# Patient Record
Sex: FEMALE | Race: WHITE | Employment: FULL TIME | URBAN - METROPOLITAN AREA
[De-identification: names, ages, dates, MRNs, and addresses within clinical notes are randomized per-mention and may not be internally consistent; named-entity substitution may affect disease eponyms.]

---

## 2020-12-30 ENCOUNTER — OFFICE VISIT (OUTPATIENT)
Dept: OBGYN CLINIC | Age: 30
End: 2020-12-30
Payer: COMMERCIAL

## 2020-12-30 VITALS
HEART RATE: 106 BPM | WEIGHT: 162 LBS | HEIGHT: 62 IN | BODY MASS INDEX: 29.81 KG/M2 | TEMPERATURE: 98.6 F | SYSTOLIC BLOOD PRESSURE: 114 MMHG | DIASTOLIC BLOOD PRESSURE: 72 MMHG

## 2020-12-30 PROBLEM — N80.9 ENDOMETRIOSIS: Status: ACTIVE | Noted: 2020-12-30

## 2020-12-30 PROBLEM — F32.A DEPRESSION: Status: ACTIVE | Noted: 2020-12-30

## 2020-12-30 PROBLEM — E03.8 OTHER SPECIFIED HYPOTHYROIDISM: Status: ACTIVE | Noted: 2020-12-30

## 2020-12-30 PROCEDURE — 99385 PREV VISIT NEW AGE 18-39: CPT | Performed by: NURSE PRACTITIONER

## 2020-12-30 RX ORDER — VITAMIN A ACETATE, .BETA.-CAROTENE, ASCORBIC ACID, CHOLECALCIFEROL, .ALPHA.-TOCOPHEROL ACETATE, DL-, THIAMINE MONONITRATE, RIBOFLAVIN, NIACINAMIDE, PYRIDOXINE HYDROCHLORIDE, FOLIC ACID, CYANOCOBALAMIN, CALCIUM CARBONATE, FERROUS FUMARATE, ZINC OXIDE, AND CUPRIC OXIDE 2000; 2000; 120; 400; 22; 1.84; 3; 20; 10; 1; 12; 200; 27; 25; 2 [IU]/1; [IU]/1; MG/1; [IU]/1; MG/1; MG/1; MG/1; MG/1; MG/1; MG/1; UG/1; MG/1; MG/1; MG/1; MG/1
1 TABLET ORAL DAILY
Qty: 30 TABLET | Refills: 5 | Status: SHIPPED | OUTPATIENT
Start: 2020-12-30 | End: 2021-07-13

## 2020-12-30 NOTE — PROGRESS NOTES
History and Physical  [address], [address] [facility] [address], [address] [facility] [address] [phone]   Fax [phone]  [address] [place] [address]  2020              35 y.o. Chief Complaint   Patient presents with   Adrian Martin Doctor    Annual Exam       Patient's last menstrual period was 2020. Primary Care Physician: Constantin Smiley    The patient was seen and examined. She  is here for her annual exam. States she has a hx of endometriosis. Hx laparoscopy and D&C in . Was on an [employer] clinical trial from 2017 to 10/2017 with relief. States this was the only time in the last several years without pelvic pain. TTC with  for past 8 years. Hx  declining to do a SA. States pelvic pain is worse over past 4 months. Minimal relief with tylenol and tylenol 3. States last u/s showed a 6 cm endometrioma on left ovary. Her bowels are regular. There are no voiding complaints. She denies any bloating. She denies vaginal discharge and was counseled on STD's and the need for barrier contraception.      HPI : [address] [place] [address] is a 35 y.o. female     Annual exam  Hx endometriosis   Pelvic pain   ________________________________________________________________________  OB History    Para Term  AB Living   0 0 0 0 0 0   SAB TAB Ectopic Molar Multiple Live Births   0 0 0 0 0 0     Past Medical History:   Diagnosis Date    Anxiety     ON RX    Hyperlipidemia     BOARDERLLINE NO MEDS    Kidney stones -2016    X 6-PASSED ALL ON OWN    URI (upper respiratory infection) 10/2016    WAS ON ATB, INHALER ORAL COUGH MED, RESOLVED NOW                                                                   Past Surgical History:   Procedure Laterality Date    ABDOMINAL EXPLORATION SURGERY  11/15/2016    D&C, chromotubation, fulgration    OVARY SURGERY Right 11/15/2016    biposy of cyst     Family History   Problem Relation Age of Onset  High Blood Pressure Mother     Diabetes Mother     Asthma Mother     Kidney Disease Father         KIDNEY STONES    Heart Failure Maternal Grandmother     Breast Cancer Paternal Grandmother     Other Paternal Grandmother         endometriosis    Asthma Brother      Social History     Socioeconomic History    Marital status:      Spouse name: Not on file    Number of children: Not on file    Years of education: Not on file    Highest education level: Not on file   Occupational History    Not on file   Social Needs    Financial resource strain: Not on file    Food insecurity     Worry: Not on file     Inability: Not on file    Transportation needs     Medical: Not on file     Non-medical: Not on file   Tobacco Use    Smoking status: Current Every Day Smoker     Packs/day: 0.50     Types: Cigarettes    Smokeless tobacco: Never Used   Substance and Sexual Activity    Alcohol use: Yes     Comment: LIQUOR-4 DRINKS A YEAR    Drug use: No    Sexual activity: Yes     Partners: Male   Lifestyle    Physical activity     Days per week: Not on file     Minutes per session: Not on file    Stress: Not on file   Relationships    Social connections     Talks on phone: Not on file     Gets together: Not on file     Attends [employer]ism service: Not on file     Active member of club or organization: Not on file     Attends meetings of clubs or organizations: Not on file     Relationship status: Not on file    Intimate partner violence     Fear of current or ex partner: Not on file     Emotionally abused: Not on file     Physically abused: Not on file     Forced sexual activity: Not on file   Other Topics Concern    Not on file   Social History Narrative    Not on file       MEDICATIONS:  Current Outpatient Medications   Medication Sig Dispense Refill    vitamin D (CHOLECALCIFEROL) 125 MCG (5000 UT) CAPS capsule Take 5,000 Units by mouth daily  progesterone (PROMETRIUM) 100 MG capsule Take 100 mg by mouth daily      Loratadine (CLARITIN PO) Take by mouth      clonazePAM (KLONOPIN) 0.5 MG tablet TK 1/2 TO 1 T PO BID  1    Erythromycin 2 % PADS AURORA AA BID                 USES ON FACE  1    MAGNESIUM PO Take 250 mg by mouth daily        No current facility-administered medications for this visit. ALLERGIES:  Allergies as of 12/30/2020 - Review Complete 12/30/2020   Allergen Reaction Noted    Ibuprofen Other (See Comments) 11/08/2016       Symptoms of decreased mood absent  Symptoms of anhedonia absent    **If either question is answered in a  positive fashion then complete the PHQ9 Scoring Evaluation and make the appropriate referral**      Immunization status: stated as current, but no records available. Gynecologic History:  Menarche: 16 yo  Menopause at [address] [place] yo     Patient's last menstrual period was 12/19/2020. Sexually Active: Yes    STD History: No     Permanent Sterilization: No   Reversible Birth Control: No        Hormone Replacement Exposure: No      Genetic Qualified Family History of Breast, Ovarian , Colon or Uterine Cancer: Yes paternal grandmother breast cancer age late 46s     If YES see scanned worksheet.     Preventative Health Testing:    Health Maintenance:  Health Maintenance Due   Topic Date Due    Hepatitis C screen  1991    Varicella vaccine (1 of 2 - 2-dose childhood series) 03/12/1992    Pneumococcal 0-64 years Vaccine (1 of 1 - PPSV23) 03/12/1997    HIV screen  03/12/2006    Hepatitis B vaccine (2 of 3 - 3-dose primary series) 04/20/2009    Hepatitis A vaccine (2 of 2 - 2-dose series) 09/23/2009    DTaP/Tdap/Td vaccine (2 - Td) 03/23/2019    Cervical cancer screen  10/19/2019    Flu vaccine (1) 09/01/2020       Date of Last Pap Smear: 10/19/2016 neg  Abnormal Pap Smear History: denies  Colposcopy History:   Date of Last Mammogram: NA  Date of Last Colonoscopy:   Date of Last Bone Density: ________________________________________________________________________        REVIEW OF SYSTEMS:    yes   A minimum of an eleven point review of systems was completed. Review Of Systems (11 point):  Constitutional: No fever, chills or malaise; No weight change or fatigue  Head and Eyes: No vision, Headache, Dizziness or trauma in last 12 months  ENT ROS: No hearing, Tinnitis, sinus or taste problems  Hematological and Lymphatic ROS:No Lymphoma, Von Willebrand's, Hemophillia or Bleeding History  Psych ROS: No Depression, Homicidal thoughts,suicidal thoughts, or anxiety  Breast ROS: No prior breast abnormalities or lumps  Respiratory ROS: No SOB, Pneumoniae,Cough, or Pulmonary Embolism History  Cardiovascular ROS: No Chest Pain with Exertion, Palpitations, Syncope, Edema, Arrhythmia  Gastrointestinal ROS: No Indigestion, Heartburn, Nausea, vomiting, Diarrhea, Constipation,or Bowel Changes; No Bloody Stools or melena  Genito-Urinary ROS: No Dysuria, Hematuria or Nocturia. No Urinary Incontinence or Vaginal Discharge  Musculoskeletal ROS: No Arthralgia, Arthritis,Gout,Osteoporosis or Rheumatism  Neurological ROS: No CVA, Migraines, Epilepsy, Seizure Hx, or Limb Weakness  Dermatological ROS: No Rash, Itching, Hives, Mole Changes or Cancer                                                                                                                                                                                                                                  PHYSICAL Exam:     Constitutional:  Vitals:    12/30/20 0833   BP: 114/72   Site: Left Upper Arm   Position: Sitting   Cuff Size: Medium Adult   Pulse: 106   Temp: 98.6 °F (37 °C)   Weight: 162 lb (73.5 kg)   Height: 5' 2\" (1.575 m)       Chaperone for Intimate Exam  ? Chaperone was offered and accepted as part of the rooming process. ? Chaperone: Kaye POTTS          General Appearance: This  is a well Developed, well Nourished, well groomed female. Her BMI was reviewed. Nutritional decision making was discussed. Skin:  There was a Normal Inspection of the skin without rashes or lesions. There were no rashes. (Papular, Maculopapular, Hives, Pustular, Macular)     There were no lesions (Ulcers, Erythema, Abn. Appearing Nevi)            Lymphatic:  No Lymph Nodes were Palpable in the neck , axilla or groin.  0 # Of Lymph Nodes; Location ; Character [Normal]  [Shotty] [Tender] [Enlarged]     Neck and EENT:  The neck was supple. There were no masses   The thyroid was not enlarged and had no masses. Perrla, EOMI B/L, TMI B/L No Abnormalities. Throat inspected-No exudates or Masses, Nares Patent No Masses        Respiratory: The lungs were auscultated and found to be clear. There were no rales, rhonchi or wheezes. There was a good respiratory effort. Cardiovascular: The heart was in a regular rate and rhythm. . No S3 or S4. There was no murmur appreciated. Location, grade, and radiation are not applicable. Extremities: The patients extremities were without calf tenderness, edema, or varicosities. There was full range of motion in all four extremities. Pulses in all four extremities were appreciated and are 2/4. Abdomen: The abdomen was soft and non-tender. There were good bowel sounds in all quadrants and there was no guarding, rebound or rigidity. On evaluation there was no evidence of hepatosplenomegaly and there was no costal vertebral mary tenderness bilaterally. No hernias were appreciated. Abdominal Scars: none    Psych: The patient had a normal Orientation to: Time, Place, Person, and Situation  There is no Mood / Affect changes    Breast:  (Chest)  normal appearance, no masses or tenderness  Self breast exams were reviewed in detail. Literature was given. Pelvic Exam:  Vulva and vagina appear normal. Bimanual exam reveals normal uterus and adnexa.     Rectal Exam:  exam declined by patient          Musculosk: Normal Gait and station was noted. Digits were evaluated without abnormal findings. Range of motion, stability and strength were evaluated and found to be appropriate for the patients age. ASSESSMENT:      35 y.o. Annual   Diagnosis Orders   1. Women's annual routine gynecological examination  PAP SMEAR   2. Pelvic pain  US PELVIS COMPLETE    US NON OB TRANSVAGINAL          Chief Complaint   Patient presents with    Established New Doctor    Annual Exam          Past Medical History:   Diagnosis Date    Anxiety 2012    ON RX    Hyperlipidemia     BOARDERLLINE NO MEDS    Kidney stones 2011-2016    X 6-PASSED ALL ON OWN    URI (upper respiratory infection) 10/2016    WAS ON ATB, INHALER ORAL COUGH MED, RESOLVED NOW         Patient Active Problem List    Diagnosis Date Noted    Acne vulgaris 09/28/2016    Menorrhagia with regular cycle 09/28/2016    Non morbid obesity due to excess calories 09/28/2016    Anxiety           Hereditary Breast, Ovarian, Colon and Uterine Cancer screening Done. Tobacco & Secondary smoke risks reviewed; instructed on cessation and avoidance      Counseling Completed:  Preventative Health Recommendations and Follow up. The patient was informed of the recommended preventative health recommendations. 1. Annuals every year; Cytology collections per prevailing guidelines. 2. Mammograms begin every year at 35 yo if no abnormalities are found and no family history. 3. Bone density studies every 2-3 years. Begin at 73 yo. If no fracture history or osteoporosis family history. (significant). 4. Colonoscopy begin at 40 yo. Repeat every ten years if negative and no family history. 5. Calcium of 2850-9531 mg/day in split dosing  6. Vitamin D 400-800 IU/day  7. All other preventative health recommendations will be managed by the patients Primary care physician. PLAN:  Return in about 4 weeks (around 1/27/2021) for follow up with physician. Pap smear collected  Referred to IVF [facility]  Pelvic u/s ordered   Records release signed   Rx PNV  Repeat Annual every 1 year  Cervical Cytology Evaluation begins at 24years old. If Negative Cytology, Follow-up screening per current guidelines. Mammograms every 1 year. If 35 yo and last mammogram was negative. Calcium and Vitamin D dosing reviewed. Colonoscopy screening reviewed as well as onset for bone density testing. Birth control and barrier recommendations discussed. STD counseling and prevention reviewed. Gardisil counseling completed for all patients 10-35 yo. Routine health maintenance per patients PCP. Orders Placed This Encounter   Procedures    US PELVIS COMPLETE     Standing Status:   Future     Standing Expiration Date:   3/30/2021     Order Specific Question:   Reason for exam:     Answer:   pelvic pain    US NON OB TRANSVAGINAL     Standing Status:   Future     Standing Expiration Date:   6/30/2021     Order Specific Question:   Reason for exam:     Answer:   pelvic pain    PAP SMEAR     Patient History:    Patient's last menstrual period was 12/19/2020. OBGYN Status: Having periods  Past Surgical History:  11/15/2016: ABDOMINAL EXPLORATION SURGERY      Comment:  D&C, chromotubation, fulgration  11/15/2016: OVARY SURGERY; Right      Comment:  biposy of cyst      Social History    Tobacco Use      Smoking status: Current Every Day Smoker        Packs/day: 0.50        Types: Cigarettes      Smokeless tobacco: Never Used       Standing Status:   Future     Standing Expiration Date:   12/30/2021     Order Specific Question:   Collection Type     Answer: Thin Prep     Order Specific Question:   Prior Abnormal Pap Test     Answer:   No     Order Specific Question:   Screening or Diagnostic     Answer:   Screening     Order Specific Question:   HPV Requested?      Answer:   Yes - If Abnormal Reflex HPV     Order Specific Question:   High Risk Patient     Answer:   N/A

## 2021-01-05 DIAGNOSIS — Z01.419 WOMEN'S ANNUAL ROUTINE GYNECOLOGICAL EXAMINATION: ICD-10-CM

## 2021-01-06 ENCOUNTER — TELEPHONE (OUTPATIENT)
Dept: OBGYN CLINIC | Age: 31
End: 2021-01-06

## 2021-01-27 ENCOUNTER — TELEPHONE (OUTPATIENT)
Dept: OBGYN CLINIC | Age: 31
End: 2021-01-27

## 2021-01-27 ENCOUNTER — HOSPITAL ENCOUNTER (OUTPATIENT)
Dept: ULTRASOUND IMAGING | Age: 31
Discharge: HOME | End: 2021-01-29
Payer: COMMERCIAL

## 2021-01-27 DIAGNOSIS — R10.2 PELVIC PAIN: ICD-10-CM

## 2021-01-27 PROCEDURE — 76856 US EXAM PELVIC COMPLETE: CPT

## 2021-01-27 NOTE — TELEPHONE ENCOUNTER
----- Message from BRUCE Orozco NP sent at 1/27/2021  8:39 AM EST -----  Please schedule follow up with physician  Patient is TTC  U/s showed fibroid  C/o pelvic pain

## 2021-01-28 NOTE — TELEPHONE ENCOUNTER
Patient notified of results and recommendations, encouraged patient to keep follow up with  to discuss ultrasound results, and discuss trying to conceive.

## 2021-02-05 ENCOUNTER — VIRTUAL VISIT (OUTPATIENT)
Dept: OBGYN CLINIC | Age: 31
End: 2021-02-05
Payer: COMMERCIAL

## 2021-02-05 DIAGNOSIS — E03.8 OTHER SPECIFIED HYPOTHYROIDISM: ICD-10-CM

## 2021-02-05 DIAGNOSIS — F32.A DEPRESSION, UNSPECIFIED DEPRESSION TYPE: ICD-10-CM

## 2021-02-05 DIAGNOSIS — R10.2 PELVIC PAIN: Primary | ICD-10-CM

## 2021-02-05 DIAGNOSIS — D25.2 SUBSEROUS LEIOMYOMA OF UTERUS: ICD-10-CM

## 2021-02-05 DIAGNOSIS — N80.9 ENDOMETRIOSIS: ICD-10-CM

## 2021-02-05 PROCEDURE — 99213 OFFICE O/P EST LOW 20 MIN: CPT | Performed by: OBSTETRICS & GYNECOLOGY

## 2021-02-05 RX ORDER — LEVOTHYROXINE, LIOTHYRONINE 38; 9 UG/1; UG/1
TABLET ORAL
COMMUNITY
Start: 2021-02-01 | End: 2021-07-13

## 2021-02-05 SDOH — HEALTH STABILITY: MENTAL HEALTH: HOW OFTEN DO YOU HAVE A DRINK CONTAINING ALCOHOL?: NOT ASKED

## 2021-02-05 NOTE — PROGRESS NOTES
The patient was counseled on the option of a virtual visit due to the Covid-19 pandemic per the guidelines set by [facility]. The patient accepted the option of the virtual visit. The patient wished to proceed with a virtual visit and declined a face to face visit.

## 2021-02-05 NOTE — PROGRESS NOTES
Rahel Pittman  2021    Rahel Pittman (:  1990) has requested an audio/video evaluation for the following concern(s):    1. Pelvic pain    2. Endometriosis    3. Hypothyroidism    4. Depression, unspecified depression type    5. Subserous leiomyoma of uterus          TELEHEALTH EVALUATION -- Audio/Visual (During XIO-29 public health emergency)      Denise Green is a 35 y.o. female       She was here to follow-up regarding her labs and diagnostics ordered at her last visit for the diagnosis of:    ICD-10-CM    1. Pelvic pain  R10.2    2. Endometriosis  N80.9    3. Hypothyroidism  E03.8    4. Depression, unspecified depression type  F32.9    5. Subserous leiomyoma of uterus  D25.2        Her bowels are regular and she is voiding without difficulty.        Past Medical History:   Diagnosis Date    Anxiety     ON RX    Hyperlipidemia     BOARDERLLINE NO MEDS    Kidney stones -2016    X 6-PASSED ALL ON OWN    URI (upper respiratory infection) 10/2016    WAS ON ATB, INHALER ORAL COUGH MED, RESOLVED NOW         Past Surgical History:   Procedure Laterality Date    ABDOMINAL EXPLORATION SURGERY  11/15/2016    D&C, chromotubation, fulgration    OVARY SURGERY Right 11/15/2016    biposy of cyst         Family History   Problem Relation Age of Onset    High Blood Pressure Mother     Diabetes Mother     Asthma Mother     Kidney Disease Father         KIDNEY STONES    Heart Failure Maternal Grandmother     Breast Cancer Paternal Grandmother     Other Paternal Grandmother         endometriosis    Asthma Brother          Social History     Tobacco Use    Smoking status: Current Every Day Smoker     Packs/day: 0.50     Types: Cigarettes    Smokeless tobacco: Never Used   Substance Use Topics    Alcohol use: Not Currently     Comment: LIQUOR-4 DRINKS A YEAR    Drug use: No         MEDICATIONS:  Current Outpatient Medications   Medication Sig Dispense Refill    vitamin D (CHOLECALCIFEROL) 125 MCG (5000 UT) CAPS capsule Take 5,000 Units by mouth daily      progesterone (PROMETRIUM) 100 MG capsule Take 100 mg by mouth daily      Prenatal Vit-Fe Fumarate-FA (PNV PRENATAL PLUS MULTIVITAMIN) 27-1 MG TABS Take 1 tablet by mouth daily 30 tablet 5    Loratadine (CLARITIN PO) Take by mouth      clonazePAM (KLONOPIN) 0.5 MG tablet TK 1/2 TO 1 T PO BID  1    Erythromycin 2 % PADS AURORA AA BID                 USES ON FACE  1    MAGNESIUM PO Take 250 mg by mouth daily       NP THYROID 60 MG tablet TAKE 1 TABLET BY MOUTH EVERY DAY       No current facility-administered medications for this visit. ALLERGIES:  Allergies as of 02/05/2021 - Review Complete 02/05/2021   Allergen Reaction Noted    Ibuprofen Other (See Comments) 11/08/2016         not currently breastfeeding. PE is limited due to the virtual visit    Abdomen: Soft non-tender; good bowel sounds. No guarding, rebound or rigidity. No CVA tenderness bilaterally reported when questioned. (Viewed Virtually)    Extremities: No calf tenderness, DTR 2/4, and No edema bilaterally as inspected by video and palpation by the patient (Viewed Virtually)    Pelvic: (Virtual Visit-Not Completed)    Diagnostics:  Us Pelvis Complete    Result Date: 1/27/2021  EXAMINATION: PELVIC ULTRASOUND 1/27/2021 TECHNIQUE: Transabdominal pelvic ultrasound was performed. COMPARISON: None HISTORY: ORDERING SYSTEM PROVIDED HISTORY: Pelvic pain TECHNOLOGIST PROVIDED HISTORY: This procedure can be scheduled via [employer]. Access your [employer] account by visiting [employer]. pelvic pain Acuity: Acute Type of Exam: Initial     FINDINGS: Measurements: Uterus:  8.2 x 3.5 by 5.1 cm Endometrial stripe:  6.3 mm Right Ovary:  2.9 x 2.3 x 3.3 cm Left Ovary:  3.2 x 2.4 x 3.1 cm Ultrasound Findings: Uterus: Uterus demonstrates normal myometrial echotexture.   Arising from the fundal region is a partially exophytic/possibly pedunculated heterogeneous hypoechoic mass measuring 2.4 x 2.3 x 3.2 cm most compatible with a fibroid. Endometrial stripe: Endometrial stripe is within normal limits. Right Ovary: Right ovary is within normal limits. Left Ovary:  Left ovary is within normal limits. Free Fluid: No evidence of free fluid. 3.2 cm partially exophytic fundal fibroid. Otherwise unremarkable pelvic ultrasound.          Lab Results:  Results for orders placed or performed during the hospital encounter of 19/48/99   Basic Metabolic Panel   Result Value Ref Range    Glucose 86 70 - 99 mg/dL    BUN 7 6 - 20 mg/dL    CREATININE 0.63 0.50 - 0.90 mg/dL    Bun/Cre Ratio 11 9 - 20    Calcium 8.9 8.6 - 10.4 mg/dL    Sodium 137 135 - 144 mmol/L    Potassium 4.0 3.7 - 5.3 mmol/L    Chloride 100 98 - 107 mmol/L    CO2 23 20 - 31 mmol/L    Anion Gap 14 9 - 17 mmol/L    GFR Non-African American >60 >60 mL/min    GFR African American >60 >60 mL/min    GFR Comment          GFR Staging NOT REPORTED    CBC Auto Differential   Result Value Ref Range    WBC 9.3 3.5 - 11.0 k/uL    RBC 4.73 4.0 - 5.2 m/uL    Hemoglobin 15.2 12.0 - 16.0 g/dL    Hematocrit 44.8 36 - 46 %    MCV 94.6 80 - 100 fL    MCH 32.2 26 - 34 pg    MCHC 34.0 31 - 37 g/dL    RDW 12.9 11.5 - 14.5 %    Platelets 914 095 - 814 k/uL    MPV 7.5 6.0 - 12.0 fL    Differential Type NOT REPORTED     Seg Neutrophils 60 36 - 66 %    Lymphocytes 31 24 - 44 %    Monocytes 4 1 - 7 %    Eosinophils % 5 (H) 1 - 4 %    Basophils 0 0 - 2 %    Segs Absolute 5.60 1.8 - 7.7 k/uL    Absolute Lymph # 2.80 1.0 - 4.8 k/uL    Absolute Mono # 0.40 0.2 - 0.8 k/uL    Absolute Eos # 0.50 (H) 0.0 - 0.4 k/uL    Basophils Absolute 0.00 0.0 - 0.2 k/uL    WBC Morphology NOT REPORTED     RBC Morphology NOT REPORTED     Platelet Estimate NOT REPORTED    Urinalysis   Result Value Ref Range    Color, UA YELLOW YEL    Turbidity UA CLEAR CLEAR    Glucose, Ur NEGATIVE NEG    Bilirubin Urine NEGATIVE NEG    Ketones, Urine NEGATIVE NEG    Specific [place], UA 1.010 1.005 - 1.030    Urine Hgb TRACE (A) NEG    pH, UA 6.5 5.0 - 8.0    Protein, UA NEGATIVE NEG    Urobilinogen, Urine Normal NORM    Nitrite, Urine NEGATIVE NEG    Leukocyte Esterase, Urine NEGATIVE NEG    Urinalysis Comments NOT REPORTED    Urine Drug Screen   Result Value Ref Range    Amphetamine Screen, Ur NEGATIVE NEG    Barbiturate Screen, Ur NEGATIVE NEG    Benzodiazepine Screen, Urine NEGATIVE NEG    Cocaine Metabolite, Urine NEGATIVE NEG    Methadone Screen, Urine NEGATIVE NEG    Opiates, Urine NEGATIVE NEG    Phencyclidine, Urine NEGATIVE NEG    Propoxyphene, Urine NOT REPORTED NEG    Cannabinoid Scrn, Ur NEGATIVE NEG    Oxycodone Screen, Ur NEGATIVE NEG    Methamphetamine, Urine NOT REPORTED NEG    Tricyclic Antidepressants, Urine NOT REPORTED NEG    MDMA, Urine NOT REPORTED NEG    Buprenorphine Urine NOT REPORTED NEG    Test Information       Assay provides medical screening only. The absence of expected drug(s) and/or   Microscopic Urinalysis   Result Value Ref Range    -          WBC, UA 0 TO 2 0 - 5 /HPF    RBC, UA 2 TO 5 0 - 2 /HPF    Casts UA NOT REPORTED /LPF    Crystals, UA NOT REPORTED NONE /HPF    Epithelial Cells UA 0 TO 2 /HPF    Renal Epithelial, UA NOT REPORTED 0 /HPF    Bacteria, UA NOT REPORTED NONE    Mucus, UA NOT REPORTED NONE    Trichomonas, UA NOT REPORTED NONE    Amorphous, UA NOT REPORTED NONE    Other Observations UA NOT REPORTED NREQ    Yeast, UA NOT REPORTED NONE   POCT urine pregnancy   Result Value Ref Range    Preg Test, Ur negative     QC OK? yes    POCT urine pregnancy   Result Value Ref Range    HCG, Pregnancy Urine (POC) NEGATIVE NEG           Assessment:   Diagnosis Orders   1. Pelvic pain     2. Endometriosis     3. Hypothyroidism     4. Depression, unspecified depression type     5.  Subserous leiomyoma of uterus       Chief Complaint   Patient presents with    Results         Patient Active Problem List    Diagnosis Date Noted    Endometriosis 12/30/2020     Priority: High    Menorrhagia with regular cycle 09/28/2016     Priority: High    Hypothyroidism 12/30/2020     Priority: Medium    Depression 12/30/2020     Priority: Medium    Subserous leiomyoma of uterus 02/05/2021    Acne vulgaris 09/28/2016    Non morbid obesity due to excess calories 09/28/2016    Anxiety        PLAN:  Return in about 2 weeks (around 2/19/2021) for Follow Up Current Problem-Virtual Visit. Smoking cessation reviewed  Male semen analysis-office to schedule  Consider L-scope with chromopertubation or HSG pending male studies. Nsaid therapy reviewed  Return to the office in 2-4 weeks. Counseled on preventative health maintenance follow-up. No orders of the defined types were placed in this encounter. Silva Cardenas is a 35 y.o. female female was evaluated by a Virtual Visit (video visit) encounter to address concerns as mentioned above. A caregiver was present when appropriate. Due to this being a TeleHealth encounter (During [facility]- [facility] emergency), evaluation of the following organ systems was limited: Vitals/Constitutional/EENT/Resp/CV/GI//MS/Neuro/Skin/Heme-Lymph-Imm. Pursuant to the emergency declaration under the [facility], [address] [place] [address] authority and the [employer] and [employer] Act, this Virtual Visit was conducted with patient's (and/or legal guardian's) consent, to reduce the patient's risk of exposure to COVID-19 and provide necessary medical care. The patient (and/or legal guardian) has also been advised to contact this office for worsening conditions or problems, and seek emergency medical treatment and/or call 911 if deemed necessary. Services were provided through a video synchronous discussion virtually to substitute for in-person clinic visit. Patient and provider were located at their individual homes.     Electronically signed by Kirill Hughes DO on 2/5/21 at 11:17 AM EST     An electronic signature was used to authenticate this note. The patient, Lorna Sanchez is a 35 y.o. female, was seen with a total time spent of 20 minutes for the visit on this date of service by the E/M provider. The time component had both face to face and non face to face time spent in determining the total time component. Counseling and education regarding her diagnosis listed below and her options regarding those diagnoses were also included in determining her time component. Diagnosis Orders   1. Pelvic pain     2. Endometriosis     3. Hypothyroidism     4. Depression, unspecified depression type     5. Subserous leiomyoma of uterus          The patient had her preventative health maintenance recommendations and follow-up reviewed with her at the completion of her visit.

## 2021-07-13 ENCOUNTER — OFFICE VISIT (OUTPATIENT)
Dept: OBGYN CLINIC | Age: 31
End: 2021-07-13

## 2021-07-13 VITALS
BODY MASS INDEX: 29.44 KG/M2 | WEIGHT: 160 LBS | HEIGHT: 62 IN | DIASTOLIC BLOOD PRESSURE: 84 MMHG | SYSTOLIC BLOOD PRESSURE: 125 MMHG | HEART RATE: 91 BPM

## 2021-07-13 DIAGNOSIS — F32.89 OTHER DEPRESSION: ICD-10-CM

## 2021-07-13 DIAGNOSIS — R87.610 ATYPICAL SQUAMOUS CELLS OF UNDETERMINED SIGNIFICANCE ON CYTOLOGIC SMEAR OF CERVIX (ASC-US): ICD-10-CM

## 2021-07-13 DIAGNOSIS — N80.9 ENDOMETRIOSIS: ICD-10-CM

## 2021-07-13 PROCEDURE — 99214 OFFICE O/P EST MOD 30 MIN: CPT | Performed by: OBSTETRICS & GYNECOLOGY

## 2021-07-13 RX ORDER — AMOXICILLIN AND CLAVULANATE POTASSIUM 500; 125 MG/1; MG/1
TABLET, FILM COATED ORAL
COMMUNITY
Start: 2021-07-06 | End: 2022-01-19

## 2021-07-13 RX ORDER — LEVOTHYROXINE, LIOTHYRONINE 57; 13.5 UG/1; UG/1
TABLET ORAL
COMMUNITY
Start: 2021-06-25

## 2021-07-13 NOTE — PROGRESS NOTES
MEDICATIONS:  Current Outpatient Medications   Medication Sig Dispense Refill    NP THYROID 90 MG tablet TAKE 1 TABLET BY MOUTH EVERY DAY      amoxicillin-clavulanate (AUGMENTIN) 500-125 MG per tablet TAKE 1 TABLET BY MOUTH EVERY TWELVE HOURS FOR 7 DAYS      vitamin D (CHOLECALCIFEROL) 125 MCG (5000 UT) CAPS capsule Take 5,000 Units by mouth daily      progesterone (PROMETRIUM) 100 MG capsule Take 100 mg by mouth daily      clonazePAM (KLONOPIN) 0.5 MG tablet TK 1/2 TO 1 T PO BID  1    MAGNESIUM PO Take 250 mg by mouth daily        No current facility-administered medications for this visit. ALLERGIES:  Allergies as of 07/13/2021 - Fully Reviewed 07/13/2021   Allergen Reaction Noted    Ibuprofen Other (See Comments) 11/08/2016         Blood pressure 125/84, pulse 91, height 5' 2\" (1.575 m), weight 160 lb (72.6 kg), last menstrual period 07/10/2021, not currently breastfeeding. Abdomen: Soft non-tender; good bowel sounds. No guarding, rebound or rigidity. No CVA tenderness bilaterally.     Extremities: No calf tenderness, DTR 2/4, and No edema bilaterally    Pelvic: Declined         Diagnostics:  EXAMINATION:   PELVIC ULTRASOUND       1/27/2021       TECHNIQUE:   Transabdominal pelvic ultrasound was performed.       COMPARISON:   None       HISTORY:   ORDERING SYSTEM PROVIDED HISTORY: Pelvic pain   TECHNOLOGIST PROVIDED HISTORY:   This procedure can be scheduled via [employer].  Access your [employer] account by   visiting [employer].   pelvic pain   Acuity: Acute   Type of Exam: Initial       FINDINGS:       Measurements:       Uterus:  8.2 x 3.5 by 5.1 cm       Endometrial stripe:  6.3 mm       Right Ovary:  2.9 x 2.3 x 3.3 cm       Left Ovary:  3.2 x 2.4 x 3.1 cm           Ultrasound Findings:       Uterus: Uterus demonstrates normal myometrial echotexture.  Arising from the   fundal region is a partially exophytic/possibly pedunculated heterogeneous   hypoechoic mass measuring 2.4 x 2.3 x 3.2 cm most compatible with a fibroid.       Endometrial stripe: Endometrial stripe is within normal limits.       Right Ovary: Right ovary is within normal limits.       Left Ovary:  Left ovary is within normal limits.       Free Fluid: No evidence of free fluid.           Impression   3.2 cm partially exophytic fundal fibroid.       Otherwise unremarkable pelvic ultrasound.            Lab Results:  Results for orders placed or performed during the hospital encounter of 27/88/25   Basic Metabolic Panel   Result Value Ref Range    Glucose 86 70 - 99 mg/dL    BUN 7 6 - 20 mg/dL    CREATININE 0.63 0.50 - 0.90 mg/dL    Bun/Cre Ratio 11 9 - 20    Calcium 8.9 8.6 - 10.4 mg/dL    Sodium 137 135 - 144 mmol/L    Potassium 4.0 3.7 - 5.3 mmol/L    Chloride 100 98 - 107 mmol/L    CO2 23 20 - 31 mmol/L    Anion Gap 14 9 - 17 mmol/L    GFR Non-African American >60 >60 mL/min    GFR African American >60 >60 mL/min    GFR Comment          GFR Staging NOT REPORTED    CBC Auto Differential   Result Value Ref Range    WBC 9.3 3.5 - 11.0 k/uL    RBC 4.73 4.0 - 5.2 m/uL    Hemoglobin 15.2 12.0 - 16.0 g/dL    Hematocrit 44.8 36 - 46 %    MCV 94.6 80 - 100 fL    MCH 32.2 26 - 34 pg    MCHC 34.0 31 - 37 g/dL    RDW 12.9 11.5 - 14.5 %    Platelets 472 579 - 004 k/uL    MPV 7.5 6.0 - 12.0 fL    Differential Type NOT REPORTED     Seg Neutrophils 60 36 - 66 %    Lymphocytes 31 24 - 44 %    Monocytes 4 1 - 7 %    Eosinophils % 5 (H) 1 - 4 %    Basophils 0 0 - 2 %    Segs Absolute 5.60 1.8 - 7.7 k/uL    Absolute Lymph # 2.80 1.0 - 4.8 k/uL    Absolute Mono # 0.40 0.2 - 0.8 k/uL    Absolute Eos # 0.50 (H) 0.0 - 0.4 k/uL    Basophils Absolute 0.00 0.0 - 0.2 k/uL    WBC Morphology NOT REPORTED     RBC Morphology NOT REPORTED     Platelet Estimate NOT REPORTED    Urinalysis   Result Value Ref Range    Color, UA YELLOW YEL    Turbidity UA CLEAR CLEAR    Glucose, Ur NEGATIVE NEG    Bilirubin Urine NEGATIVE NEG    Ketones, Urine NEGATIVE NEG Specific Gravity, UA 1.010 1.005 - 1.030    Urine Hgb TRACE (A) NEG    pH, UA 6.5 5.0 - 8.0    Protein, UA NEGATIVE NEG    Urobilinogen, Urine Normal NORM    Nitrite, Urine NEGATIVE NEG    Leukocyte Esterase, Urine NEGATIVE NEG    Urinalysis Comments NOT REPORTED    Urine Drug Screen   Result Value Ref Range    Amphetamine Screen, Ur NEGATIVE NEG    Barbiturate Screen, Ur NEGATIVE NEG    Benzodiazepine Screen, Urine NEGATIVE NEG    Cocaine Metabolite, Urine NEGATIVE NEG    Methadone Screen, Urine NEGATIVE NEG    Opiates, Urine NEGATIVE NEG    Phencyclidine, Urine NEGATIVE NEG    Propoxyphene, Urine NOT REPORTED NEG    Cannabinoid Scrn, Ur NEGATIVE NEG    Oxycodone Screen, Ur NEGATIVE NEG    Methamphetamine, Urine NOT REPORTED NEG    Tricyclic Antidepressants, Urine NOT REPORTED NEG    MDMA, Urine NOT REPORTED NEG    Buprenorphine Urine NOT REPORTED NEG    Test Information       Assay provides medical screening only. The absence of expected drug(s) and/or   Microscopic Urinalysis   Result Value Ref Range    -          WBC, UA 0 TO 2 0 - 5 /HPF    RBC, UA 2 TO 5 0 - 2 /HPF    Casts UA NOT REPORTED /LPF    Crystals, UA NOT REPORTED NONE /HPF    Epithelial Cells UA 0 TO 2 /HPF    Renal Epithelial, UA NOT REPORTED 0 /HPF    Bacteria, UA NOT REPORTED NONE    Mucus, UA NOT REPORTED NONE    Trichomonas, UA NOT REPORTED NONE    Amorphous, UA NOT REPORTED NONE    Other Observations UA NOT REPORTED NREQ    Yeast, UA NOT REPORTED NONE   POCT urine pregnancy   Result Value Ref Range    Preg Test, Ur negative     QC OK? yes    POCT urine pregnancy   Result Value Ref Range    HCG, Pregnancy Urine (POC) NEGATIVE NEG           Assessment:   Diagnosis Orders   1. Endometriosis Stage 3 by photos     2. Atypical squamous cells of undetermined significance on cytologic smear of cervix (ASC-US) + HRHPV Other     3.  Other depression       Chief Complaint   Patient presents with    Other     discuss fertility          Patient Active Problem List    Diagnosis Date Noted    Atypical squamous cells of undetermined significance on cytologic smear of cervix (ASC-US) + HRHPV Other 07/13/2021     Priority: High     Needs Colposcopy      Subserous leiomyoma of uterus-Pedunculated posteriorly 02/05/2021     Priority: High    Endometriosis Stage 3 by photos 12/30/2020     Priority: High     Pt was in test pool for [place] with Success      Menorrhagia with regular cycle 09/28/2016     Priority: High    Hypothyroidism 12/30/2020     Priority: Medium    Depression 12/30/2020     Priority: Medium     On Clonopin      Acne vulgaris 09/28/2016    Anxiety        PLAN:  Return in about 2 weeks (around 7/27/2021) for Colposcopy. Colposcopy 1-2 weeks  Lupron 3.75 mg IM /month up to 6 months with planned Operative L-Scope with FOI/KASSANDRA and Chromopertubation. Plan attempting pregnancy 6-8 weeks after last Lupron with two cycles  Return to the office in 2 weeks. Abstain  RTO 3 months to schedule surgery  Counseled on preventative health maintenance follow-up. No orders of the defined types were placed in this encounter. The patient, Brent Melgoza is a 28 y.o. female, was seen with a total time spent of 30 minutes for the visit on this date of service by the E/M provider. The time component had both face to face and non face to face time spent in determining the total time component. Counseling and education regarding her diagnosis listed below and her options regarding those diagnoses were also included in determining her time component. Diagnosis Orders   1. Endometriosis Stage 3 by photos     2. Atypical squamous cells of undetermined significance on cytologic smear of cervix (ASC-US) + HRHPV Other     3. Other depression          The patient had her preventative health maintenance recommendations and follow-up reviewed with her at the completion of her visit.

## 2021-09-30 ENCOUNTER — TELEPHONE (OUTPATIENT)
Dept: OBGYN CLINIC | Age: 31
End: 2021-09-30

## 2021-09-30 NOTE — TELEPHONE ENCOUNTER
Spoke with patient regarding Lupron injections. Patient was approved but due to the expense of the injection patient is unable to move forward. Patient has a high deductible and even with co pay card injection would be 600.00 a month. Patient would like to know if there is anything else that could be done?

## 2021-12-07 ENCOUNTER — TELEPHONE (OUTPATIENT)
Dept: OBGYN CLINIC | Age: 31
End: 2021-12-07

## 2021-12-07 NOTE — TELEPHONE ENCOUNTER
LM for pt to call office regarding the Lupron denial and other recommendations give per Dr Briana Shin.

## 2021-12-14 NOTE — TELEPHONE ENCOUNTER
----- Message from BRUCE Kyle CNP sent at 1/5/2021  3:29 PM EST -----  Pap smear ASCUS  Please see if HPV can be run?   AGE [phone]

## 2022-01-19 ENCOUNTER — OFFICE VISIT (OUTPATIENT)
Dept: OBGYN CLINIC | Age: 32
End: 2022-01-19
Payer: COMMERCIAL

## 2022-01-19 VITALS
BODY MASS INDEX: 29.63 KG/M2 | SYSTOLIC BLOOD PRESSURE: 120 MMHG | WEIGHT: 161 LBS | HEIGHT: 62 IN | DIASTOLIC BLOOD PRESSURE: 82 MMHG

## 2022-01-19 DIAGNOSIS — Z01.419 ENCOUNTER FOR GYNECOLOGICAL EXAMINATION WITHOUT ABNORMAL FINDING: Primary | ICD-10-CM

## 2022-01-19 DIAGNOSIS — Z11.51 SPECIAL SCREENING EXAMINATION FOR HUMAN PAPILLOMAVIRUS (HPV): ICD-10-CM

## 2022-01-19 PROCEDURE — 99395 PREV VISIT EST AGE 18-39: CPT | Performed by: NURSE PRACTITIONER

## 2022-01-19 NOTE — PROGRESS NOTES
History and Physical  [address], [address] [facility] [address], [address]. [phone]   Fax [phone]  [address] [place] [address]  2022              28 y.o. Chief Complaint   Patient presents with    Gynecologic Exam       Patient's last menstrual period was 2021 (approximate). Primary Care Physician: BRUCE Muir NP    The patient was seen and examined. She has no chief complaint today and is here for her annual exam.  Her bowels are regular. There are no voiding complaints. She denies any bloating. She denies vaginal discharge and was counseled on STD's and the need for barrier contraception.      HPI : [address] [place] [address] Jose is a 28 y.o. female [place]    Annual exam  NO chief complaint    no Bloating  no Early Satiety  no Unexplained weight change of more than 15 lbs  no  PMB  no  PCB  ________________________________________________________________________  OB History    Para Term  AB Living   0 0 0 0 0 0   SAB IAB Ectopic Molar Multiple Live Births   0 0 0 0 0 0     Past Medical History:   Diagnosis Date    Anxiety     ON RX    Hyperlipidemia     BOARDERLLINE NO MEDS    Kidney stones -2016    X 6-PASSED ALL ON OWN    URI (upper respiratory infection) 10/2016    WAS ON ATB, INHALER ORAL COUGH MED, RESOLVED NOW                                                                   Past Surgical History:   Procedure Laterality Date    ABDOMINAL EXPLORATION SURGERY  11/15/2016    D&C, chromotubation, fulgration    OVARY SURGERY Right 11/15/2016    biposy of cyst     Family History   Problem Relation Age of Onset    High Blood Pressure Mother     Diabetes Mother     Asthma Mother     Kidney Disease Father         KIDNEY STONES    Heart Failure Maternal Grandmother     Breast Cancer Paternal Grandmother     Other Paternal Grandmother         endometriosis    Asthma Brother      Social History     Socioeconomic History    Marital status:      Spouse name: Not on file    Number of children: Not on file    Years of education: Not on file    Highest education level: Not on file   Occupational History    Not on file   Tobacco Use    Smoking status: Current Every Day Smoker     Packs/day: 0.50     Types: Cigarettes    Smokeless tobacco: Never Used   Substance and Sexual Activity    Alcohol use: Not Currently     Comment: RMRZFL-7 DRINKS A YEAR    Drug use: No    Sexual activity: Yes     Partners: Male   Other Topics Concern    Not on file   Social History Narrative    Not on file     Social Determinants of Health     Financial Resource Strain:     Difficulty of Paying Living Expenses: Not on file   Food Insecurity:     Worried About Running Out of Food in the Last Year: Not on file    Delfino of Food in the Last Year: Not on file   Transportation Needs:     Lack of Transportation (Medical): Not on file    Lack of Transportation (Non-Medical):  Not on file   Physical Activity:     Days of Exercise per Week: Not on file    Minutes of Exercise per Session: Not on file   Stress:     Feeling of Stress : Not on file   Social Connections:     Frequency of Communication with Friends and Family: Not on file    Frequency of Social Gatherings with Friends and Family: Not on file    Attends [employer] Services: Not on file    Active Member of [address] [place] [address] [employer] or Organizations: Not on file    Attends Club or Organization Meetings: Not on file    Marital Status: Not on file   Intimate Partner Violence:     Fear of Current or Ex-Partner: Not on file    Emotionally Abused: Not on file    Physically Abused: Not on file    Sexually Abused: Not on file   Housing Stability:     Unable to Pay for Housing in the Last Year: Not on file    Number of [place] in the Last Year: Not on file    Unstable Housing in the Last Year: Not on file       MEDICATIONS:  Current Outpatient Medications   Medication Sig Dispense Refill    NP THYROID 90 MG tablet TAKE 1 TABLET BY MOUTH EVERY DAY      vitamin D (CHOLECALCIFEROL) 125 MCG (5000 UT) CAPS capsule Take 5,000 Units by mouth daily      clonazePAM (KLONOPIN) 0.5 MG tablet TK 1/2 TO 1 T PO BID  1    MAGNESIUM PO Take 250 mg by mouth daily        No current facility-administered medications for this visit. ALLERGIES:  Allergies as of 01/19/2022 - Fully Reviewed 01/19/2022   Allergen Reaction Noted    Ibuprofen Other (See Comments) 11/08/2016       Symptoms of decreased mood absent  Symptoms of anhedonia absent    **If either question is answered in a  positive fashion then complete the PHQ9 Scoring Evaluation and make the appropriate referral**      Immunization status: stated as current, but no records available. Gynecologic History:  Menarche: 16 yo  Menopause at [age] yo     Patient's last menstrual period was 12/28/2021 (approximate). Sexually Active: Yes    STD History: No     Permanent Sterilization: No   Reversible Birth Control: No        Hormone Replacement Exposure: No      Genetic Qualified Family History of Breast, Ovarian , Colon or Uterine Cancer: No     If YES see scanned worksheet.     Preventative Health Testing:    Health Maintenance:  Health Maintenance Due   Topic Date Due    Hepatitis C screen  Never done    Varicella vaccine (1 of 2 - 2-dose childhood series) Never done    COVID-19 Vaccine (1) Never done    Pneumococcal 0-64 years Vaccine (1 of 2 - PPSV23) Never done    Depression Monitoring  Never done    HIV screen  Never done    Hepatitis B vaccine (2 of 3 - 3-dose primary series) 04/20/2009    Hepatitis A vaccine (2 of 2 - 2-dose series) 09/23/2009    TSH testing  09/28/2017    DTaP/Tdap/Td vaccine (2 - Td or Tdap) 03/23/2019    Flu vaccine (1) 09/01/2021       Date of Last Pap Smear: 12/1/2020 ASCUS  Abnormal Pap Smear History: ASCUS  Colposcopy History:   Date of Last Mammogram: NA  Date of Last Colonoscopy:   Date of Last Bone Density:      ________________________________________________________________________        REVIEW OF SYSTEMS:    yes   A minimum of an eleven point review of systems was completed. Review Of Systems (11 point):  Constitutional: No fever, chills or malaise; No weight change or fatigue  Head and Eyes: No vision changes, Headache, Dizziness or trauma in last 12 months  ENT ROS: No hearing, Tinnitis, sinus or taste problems  Hematological and Lymphatic ROS:No Lymphoma, Von Willebrand's, Hemophillia or Bleeding History  Psych ROS: No Depression, Homicidal thoughts,suicidal thoughts, or anxiety  Breast ROS: No breast abnormalities or lumps  Respiratory ROS: No SOB, Pneumoniae,Cough, or Pulmonary Embolism   Cardiovascular ROS: No Chest Pain with Exertion, Palpitations, Syncope, Edema, Arrhythmia  Gastrointestinal ROS: No Indigestion, Heartburn, Nausea, vomiting, Diarrhea, Constipation,or Bowel Changes; No Bloody Stools or melena  Genito-Urinary ROS: No Dysuria, Hematuria or Nocturia. No Urinary Incontinence or Vaginal Discharge  Musculoskeletal ROS: No Arthralgia, Arthritis,Gout,Osteoporosis or Rheumatism  Neurological ROS: No CVA, Migraines, Epilepsy, Seizure Hx, or Limb Weakness  Dermatological ROS: No Rash, Itching, Hives, Mole Changes or Cancer                                                                                                                                                                                                                                  PHYSICAL Exam:     Constitutional:  Vitals:    01/19/22 1546   BP: 120/82   Site: Left Upper Arm   Position: Sitting   Cuff Size: Medium Adult   Weight: 161 lb (73 kg)   Height: 5' 2\" (1.575 m)       Chaperone for Intimate Exam   Chaperone was offered and accepted as part of the rooming process.  Chaperone: Zabrina POTTS          General Appearance: This  is a well Developed, well Nourished, well groomed female. Her BMI was reviewed.  Nutritional decision making was discussed. Skin:  There was a Normal Inspection of the skin without rashes or lesions. There were no rashes. (Papular, Maculopapular, Hives, Pustular, Macular)     There were no lesions (Ulcers, Erythema, Abn. Appearing Nevi)            Lymphatic:  No Lymph Nodes were Palpable in the neck , axilla or groin.  0 # Of Lymph Nodes; Location ; Character [Normal]  [Shotty] [Tender] [Enlarged]     Neck and EENT:  The neck was supple. There were no masses   The thyroid was not enlarged and had no masses. Perrla, EOMI B/L, TMI B/L No Abnormalities. Throat inspected-No exudates or Masses, Nares Patent No Masses        Respiratory: The lungs were auscultated and found to be clear. There were no rales, rhonchi or wheezes. There was a good respiratory effort. Cardiovascular: The heart was in a regular rate and rhythm. . No S3 or S4. There was no murmur appreciated. Location, grade, and radiation are not applicable. Extremities: The patients extremities were without calf tenderness, edema, or varicosities. There was full range of motion in all four extremities. Pulses in all four extremities were appreciated and are 2/4. Abdomen: The abdomen was soft and non-tender. There were good bowel sounds in all quadrants and there was no guarding, rebound or rigidity. On evaluation there was no evidence of hepatosplenomegaly and there was no costal vertebral mary tenderness bilaterally. No hernias were appreciated. Abdominal Scars: none    Psych: The patient had a normal Orientation to: Time, Place, Person, and Situation  There is no Mood / Affect changes    Breast:  (Chest)  normal appearance, no masses or tenderness  Self breast exams were reviewed in detail. Literature was given.     Pelvic Exam:  External genitalia: normal general appearance  Urinary system: urethral meatus normal  Vaginal: normal mucosa without prolapse or lesions  Cervix: normal appearance  Adnexa: normal bimanual exam  Uterus: normal single, nontender      Rectal Exam:  exam declined by patient          Musculosk:  Normal Gait and station was noted. Digits were evaluated without abnormal findings. Range of motion, stability and strength were evaluated and found to be appropriate for the patients age. ASSESSMENT:      28 y.o. Annual   Diagnosis Orders   1. Encounter for gynecological examination without abnormal finding  PAP SMEAR   2. Special screening examination for human papillomavirus (HPV)  PAP SMEAR          Chief Complaint   Patient presents with    Gynecologic Exam          Past Medical History:   Diagnosis Date    Anxiety 2012    ON RX    Hyperlipidemia     BOARDERLLINE NO MEDS    Kidney stones 2011-2016    X 6-PASSED ALL ON OWN    URI (upper respiratory infection) 10/2016    WAS ON ATB, INHALER ORAL COUGH MED, RESOLVED NOW         Patient Active Problem List    Diagnosis Date Noted    Atypical squamous cells of undetermined significance on cytologic smear of cervix (ASC-US) + HRHPV Other 07/13/2021     Priority: High     Needs Colposcopy      Subserous leiomyoma of uterus-Pedunculated posteriorly 02/05/2021    Endometriosis Stage 3 by photos 12/30/2020     Pt was in test pool for Orilissa with Success      Hypothyroidism 12/30/2020    Depression 12/30/2020     On Clonopin      Acne vulgaris 09/28/2016    Menorrhagia with regular cycle 09/28/2016    Anxiety           Hereditary Breast, Ovarian, Colon and Uterine Cancer screening Done. Tobacco & Secondary smoke risks reviewed; instructed on cessation and avoidance      Counseling Completed:  Preventative Health Recommendations and Follow up. The patient was informed of the recommended preventative health recommendations. 1. Annuals every year; Cytology collections per prevailing guidelines. 2. Mammograms begin every year at 37 yo if no abnormalities are found and no family history. 3. Bone density studies every 2-3 years.  Begin at 73 yo. If no fracture history or osteoporosis family history. (significant). 4. Colonoscopy begin at 38 yo. Repeat every ten years if negative and no family history. 5. Calcium of 3151-3809 mg/day in split dosing  6. Vitamin D 400-800 IU/day  7. All other preventative health recommendations will be managed by the patients Primary care physician. PLAN:  Return in about 1 year (around 2023) for annual.   Pap smear collected  Repeat Annual every 1 year  Cervical Cytology Evaluation begins at 24years old. If Negative Cytology, Follow-up screening per current guidelines. Mammograms every 1 year. If 37 yo and last mammogram was negative. Calcium and Vitamin D dosing reviewed. Colonoscopy screening reviewed as well as onset for bone density testing. Birth control and barrier recommendations discussed. STD counseling and prevention reviewed. Gardisil counseling completed for all patients 10-37 yo. Routine health maintenance per patients PCP. Orders Placed This Encounter   Procedures    PAP SMEAR     Patient History:    No LMP recorded. OBGYN Status: Having periods  Past Surgical History:  11/15/2016: ABDOMINAL EXPLORATION SURGERY      Comment:  D&C, chromotubation, fulgration  11/15/2016: OVARY SURGERY; Right      Comment:  biposy of cyst      Social History    Tobacco Use      Smoking status: Current Every Day Smoker        Packs/day: 0.50        Types: Cigarettes      Smokeless tobacco: Never Used       Standing Status:   Future     Standing Expiration Date:   2023     Order Specific Question:   Collection Type     Answer: Thin Prep     Order Specific Question:   Prior Abnormal Pap Test     Answer:   No     Order Specific Question:   Screening or Diagnostic     Answer:   Screening     Order Specific Question:   HPV Requested?      Answer:   Yes     Order Specific Question:   High Risk Patient     Answer:   N/A           The patient, Elsa Apgar is a 28 y.o. female, was seen with a total time spent of 30 minutes for the visit on this date of service by the E/M provider. The time component had both face to face and non face to face time spent in determining the total time component. Counseling and education regarding her diagnosis listed below and her options regarding those diagnoses were also included in determining her time component. Diagnosis Orders   1. Encounter for gynecological examination without abnormal finding  PAP SMEAR   2. Special screening examination for human papillomavirus (HPV)  PAP SMEAR        The patient had her preventative health maintenance recommendations and follow-up reviewed with her at the completion of her visit.

## 2022-01-24 ENCOUNTER — TELEPHONE (OUTPATIENT)
Dept: OBGYN CLINIC | Age: 32
End: 2022-01-24

## 2022-01-24 DIAGNOSIS — Z11.51 SPECIAL SCREENING EXAMINATION FOR HUMAN PAPILLOMAVIRUS (HPV): ICD-10-CM

## 2022-01-24 DIAGNOSIS — Z01.419 ENCOUNTER FOR GYNECOLOGICAL EXAMINATION WITHOUT ABNORMAL FINDING: ICD-10-CM

## 2022-01-24 NOTE — TELEPHONE ENCOUNTER
Per Josh Jha NP, pt notified of pap results of ASCUS and HPV+. Encouraged pt to keep appt for colposcopy on 2/17/22. Pt verbalized understanding.

## 2022-01-24 NOTE — TELEPHONE ENCOUNTER
----- Message from BRUCE Thurston NP sent at 1/24/2022 12:09 PM EST -----  Pap smear ASCUS  HPV + (not differentiated with [facility])  Age 28 y.o.   Patient scheduled for colp 2/17/2022

## 2022-05-05 ENCOUNTER — PROCEDURE VISIT (OUTPATIENT)
Dept: OBGYN CLINIC | Age: 32
End: 2022-05-05
Payer: COMMERCIAL

## 2022-05-05 VITALS
BODY MASS INDEX: 30.55 KG/M2 | WEIGHT: 166 LBS | DIASTOLIC BLOOD PRESSURE: 70 MMHG | SYSTOLIC BLOOD PRESSURE: 112 MMHG | HEIGHT: 62 IN

## 2022-05-05 DIAGNOSIS — R10.2 PELVIC PAIN: ICD-10-CM

## 2022-05-05 DIAGNOSIS — N80.9 ENDOMETRIOSIS: ICD-10-CM

## 2022-05-05 DIAGNOSIS — R87.610 ATYPICAL SQUAMOUS CELLS OF UNDETERMINED SIGNIFICANCE ON CYTOLOGIC SMEAR OF CERVIX (ASC-US): Primary | ICD-10-CM

## 2022-05-05 DIAGNOSIS — D25.2 SUBSEROUS LEIOMYOMA OF UTERUS: ICD-10-CM

## 2022-05-05 LAB
CONTROL: NORMAL
PREGNANCY TEST URINE, POC: NEGATIVE

## 2022-05-05 PROCEDURE — 57456 ENDOCERV CURETTAGE W/SCOPE: CPT | Performed by: OBSTETRICS & GYNECOLOGY

## 2022-05-05 PROCEDURE — 81025 URINE PREGNANCY TEST: CPT | Performed by: OBSTETRICS & GYNECOLOGY

## 2022-05-05 RX ORDER — ACETAMINOPHEN AND CODEINE PHOSPHATE 300; 30 MG/1; MG/1
TABLET ORAL
COMMUNITY
Start: 2022-03-22

## 2022-05-05 NOTE — PROGRESS NOTES
[facility]    COLPOSCOPY PROCEDURE FORM    Chief Complaint:   Chief Complaint   Patient presents with    Procedure     colpo         5/5/2022  Rahel Pittman  Patient's last menstrual period was 04/26/2022.  33 y.o.  Pravinwesley Alexandrasheldon    Past Medical History:   Diagnosis Date    Anxiety 2012    ON RX    Hyperlipidemia     BOARDERLLINE NO MEDS    Kidney stones 2011-2016    X 6-PASSED ALL ON OWN    URI (upper respiratory infection) 10/2016    WAS ON ATB, INHALER ORAL COUGH MED, RESOLVED NOW         Past Surgical History:   Procedure Laterality Date    ABDOMINAL EXPLORATION SURGERY  11/15/2016    D&C, chromotubation, fulgration    OVARY SURGERY Right 11/15/2016    biposy of cyst       Family History   Problem Relation Age of Onset    High Blood Pressure Mother     Diabetes Mother     Asthma Mother     Kidney Disease Father         KIDNEY STONES    Heart Failure Maternal Grandmother     Breast Cancer Paternal Grandmother     Other Paternal Grandmother         endometriosis    Asthma Brother        Social History     Socioeconomic History    Marital status:      Spouse name: Not on file    Number of children: Not on file    Years of education: Not on file    Highest education level: Not on file   Occupational History    Not on file   Tobacco Use    Smoking status: Current Every Day Smoker     Packs/day: 0.50     Types: Cigarettes    Smokeless tobacco: Never Used   Substance and Sexual Activity    Alcohol use: Not Currently     Comment: LIQUOR-4 DRINKS A YEAR    Drug use: No    Sexual activity: Yes     Partners: Male   Other Topics Concern    Not on file   Social History Narrative    Not on file     Social Determinants of Health     Financial Resource Strain:     Difficulty of Paying Living Expenses: Not on file   Food Insecurity:     Worried About Running Out of Food in the Last Year: Not on file    Delfino of Food in the Last Year: Not on file   Transportation Needs:  Lack of Transportation (Medical): Not on file    Lack of Transportation (Non-Medical): Not on file   Physical Activity:     Days of Exercise per Week: Not on file    Minutes of Exercise per Session: Not on file   Stress:     Feeling of Stress : Not on file   Social Connections:     Frequency of Communication with Friends and Family: Not on file    Frequency of Social Gatherings with Friends and Family: Not on file    Attends [employer] Services: Not on file    Active Member of [address] [place] [address] [employer] or Organizations: Not on file    Attends Club or Organization Meetings: Not on file    Marital Status: Not on file   Intimate Partner Violence:     Fear of Current or Ex-Partner: Not on file    Emotionally Abused: Not on file    Physically Abused: Not on file    Sexually Abused: Not on file   Housing Stability:     Unable to Pay for Housing in the Last Year: Not on file    Number of [place] in the Last Year: Not on file    Unstable Housing in the Last Year: Not on file       Current Outpatient Medications on File Prior to Visit   Medication Sig Dispense Refill    acetaminophen-codeine (TYLENOL #3) 300-30 MG per tablet TAKE 1 TABLET BY MOUTH EVERY FOUR TO SIX HOURS      NP THYROID 90 MG tablet TAKE 1 TABLET BY MOUTH EVERY DAY      vitamin D (CHOLECALCIFEROL) 125 MCG (5000 UT) CAPS capsule Take 5,000 Units by mouth daily      clonazePAM (KLONOPIN) 0.5 MG tablet TK 1/2 TO 1 T PO BID  1    MAGNESIUM PO Take 250 mg by mouth daily        No current facility-administered medications on file prior to visit. Allergies as of 05/05/2022 - Fully Reviewed 05/05/2022   Allergen Reaction Noted    Ibuprofen Other (See Comments) 11/08/2016           INDICATIONS:   1. Atypical squamous cells of undetermined significance on cytologic smear of cervix (ASC-US) + HRHPV Other    2. Pelvic pain    3. Subserous leiomyoma of uterus    4.  Endometriosis Stage 3 by photos                   CG: negative         HPV:   other Birth Control Method: Barrier recs  Abnormal Cytology and Colposcopy History: YES      COLPOSCOPIC EXAMINATION:                Blood pressure 112/70, height 5' 2\" (1.575 m), weight 166 lb (75.3 kg), last menstrual period 04/26/2022, not currently breastfeeding. Chaperone for Intimate Exam   Chaperone was offered and accepted as part of the rooming process.  Chaperone: Kaye        A time out was called by the Chaperone listed above while ALL participants were quiet and attentive. The procedure to be completed was confirmed, with the appropriate laterality demarcated prior, if appropriate, as well as the patients name and a unique identifier, her date of birth. All questions were answered to her satisfaction. The consent was signed prior to the time out and all the risks were discussed in detail. Gross observations: negative  Satisfactory: No   Unsatisfactory: Yes                LANDMARKS and ATYPICAL FINDINGS:  TZ = transformation zone   SC = new squamocolumnar junction  NC = Nabothian cyst    ME = immature squamous metaplasia  PO = polyp   AV = atypical vessels  C = condyloma  L = leukoplakia  AW = acetowhite epithelium   P = punctation  MO = mosaicism   LS = decreased Lugols uptake  X = biopsy sites  CA = invasive carcinoma      FINDINGS: The colposcope was utilized on high and low magnification. A plain white light and green filter were  also implemented after 3% of acetic acid was applied to the cervix. There was no Aceto White Epithelium, Mosaic Changes, Punctation, or Irregular Vessels seen. ECC performed:  Yes    Lugols Iodine Applied:   No       IMPRESSIONS: Negative  Biopsy sites: ECC        Imaging:  EXAMINATION:   PELVIC ULTRASOUND       1/27/2021       TECHNIQUE:   Transabdominal pelvic ultrasound was performed.       COMPARISON:   None       HISTORY:   ORDERING SYSTEM PROVIDED HISTORY: Pelvic pain   TECHNOLOGIST PROVIDED HISTORY:   This procedure [facility] scheduled via [facility]  Access your [employer] account by   visiting [place].   pelvic pain   Acuity: Acute   Type of Exam: Initial       FINDINGS:       Measurements:       Uterus:  8.2 x 3.5 by 5.1 cm       Endometrial stripe:  6.3 mm       Right Ovary:  2.9 x 2.3 x 3.3 cm       Left Ovary:  3.2 x 2.4 x 3.1 cm           Ultrasound Findings:       Uterus: Uterus demonstrates normal myometrial echotexture.  Arising from the   fundal region is a partially exophytic/possibly pedunculated heterogeneous   hypoechoic mass measuring 2.4 x 2.3 x 3.2 cm most compatible with a fibroid.       Endometrial stripe: Endometrial stripe is within normal limits.       Right Ovary: Right ovary is within normal limits.       Left Ovary:  Left ovary is within normal limits.       Free Fluid: No evidence of free fluid.           Impression   3.2 cm partially exophytic fundal fibroid.       Otherwise unremarkable pelvic ultrasound.             Assessment:   Diagnosis Orders   1. Atypical squamous cells of undetermined significance on cytologic smear of cervix (ASC-US) + HRHPV Other  POCT urine pregnancy    COLPOSCOPY W/ECC    Specimen to Pathology Outpatient   2. Pelvic pain     3. Subserous leiomyoma of uterus     4.  Endometriosis Stage 3 by photos           Patient Active Problem List    Diagnosis Date Noted    Atypical squamous cells of undetermined significance on cytologic smear of cervix (ASC-US) + HRHPV Other 07/13/2021     Priority: High     Overview Note:     5/5/2022 Colposcopy with ECC completed  5/2023 PAP (if colposcopy in 2022 is negative)      Subserous leiomyoma of uterus-Pedunculated posteriorly 02/05/2021     Priority: High    Endometriosis Stage 3 by photos 12/30/2020     Priority: High     Overview Note:     Pt was in test pool for Orilissa with Success      Menorrhagia with regular cycle 09/28/2016     Priority: High    Hypothyroidism 12/30/2020     Priority: Medium    Depression 12/30/2020     Priority: Medium     Overview Note:     On Clonopin      Acne vulgaris 09/28/2016    Anxiety            PLAN:   1. The patient was given formal restriction guidelines. She is to RTO in 2 weeks. FOR PATIENTS WHO UNDERWENT A BIOPSY-They were instructed to report any increase in   vaginal bleeding, discharge, or any temperature more than  100.4   F. Strict pelvic rest precautions were reviewed with lifting restrictions. 2. If WNL Histopathology or Negative Colposcopic evaluation without Biopsies and/or ECC then Cytologic Collection will be determined by the prevailing ASCCP guideines    3. HPV Barrier Recommendations and STD counseling completed    4. Lupron 3.75 mg IM /month up to 6 months with planned Operative L-Scope with FOI/KASSANDRA and Chromopertubation. Plan attempting pregnancy 6-8 weeks after last Lupron with two cycles. Pt is having problems starting the Lupron due to Insurance. The  will be assisting her over the next 2 weeks to get it approved or discounted. The pt is in agreement with the prior plan of Lupron then L-scope FOI/KASSANDRA and chomopertubation but if she can not afford the Lupron she wishes to proceed with surgery and attempted conception after the surgery. I reviewed the risks of ectopic pregnancy and informed her she could forgo the surgery and she declined demanding the surgery prior to attempting pregnancy as her pelvic pain continues to worsen.       Zheng Kevin, DO

## 2022-05-10 DIAGNOSIS — R87.610 ATYPICAL SQUAMOUS CELLS OF UNDETERMINED SIGNIFICANCE ON CYTOLOGIC SMEAR OF CERVIX (ASC-US): ICD-10-CM

## 2022-06-07 ENCOUNTER — TELEPHONE (OUTPATIENT)
Dept: OBGYN CLINIC | Age: 32
End: 2022-06-07

## 2022-06-16 ENCOUNTER — OFFICE VISIT (OUTPATIENT)
Dept: OBGYN CLINIC | Age: 32
End: 2022-06-16
Payer: COMMERCIAL

## 2022-06-16 VITALS
BODY MASS INDEX: 30.36 KG/M2 | SYSTOLIC BLOOD PRESSURE: 124 MMHG | HEIGHT: 62 IN | WEIGHT: 165 LBS | DIASTOLIC BLOOD PRESSURE: 72 MMHG

## 2022-06-16 DIAGNOSIS — Z32.02 NEGATIVE PREGNANCY TEST: ICD-10-CM

## 2022-06-16 DIAGNOSIS — N80.9 ENDOMETRIOSIS: Primary | ICD-10-CM

## 2022-06-16 LAB
CONTROL: NORMAL
PREGNANCY TEST URINE, POC: NEGATIVE

## 2022-06-16 PROCEDURE — 81025 URINE PREGNANCY TEST: CPT | Performed by: NURSE PRACTITIONER

## 2022-06-16 PROCEDURE — 96372 THER/PROPH/DIAG INJ SC/IM: CPT | Performed by: NURSE PRACTITIONER

## 2022-06-16 NOTE — PROGRESS NOTES
Christopher Quevedo pt given Lupron Depot 3.75mg left glute SHANICE#[id number] exp 8/6/2022 ucg negative.

## 2022-07-14 ENCOUNTER — NURSE ONLY (OUTPATIENT)
Dept: OBGYN CLINIC | Age: 32
End: 2022-07-14
Payer: COMMERCIAL

## 2022-07-14 VITALS
DIASTOLIC BLOOD PRESSURE: 72 MMHG | BODY MASS INDEX: 30.18 KG/M2 | HEIGHT: 62 IN | SYSTOLIC BLOOD PRESSURE: 124 MMHG | WEIGHT: 164 LBS

## 2022-07-14 DIAGNOSIS — N80.9 ENDOMETRIOSIS: Primary | ICD-10-CM

## 2022-07-14 LAB
CONTROL: NORMAL
PREGNANCY TEST URINE, POC: NEGATIVE

## 2022-07-14 PROCEDURE — 96372 THER/PROPH/DIAG INJ SC/IM: CPT | Performed by: NURSE PRACTITIONER

## 2022-07-14 PROCEDURE — 81025 URINE PREGNANCY TEST: CPT | Performed by: NURSE PRACTITIONER

## 2022-07-14 NOTE — PROGRESS NOTES
Per Nichelle Ramos NP, lupron 3.75mg IM given to right dorso-gluteal; lot # [id number] exp 3/3/23. UPT negative. To return in 28 days for next injection. Pt tolerated well.

## 2022-08-11 ENCOUNTER — NURSE ONLY (OUTPATIENT)
Dept: OBGYN CLINIC | Age: 32
End: 2022-08-11
Payer: COMMERCIAL

## 2022-08-11 VITALS
DIASTOLIC BLOOD PRESSURE: 72 MMHG | BODY MASS INDEX: 30 KG/M2 | SYSTOLIC BLOOD PRESSURE: 116 MMHG | HEIGHT: 62 IN | WEIGHT: 163 LBS

## 2022-08-11 DIAGNOSIS — Z32.02 NEGATIVE PREGNANCY TEST: ICD-10-CM

## 2022-08-11 DIAGNOSIS — N80.9 ENDOMETRIOSIS: Primary | ICD-10-CM

## 2022-08-11 LAB
CONTROL: NORMAL
PREGNANCY TEST URINE, POC: NEGATIVE

## 2022-08-11 PROCEDURE — 96372 THER/PROPH/DIAG INJ SC/IM: CPT | Performed by: NURSE PRACTITIONER

## 2022-08-11 PROCEDURE — 81025 URINE PREGNANCY TEST: CPT | Performed by: NURSE PRACTITIONER

## 2022-08-15 ENCOUNTER — HOSPITAL ENCOUNTER (EMERGENCY)
Age: 32
Discharge: HOME | End: 2022-08-16
Attending: STUDENT IN AN ORGANIZED HEALTH CARE EDUCATION/TRAINING PROGRAM
Payer: COMMERCIAL

## 2022-08-15 ENCOUNTER — APPOINTMENT (OUTPATIENT)
Dept: GENERAL RADIOLOGY | Age: 32
End: 2022-08-15
Payer: COMMERCIAL

## 2022-08-15 ENCOUNTER — TELEPHONE (OUTPATIENT)
Dept: OBGYN CLINIC | Age: 32
End: 2022-08-15

## 2022-08-15 VITALS
DIASTOLIC BLOOD PRESSURE: 72 MMHG | OXYGEN SATURATION: 96 % | RESPIRATION RATE: 19 BRPM | HEART RATE: 87 BPM | HEIGHT: 62 IN | TEMPERATURE: 98.2 F | BODY MASS INDEX: 30.18 KG/M2 | WEIGHT: 164 LBS | SYSTOLIC BLOOD PRESSURE: 115 MMHG

## 2022-08-15 DIAGNOSIS — R07.9 CHEST PAIN, UNSPECIFIED TYPE: ICD-10-CM

## 2022-08-15 DIAGNOSIS — R20.2 PARESTHESIAS: Primary | ICD-10-CM

## 2022-08-15 LAB
ABSOLUTE EOS #: 0.4 K/UL (ref 0–0.4)
ABSOLUTE LYMPH #: 4 K/UL (ref 1–4.8)
ABSOLUTE MONO #: 0.4 K/UL (ref 0.1–1.3)
ANION GAP SERPL CALCULATED.3IONS-SCNC: 11 MMOL/L (ref 9–17)
BASOPHILS # BLD: 1 % (ref 0–2)
BASOPHILS ABSOLUTE: 0.1 K/UL (ref 0–0.2)
BUN BLDV-MCNC: 11 MG/DL (ref 6–20)
CALCIUM SERPL-MCNC: 9.2 MG/DL (ref 8.6–10.4)
CHLORIDE BLD-SCNC: 104 MMOL/L (ref 98–107)
CO2: 23 MMOL/L (ref 20–31)
CREAT SERPL-MCNC: 0.76 MG/DL (ref 0.5–0.9)
D-DIMER QUANTITATIVE: <0.27 MG/L FEU (ref 0–0.59)
EOSINOPHILS RELATIVE PERCENT: 4 % (ref 0–4)
GFR AFRICAN AMERICAN: >60 ML/MIN
GFR NON-AFRICAN AMERICAN: >60 ML/MIN
GFR SERPL CREATININE-BSD FRML MDRD: ABNORMAL ML/MIN/{1.73_M2}
GLUCOSE BLD-MCNC: 114 MG/DL (ref 70–99)
HCT VFR BLD CALC: 40.3 % (ref 36–46)
HEMOGLOBIN: 14 G/DL (ref 12–16)
LYMPHOCYTES # BLD: 39 % (ref 24–44)
MCH RBC QN AUTO: 32.8 PG (ref 26–34)
MCHC RBC AUTO-ENTMCNC: 34.8 G/DL (ref 31–37)
MCV RBC AUTO: 94.2 FL (ref 80–100)
MONOCYTES # BLD: 4 % (ref 1–7)
PDW BLD-RTO: 12.9 % (ref 11.5–14.9)
PLATELET # BLD: 312 K/UL (ref 150–450)
PMV BLD AUTO: 7.5 FL (ref 6–12)
POTASSIUM SERPL-SCNC: 3.7 MMOL/L (ref 3.7–5.3)
RBC # BLD: 4.28 M/UL (ref 4–5.2)
SEG NEUTROPHILS: 52 % (ref 36–66)
SEGMENTED NEUTROPHILS ABSOLUTE COUNT: 5.3 K/UL (ref 1.3–9.1)
SODIUM BLD-SCNC: 138 MMOL/L (ref 135–144)
TROPONIN, HIGH SENSITIVITY: <6 NG/L (ref 0–14)
WBC # BLD: 10.2 K/UL (ref 3.5–11)

## 2022-08-15 PROCEDURE — 93005 ELECTROCARDIOGRAM TRACING: CPT

## 2022-08-15 PROCEDURE — 6370000000 HC RX 637 (ALT 250 FOR IP)

## 2022-08-15 PROCEDURE — 85379 FIBRIN DEGRADATION QUANT: CPT

## 2022-08-15 PROCEDURE — 85025 COMPLETE CBC W/AUTO DIFF WBC: CPT

## 2022-08-15 PROCEDURE — 71045 X-RAY EXAM CHEST 1 VIEW: CPT

## 2022-08-15 PROCEDURE — 84484 ASSAY OF TROPONIN QUANT: CPT

## 2022-08-15 PROCEDURE — 36415 COLL VENOUS BLD VENIPUNCTURE: CPT

## 2022-08-15 PROCEDURE — 80048 BASIC METABOLIC PNL TOTAL CA: CPT

## 2022-08-15 PROCEDURE — 99285 EMERGENCY DEPT VISIT HI MDM: CPT

## 2022-08-15 RX ORDER — ASPIRIN 81 MG/1
324 TABLET, CHEWABLE ORAL ONCE
Status: COMPLETED | OUTPATIENT
Start: 2022-08-15 | End: 2022-08-15

## 2022-08-15 RX ADMIN — ASPIRIN 324 MG: 81 TABLET, CHEWABLE ORAL at 22:57

## 2022-08-15 NOTE — TELEPHONE ENCOUNTER
Pt calling in with c/o exhausted, numbness in legs/hands, slight disorientation; jaw/bilat neck feels swollen but it is not. States had third injection of lupron last week; but has never experienced these symptoms. Instructed pt to call PCP and go to ER if SOB. Pt verbalized understanding.

## 2022-08-16 LAB
EKG ATRIAL RATE: 87 BPM
EKG P AXIS: 61 DEGREES
EKG P-R INTERVAL: 160 MS
EKG Q-T INTERVAL: 402 MS
EKG QRS DURATION: 90 MS
EKG QTC CALCULATION (BAZETT): 483 MS
EKG R AXIS: 89 DEGREES
EKG T AXIS: 53 DEGREES
EKG VENTRICULAR RATE: 87 BPM
TROPONIN, HIGH SENSITIVITY: <6 NG/L (ref 0–14)

## 2022-08-16 PROCEDURE — 93010 ELECTROCARDIOGRAM REPORT: CPT | Performed by: INTERNAL MEDICINE

## 2022-08-16 ASSESSMENT — ENCOUNTER SYMPTOMS
WHEEZING: 0
NAUSEA: 0
BACK PAIN: 0
VOMITING: 0
SHORTNESS OF BREATH: 0
ABDOMINAL PAIN: 0
RHINORRHEA: 0
PHOTOPHOBIA: 0

## 2022-08-16 NOTE — ED PROVIDER NOTES
Vaping Use    Vaping Use: Never used   Substance and Sexual Activity    Alcohol use: Not Currently     Comment: LIQUOR-4 DRINKS A YEAR    Drug use: No    Sexual activity: Yes     Partners: Male   Other Topics Concern    Not on file   Social History Narrative    Not on file     Social Determinants of Health     Financial Resource Strain: Not on file   Food Insecurity: Not on file   Transportation Needs: Not on file   Physical Activity: Not on file   Stress: Not on file   Social Connections: Not on file   Intimate Partner Violence: Not on file   Housing Stability: Not on file       Family History   Problem Relation Age of Onset    High Blood Pressure Mother     Diabetes Mother     Asthma Mother     Kidney Disease Father         KIDNEY STONES    Heart Failure Maternal Grandmother     Breast Cancer Paternal Grandmother     Other Paternal Grandmother         endometriosis    Asthma Brother        Allergies:  Ibuprofen    Home Medications:  Prior to Admission medications    Medication Sig Start Date End Date Taking? Authorizing Provider   leuprolide (LUPRON) 11.25 MG injection Inject 11.25 mg into the muscle once   Yes Historical Provider, MD   acetaminophen-codeine (TYLENOL #3) 300-30 MG per tablet TAKE 1 TABLET BY MOUTH EVERY FOUR TO SIX HOURS 3/22/22   Historical Provider, MD   NP THYROID 90 MG tablet TAKE 1 TABLET BY MOUTH EVERY DAY 6/25/21   Historical Provider, MD   vitamin D (CHOLECALCIFEROL) 125 MCG (5000 UT) CAPS capsule Take 5,000 Units by mouth daily    Historical Provider, MD   clonazePAM (KLONOPIN) 0.5 MG tablet TK 1/2 TO 1 T PO BID 6/24/16   Historical Provider, MD   MAGNESIUM PO Take 250 mg by mouth daily     Historical Provider, MD       REVIEW OF SYSTEMS    (2-9 systems for level 4, 10 or more for level 5)      Review of Systems   Constitutional:  Negative for chills and fever. HENT:  Negative for congestion and rhinorrhea. Eyes:  Negative for photophobia and visual disturbance.    Respiratory: Negative for shortness of breath and wheezing. Cardiovascular:  Positive for chest pain. Negative for palpitations. Gastrointestinal:  Negative for abdominal pain, nausea and vomiting. Genitourinary:  Negative for dysuria and frequency. Musculoskeletal:  Negative for back pain and neck pain. Skin:  Negative for rash and wound. Neurological:  Positive for numbness. PHYSICAL EXAM   (up to 7 for level 4, 8 or more for level 5)      INITIAL VITALS:   /72   Pulse 87   Temp 98.2 °F (36.8 °C) (Oral)   Resp 19   Ht 5' 2\" (1.575 m)   Wt 164 lb (74.4 kg)   LMP 06/27/2022 (Approximate)   SpO2 96%   BMI 30.00 kg/m²     Physical Exam  Vitals and nursing note reviewed. Constitutional:       General: She is not in acute distress. HENT:      Head: Atraumatic. Right Ear: External ear normal.      Left Ear: External ear normal.      Nose: Nose normal.      Mouth/Throat:      Mouth: Mucous membranes are moist.      Pharynx: Oropharynx is clear. Eyes:      Conjunctiva/sclera: Conjunctivae normal.   Cardiovascular:      Rate and Rhythm: Normal rate and regular rhythm. Pulmonary:      Effort: Pulmonary effort is normal. No respiratory distress. Breath sounds: Normal breath sounds. Abdominal:      Palpations: Abdomen is soft. Tenderness: There is no abdominal tenderness. Musculoskeletal:         General: No swelling. Normal range of motion. Cervical back: Normal range of motion. Right lower leg: No edema. Left lower leg: No edema. Skin:     General: Skin is warm and dry. Capillary Refill: Capillary refill takes less than 2 seconds. Neurological:      General: No focal deficit present. Mental Status: She is alert and oriented to person, place, and time.        DIFFERENTIAL  DIAGNOSIS     PLAN (LABS / IMAGING / EKG):  Orders Placed This Encounter   Procedures    XR CHEST PORTABLE    D-Dimer, Quantitative    CBC with Auto Differential    Basic Metabolic Panel    Troponin    Troponin    EKG 12 Lead       MEDICATIONS ORDERED:  Orders Placed This Encounter   Medications    aspirin chewable tablet 324 mg       DDX: ACS arrhythmia, DVT, pneumonia, electrolyte abnormality    DIAGNOSTIC RESULTS / EMERGENCY DEPARTMENT COURSE / MDM   LAB RESULTS:  Results for orders placed or performed during the hospital encounter of 08/15/22   D-Dimer, Quantitative   Result Value Ref Range    D-Dimer, Quant <0.27 0.00 - 0.59 mg/L FEU   CBC with Auto Differential   Result Value Ref Range    WBC 10.2 3.5 - 11.0 k/uL    RBC 4.28 4.0 - 5.2 m/uL    Hemoglobin 14.0 12.0 - 16.0 g/dL    Hematocrit 40.3 36 - 46 %    MCV 94.2 80 - 100 fL    MCH 32.8 26 - 34 pg    MCHC 34.8 31 - 37 g/dL    RDW 12.9 11.5 - 14.9 %    Platelets 951 299 - 598 k/uL    MPV 7.5 6.0 - 12.0 fL    Seg Neutrophils 52 36 - 66 %    Lymphocytes 39 24 - 44 %    Monocytes 4 1 - 7 %    Eosinophils % 4 0 - 4 %    Basophils 1 0 - 2 %    Segs Absolute 5.30 1.3 - 9.1 k/uL    Absolute Lymph # 4.00 1.0 - 4.8 k/uL    Absolute Mono # 0.40 0.1 - 1.3 k/uL    Absolute Eos # 0.40 0.0 - 0.4 k/uL    Basophils Absolute 0.10 0.0 - 0.2 k/uL   Basic Metabolic Panel   Result Value Ref Range    Glucose 114 (H) 70 - 99 mg/dL    BUN 11 6 - 20 mg/dL    Creatinine 0.76 0.50 - 0.90 mg/dL    Calcium 9.2 8.6 - 10.4 mg/dL    Sodium 138 135 - 144 mmol/L    Potassium 3.7 3.7 - 5.3 mmol/L    Chloride 104 98 - 107 mmol/L    CO2 23 20 - 31 mmol/L    Anion Gap 11 9 - 17 mmol/L    GFR Non-African American >60 >60 mL/min    GFR African American >60 >60 mL/min    GFR Comment         Troponin   Result Value Ref Range    Troponin, High Sensitivity <6 0 - 14 ng/L   Troponin   Result Value Ref Range    Troponin, High Sensitivity <6 0 - 14 ng/L   EKG 12 Lead   Result Value Ref Range    Ventricular Rate 87 BPM    Atrial Rate 87 BPM    P-R Interval 160 ms    QRS Duration 90 ms    Q-T Interval 402 ms    QTc Calculation (Bazett) 483 ms    P Axis 61 degrees    R Axis 89 degrees T Axis 53 degrees       IMPRESSION: Chest pain, paresthesias    RADIOLOGY:  XR CHEST PORTABLE   Final Result   No acute findings in the chest.               EKG  EKG Interpretation    Interpreted by emergency department physician    Rhythm: normal sinus   Rate: normal  Axis: right  Ectopy: none  Conduction: normal  ST Segments: normal  T Waves: normal  Q Waves: none    Clinical Impression: non-specific EKG    Laine Richey MD     All EKG's are interpreted by the Emergency Department Physician who either signs or Co-signs this chart in the absence of a cardiologist.    EMERGENCY DEPARTMENT COURSE:  81-year-old female presented to ED with complaints of chest pain this morning that she woke up with that is now improved, numbness to left upper and lower extremities that is now improving as well and described as tingling. No shortness of breath. On exam, afebrile, nontachycardic, no swelling, pulses intact. Low risk for DVT so D-dimer obtained that was negative. Troponins less than 6 and less than 6. EKG without any ST or T wave changes. Instructed to follow-up with PCP and return for any chest pain, shortness of breath, numbness, weakness, tingling. ED Course as of 08/16/22 0152   Mon Aug 15, 2022   2257 Troponin, High Sensitivity: <6 [AR]   9925 WBC: 10.2 [AR]   2332 D-Dimer, Quant: <0.27 [AR]   6602 Second Trope in process. [AR]   Tue Aug 16, 2022   0021 Troponin, High Sensitivity: <6 [AR]   0024 Updated on results. Answered all patient pertinent questions. Instructed follow-up with PCP and return for any new or worrisome symptoms. Denies any chest pain. Notes that numbness is improved. Does note some tingling that remains. [AR]      ED Course User Index  [AR] Jonathan Camargo MD       No notes of [facility] Admission Criteria type on file. PROCEDURES:  None    CONSULTS:  None    CRITICAL CARE:  None        FINAL IMPRESSION      1. Paresthesias    2.  Chest pain, unspecified type          DISPOSITION / PLAN     DISPOSITION Decision To Discharge 08/16/2022 12:21:12 AM      PATIENT REFERRED TO:  BRUCE Resendiz NP  C[address] Landry  [place] [address] [facility]  [phone]    In 2 days      [facility] ED  [facility] [address] [phone]    As needed    DISCHARGE MEDICATIONS:  Discharge Medication List as of 8/16/2022 12:21 AM          Joanie Gottlieb MD  Emergency Medicine Resident    (Please note that portions of thisnote were completed with a voice recognition program.  Efforts were made to edit the dictations but occasionally words are mis-transcribed.)      Lorena Rincon MD  Resident  08/16/22 [id number]

## 2022-08-16 NOTE — DISCHARGE INSTRUCTIONS
Thank you for visiting [address] [place] [address] Emergency Department. You need to call BRUCE Sam NP to make an appointment as directed for follow up. Please return to emergency department for any new or worrisome symptoms including any return of chest pain, shortness of breath, numbness, weakness, tingling.

## 2022-08-16 NOTE — ED PROVIDER NOTES
EMERGENCY DEPARTMENT ENCOUNTER   ATTENDING ATTESTATION     Pt Name: Pancho Campos  MRN: [id number]  [place] 1990  Date of evaluation: 8/15/22       Pancho Campos is a 33 y.o. female who presents with Hypertension, Numbness, and Extremity Weakness    States she woke up with some chest pain. Subjective numbness left arm. Normal neuro exam.     Appears anxious. Plan is cardiac workup including d dimer. Likely discharge    MDM:     Negative d dimer and two negative trops. Young healthy normal  neuro exam doubt stroke    Discharge with pcp follow up and return precatuiosn    Vitals:   Vitals:    08/15/22 2127   BP: 139/85   Pulse: (!) 106   Resp: 18   Temp: 98.2 °F (36.8 °C)   TempSrc: Oral   SpO2: 96%   Weight: 164 lb (74.4 kg)   Height: 5' 2\" (1.575 m)         I personally saw and examined the patient. I have reviewed and agree with the resident's findings, including all diagnostic interpretations and treatment plan as written. I was present for the key portions of any procedures performed and the inclusive time noted for any critical care statement. The care is provided during an unprecedented national emergency due to the novel coronavirus, COVID 19.   Mallory Interiano MD  Attending Emergency Physician           Mallory Interiano MD  08/16/22 0022       Mallory Interiano MD  08/16/22 [id number]

## 2022-08-16 NOTE — ED TRIAGE NOTES
Ambulatory to ED with hypertension, numbness to left side, and weakness to left side. Reports BP readings 160s/90s at home, reports this morning had numbness to left arm and leg and weakness, reports weakness has resolved but not numbness initial onset approximately 0800. Upon arrival steady gait, alert and oriented, respirations even and unlabored, skin p/w/d. NIH 0. Reports Hx of anxiety as well.

## 2022-10-04 ENCOUNTER — OFFICE VISIT (OUTPATIENT)
Dept: OBGYN CLINIC | Age: 32
End: 2022-10-04
Payer: COMMERCIAL

## 2022-10-04 VITALS
BODY MASS INDEX: 30.18 KG/M2 | WEIGHT: 164 LBS | HEIGHT: 62 IN | DIASTOLIC BLOOD PRESSURE: 72 MMHG | SYSTOLIC BLOOD PRESSURE: 116 MMHG

## 2022-10-04 DIAGNOSIS — R87.610 ATYPICAL SQUAMOUS CELLS OF UNDETERMINED SIGNIFICANCE ON CYTOLOGIC SMEAR OF CERVIX (ASC-US): ICD-10-CM

## 2022-10-04 DIAGNOSIS — D25.2 SUBSEROUS LEIOMYOMA OF UTERUS: ICD-10-CM

## 2022-10-04 DIAGNOSIS — R10.2 PELVIC PAIN: ICD-10-CM

## 2022-10-04 DIAGNOSIS — N80.9 ENDOMETRIOSIS: Primary | ICD-10-CM

## 2022-10-04 PROCEDURE — 99213 OFFICE O/P EST LOW 20 MIN: CPT | Performed by: OBSTETRICS & GYNECOLOGY

## 2022-10-04 RX ORDER — HYDROXYZINE 50 MG/1
TABLET, FILM COATED ORAL
COMMUNITY
Start: 2022-08-12

## 2022-10-04 NOTE — PROGRESS NOTES
Octavio Layne  10/4/2022      Octavio Layne is a 33 y.o. female       The patient was seen today. She was here to follow-up regarding her labs and diagnostics ordered at her last visit for the diagnosis of:    ICD-10-CM    1. Endometriosis  N80.9       2. Atypical squamous cells of undetermined significance on cytologic smear of cervix (ASC-US) + HRHPV Other  R87.610       3. Pelvic pain  R10.2       4. Subserous leiomyoma of uterus  D25.2           Her bowels are regular and she is voiding without difficulty. The pt was to get Lupron tx then fu for operative laparoscopy. At this time the patient completed 3 rounds of Lupron 3.75 mg IM and states all her symptoms resolved. She is requesting to proceed with the Operative laparoscopy with FOI/KASSANDRA and chromopertubation. Procedure RBA discussed. Future fertility wanted.       Past Medical History:   Diagnosis Date    Anxiety     ON RX    Hyperlipidemia     BOARDERLLINE NO MEDS    Kidney stones -2016    X 6-PASSED ALL ON OWN    URI (upper respiratory infection) 10/2016    WAS ON ATB, INHALER ORAL COUGH MED, RESOLVED NOW         Past Surgical History:   Procedure Laterality Date    ABDOMINAL EXPLORATION SURGERY  11/15/2016    D&C, chromotubation, fulgration    OVARY SURGERY Right 11/15/2016    biposy of cyst         Family History   Problem Relation Age of Onset    High Blood Pressure Mother     Diabetes Mother     Asthma Mother     Kidney Disease Father         KIDNEY STONES    Heart Failure Maternal Grandmother     Breast Cancer Paternal Grandmother     Other Paternal Grandmother         endometriosis    Asthma Brother          Social History     Tobacco Use    Smoking status: Every Day     Packs/day: 0.50     Types: Cigarettes    Smokeless tobacco: Never   Vaping Use    Vaping Use: Never used   Substance Use Topics    Alcohol use: Not Currently     Comment: LIQUOR-4 DRINKS A YEAR    Drug use: No         MEDICATIONS:  Current Outpatient Medications   Medication Sig Dispense Refill    hydrOXYzine HCl (ATARAX) 50 MG tablet TAKE 1 TABLET BY MOUTH EVERY SIX HOURS AS NEEDED FOR ANXIETY      acetaminophen-codeine (TYLENOL #3) 300-30 MG per tablet TAKE 1 TABLET BY MOUTH EVERY FOUR TO SIX HOURS      NP THYROID 90 MG tablet TAKE 1 TABLET BY MOUTH EVERY DAY      vitamin D (CHOLECALCIFEROL) 125 MCG (5000 UT) CAPS capsule Take 5,000 Units by mouth daily      clonazePAM (KLONOPIN) 0.5 MG tablet TK 1/2 TO 1 T PO BID  1    MAGNESIUM PO Take 250 mg by mouth daily        No current facility-administered medications for this visit. ALLERGIES:  Allergies as of 10/04/2022 - Fully Reviewed 10/04/2022   Allergen Reaction Noted    Ibuprofen Other (See Comments) 11/08/2016         Blood pressure 116/72, height 5' 2\" (1.575 m), weight 164 lb (74.4 kg), last menstrual period 09/15/2022, not currently breastfeeding. Abdomen: Soft non-tender; good bowel sounds. No guarding, rebound or rigidity. No CVA tenderness bilaterally. Extremities: No calf tenderness, DTR 2/4, and No edema bilaterally    Pelvic: Declined         Diagnostics:  No results found.       Lab Results:  Results for orders placed or performed during the hospital encounter of 08/15/22   D-Dimer, Quantitative   Result Value Ref Range    D-Dimer, Quant <0.27 0.00 - 0.59 mg/L FEU   CBC with Auto Differential   Result Value Ref Range    WBC 10.2 3.5 - 11.0 k/uL    RBC 4.28 4.0 - 5.2 m/uL    Hemoglobin 14.0 12.0 - 16.0 g/dL    Hematocrit 40.3 36 - 46 %    MCV 94.2 80 - 100 fL    MCH 32.8 26 - 34 pg    MCHC 34.8 31 - 37 g/dL    RDW 12.9 11.5 - 14.9 %    Platelets 077 779 - 336 k/uL    MPV 7.5 6.0 - 12.0 fL    Seg Neutrophils 52 36 - 66 %    Lymphocytes 39 24 - 44 %    Monocytes 4 1 - 7 %    Eosinophils % 4 0 - 4 %    Basophils 1 0 - 2 %    Segs Absolute 5.30 1.3 - 9.1 k/uL    Absolute Lymph # 4.00 1.0 - 4.8 k/uL    Absolute Mono # 0.40 0.1 - 1.3 k/uL    Absolute Eos # 0.40 0.0 - 0.4 k/uL Basophils Absolute 0.10 0.0 - 0.2 k/uL   Basic Metabolic Panel   Result Value Ref Range    Glucose 114 (H) 70 - 99 mg/dL    BUN 11 6 - 20 mg/dL    Creatinine 0.76 0.50 - 0.90 mg/dL    Calcium 9.2 8.6 - 10.4 mg/dL    Sodium 138 135 - 144 mmol/L    Potassium 3.7 3.7 - 5.3 mmol/L    Chloride 104 98 - 107 mmol/L    CO2 23 20 - 31 mmol/L    Anion Gap 11 9 - 17 mmol/L    GFR Non-African American >60 >60 mL/min    GFR African American >60 >60 mL/min    GFR Comment         Troponin   Result Value Ref Range    Troponin, High Sensitivity <6 0 - 14 ng/L   Troponin   Result Value Ref Range    Troponin, High Sensitivity <6 0 - 14 ng/L   EKG 12 Lead   Result Value Ref Range    Ventricular Rate 87 BPM    Atrial Rate 87 BPM    P-R Interval 160 ms    QRS Duration 90 ms    Q-T Interval 402 ms    QTc Calculation (Bazett) 483 ms    P Axis 61 degrees    R Axis 89 degrees    T Axis 53 degrees             Assessment:   Diagnosis Orders   1. Endometriosis        2. Atypical squamous cells of undetermined significance on cytologic smear of cervix (ASC-US) + HRHPV Other        3. Pelvic pain        4.  Subserous leiomyoma of uterus          Chief Complaint   Patient presents with    Follow-up         Patient Active Problem List    Diagnosis Date Noted    Atypical squamous cells of undetermined significance on cytologic smear of cervix (ASC-US) + HRHPV Other 07/13/2021     Priority: High     5/5/2022 Colposcopy with ECC completed  5/2023 PAP (if colposcopy in 2022 is negative)      Subserous leiomyoma of uterus-Pedunculated posteriorly 02/05/2021     Priority: High    Endometriosis Stage 3 by photos 12/30/2020     Priority: High     Pt was in test pool for Orilissa with Success      Menorrhagia with regular cycle 09/28/2016     Priority: High    Hypothyroidism 12/30/2020     Priority: Medium    Depression 12/30/2020     Priority: Medium     On Clonopin      Acne vulgaris 09/28/2016    Anxiety        PLAN:  Return in about 4 weeks (around 11/1/2022) for Surgical Boarding. PLAN for prior note:  Lupron 3.75 mg IM /month up to 6 months with planned Operative L-Scope with FOI/KASSANDRA and Chromopertubation. Plan attempting pregnancy 6-8 weeks after last Lupron with two cycles. Pt is having problems starting the Lupron due to Insurance. The  will be assisting her over the next 2 weeks to get it approved or discounted. The pt is in agreement with the prior plan of Lupron then L-scope FOI/KASSANDRA and chomopertubation but if she can not afford the Lupron she wishes to proceed with surgery and attempted conception after the surgery. I reviewed the risks of ectopic pregnancy and informed her she could forgo the surgery and she declined demanding the surgery prior to attempting pregnancy as her pelvic pain continues to worsen. PALN:  Pt completed 3 months of lupron as will be scheduled for her Operative Laparoscopy FOI/KASSANDRA with chromopertubation  Return to the office in 2-4 weeks. Barrier recommendations and STD counseling was completed  Counseled on preventative health maintenance follow-up. No orders of the defined types were placed in this encounter. The patient, Lashay Concepcion is a 33 y.o. female, was seen with a total time spent of 20 minutes for the visit on this date of service by the E/M provider. The time component had both face to face and non face to face time spent in determining the total time component. Counseling and education regarding her diagnosis listed below and her options regarding those diagnoses were also included in determining her time component. Diagnosis Orders   1. Endometriosis        2. Atypical squamous cells of undetermined significance on cytologic smear of cervix (ASC-US) + HRHPV Other        3. Pelvic pain        4. Subserous leiomyoma of uterus             The patient had her preventative health maintenance recommendations and follow-up reviewed with her at the completion of her visit.

## 2022-11-01 ENCOUNTER — OFFICE VISIT (OUTPATIENT)
Dept: OBGYN CLINIC | Age: 32
End: 2022-11-01
Payer: COMMERCIAL

## 2022-11-01 ENCOUNTER — PREP FOR PROCEDURE (OUTPATIENT)
Dept: OBGYN CLINIC | Age: 32
End: 2022-11-01

## 2022-11-01 VITALS
WEIGHT: 164 LBS | HEIGHT: 62 IN | BODY MASS INDEX: 30.18 KG/M2 | SYSTOLIC BLOOD PRESSURE: 114 MMHG | DIASTOLIC BLOOD PRESSURE: 70 MMHG

## 2022-11-01 DIAGNOSIS — D25.2 SUBSEROUS LEIOMYOMA OF UTERUS: ICD-10-CM

## 2022-11-01 DIAGNOSIS — Z30.09 FAMILY PLANNING: ICD-10-CM

## 2022-11-01 DIAGNOSIS — Z01.818 PREOP TESTING: Primary | ICD-10-CM

## 2022-11-01 DIAGNOSIS — R10.2 PELVIC PAIN: Primary | ICD-10-CM

## 2022-11-01 DIAGNOSIS — E03.8 OTHER SPECIFIED HYPOTHYROIDISM: ICD-10-CM

## 2022-11-01 DIAGNOSIS — N80.9 ENDOMETRIOSIS: ICD-10-CM

## 2022-11-01 PROCEDURE — 99213 OFFICE O/P EST LOW 20 MIN: CPT | Performed by: OBSTETRICS & GYNECOLOGY

## 2022-11-01 RX ORDER — SODIUM CHLORIDE 9 MG/ML
INJECTION, SOLUTION INTRAVENOUS PRN
Status: CANCELLED | OUTPATIENT
Start: 2022-11-01

## 2022-11-01 RX ORDER — SODIUM CHLORIDE, SODIUM LACTATE, POTASSIUM CHLORIDE, CALCIUM CHLORIDE 600; 310; 30; 20 MG/100ML; MG/100ML; MG/100ML; MG/100ML
INJECTION, SOLUTION INTRAVENOUS CONTINUOUS
Status: CANCELLED | OUTPATIENT
Start: 2022-11-01

## 2022-11-01 RX ORDER — SODIUM CHLORIDE 0.9 % (FLUSH) 0.9 %
5-40 SYRINGE (ML) INJECTION EVERY 12 HOURS SCHEDULED
Status: CANCELLED | OUTPATIENT
Start: 2022-11-01

## 2022-11-01 RX ORDER — SODIUM CHLORIDE 0.9 % (FLUSH) 0.9 %
5-40 SYRINGE (ML) INJECTION PRN
Status: CANCELLED | OUTPATIENT
Start: 2022-11-01

## 2022-11-01 NOTE — PROGRESS NOTES
[id number] [facility] Gynecology  [facility] [address]; [address]  [place], [address] [place] [address]  [phone] mn [phone] Fax        [facility]  1990  Primary Care Physician: BRUCE Marks NP        [address]: [facility]      2022  MEDICAID CONSENT COMPLETED: No      HPI: [id number] Perez Garcia is a 33 y.o. female   she is being seen for the problems listed below and is planning surgical intervention. She was counseled on all conservative medical and surgical options as well as definitive procedures as well. 1. Pelvic pain    2. Endometriosis    3. Hypothyroidism    4. Family planning    5.  Subserous leiomyoma of uterus-Pedunculated posteriorly          Relevant Past History:   Patient Active Problem List    Diagnosis Date Noted    Atypical squamous cells of undetermined significance on cytologic smear of cervix (ASC-US) + HRHPV Other 2021     Priority: High     2022 Colposcopy with ECC completed  2023 PAP (if colposcopy in  is negative)      Subserous leiomyoma of uterus-Pedunculated posteriorly 2021     Priority: High    Endometriosis Stage 3 by photos 2020     Priority: High     Pt was in test pool for [place] with Success      Menorrhagia with regular cycle 2016     Priority: High    Hypothyroidism 2020     Priority: Medium    Depression 2020     Priority: Medium     On Clonopin      Acne vulgaris 2016    Anxiety          OB History    Para Term  AB Living   0 0 0 0 0 0   SAB IAB Ectopic Molar Multiple Live Births   0 0 0 0 0 0       Past Medical History:   Diagnosis Date    Anxiety     ON RX    Hyperlipidemia     BOARDERLLINE NO MEDS    Kidney stones -2016    X 6-PASSED ALL ON OWN    URI (upper respiratory infection) 10/2016    WAS ON ATB, INHALER ORAL COUGH MED, RESOLVED NOW       Past Surgical History:   Procedure Laterality Date    ABDOMINAL EXPLORATION SURGERY  11/15/2016    D&C, chromotubation, fulgration    OVARY SURGERY Right 11/15/2016    biposy of cyst       Social History     Socioeconomic History    Marital status:      Spouse name: Not on file    Number of children: Not on file    Years of education: Not on file    Highest education level: Not on file   Occupational History    Not on file   Tobacco Use    Smoking status: Every Day     Packs/day: 0.50     Types: Cigarettes    Smokeless tobacco: Never   Vaping Use    Vaping Use: Never used   Substance and Sexual Activity    Alcohol use: Not Currently     Comment: LIQUOR-4 DRINKS A YEAR    Drug use: No    Sexual activity: Yes     Partners: Male   Other Topics Concern    Not on file   Social History Narrative    Not on file     Social Determinants of Health     Financial Resource Strain: Not on file   Food Insecurity: Not on file   Transportation Needs: Not on file   Physical Activity: Not on file   Stress: Not on file   Social Connections: Not on file   Intimate Partner Violence: Not on file   Housing Stability: Not on file       Psychosocial History: stable    MEDICATIONS:  Current Outpatient Medications   Medication Sig Dispense Refill    hydrOXYzine HCl (ATARAX) 50 MG tablet TAKE 1 TABLET BY MOUTH EVERY SIX HOURS AS NEEDED FOR ANXIETY      acetaminophen-codeine (TYLENOL #3) 300-30 MG per tablet TAKE 1 TABLET BY MOUTH EVERY FOUR TO SIX HOURS      NP THYROID 90 MG tablet TAKE 1 TABLET BY MOUTH EVERY DAY      vitamin D (CHOLECALCIFEROL) 125 MCG (5000 UT) CAPS capsule Take 5,000 Units by mouth daily      clonazePAM (KLONOPIN) 0.5 MG tablet TK 1/2 TO 1 T PO BID  1    MAGNESIUM PO Take 250 mg by mouth daily        No current facility-administered medications for this visit. ALLERGIES:  Allergies as of 11/01/2022 - Fully Reviewed 11/01/2022   Allergen Reaction Noted    Ibuprofen Other (See Comments) 11/08/2016     GI UPSET      REVIEW OF SYSTEMS:      General appearance:Appears healthy. Alert; in no acute distress. Pleasant. HEENT: Unremarkable   CARDIOVASCULAR: Denies: chest pain, dyspnea on exertion, palpitations  RESPIRATORY: Denies: cough, shortness of breath, wheezing  GI: Denies: abdominal pain, flank pain, nausea, vomiting, diarrhea  : Denies: dysuria, frequency/urgency, hematuria, pelvic pain  MUSCULOSKELETAL: Denies: back pain, joint swelling, muscle pain  SKIN: Denies: rash, hives. HEMATOLOGIC: Denies Bleeding Disorder, Coagulopathy or Transfusion  NEUROLOGIC: Denies Migraines, Headaches, CVA, TIA  ENDOCRINE: + Hypothyroidism                PHYSICAL EXAM:  Blood pressure 114/70, height 5' 2\" (1.575 m), weight 164 lb (74.4 kg), not currently breastfeeding. General Appearance:  awake, alert, oriented, in no acute distress, well developed, well nourished, in no acute distress   Skin:  Skin color, texture, turgor normal. No rashes or lesions  Mouth/Throat:  Mucosa moist.  No lesions. Pharynx without erythema, edema or exudate. Lungs:  Normal expansion. Clear to auscultation. No rales, rhonchi, or wheezing. Heart:  Heart sounds are normal.  Regular rate and rhythm without murmur, gallop or rub. Breast:  Declined  Abdomen:  Soft, non-tender, normal bowel sounds. No bruits, organomegaly or masses. Extremities: Extremities warm to touch, pink, with no edema. Musculoskeletal:  negative  Peripheral Pulses:  Normal  Neurologic:  Gait normal. Reflexes normal and symmetric. Sensation grossly intact. Female Urogen:  Declined  Female Rectal: Declined        Diagnostics:  No results found.     Lab Results:  Results for orders placed or performed during the hospital encounter of 08/15/22   D-Dimer, Quantitative   Result Value Ref Range    D-Dimer, Quant <0.27 0.00 - 0.59 mg/L FEU   CBC with Auto Differential   Result Value Ref Range    WBC 10.2 3.5 - 11.0 k/uL    RBC 4.28 4.0 - 5.2 m/uL    Hemoglobin 14.0 12.0 - 16.0 g/dL    Hematocrit 40.3 36 - 46 %    MCV 94.2 80 - 100 fL    MCH 32.8 26 - 34 pg    MCHC 34.8 31 - 37 g/dL    RDW 12.9 11.5 - 14.9 %    Platelets 886 575 - 638 k/uL    MPV 7.5 6.0 - 12.0 fL    Seg Neutrophils 52 36 - 66 %    Lymphocytes 39 24 - 44 %    Monocytes 4 1 - 7 %    Eosinophils % 4 0 - 4 %    Basophils 1 0 - 2 %    Segs Absolute 5.30 1.3 - 9.1 k/uL    Absolute Lymph # 4.00 1.0 - 4.8 k/uL    Absolute Mono # 0.40 0.1 - 1.3 k/uL    Absolute Eos # 0.40 0.0 - 0.4 k/uL    Basophils Absolute 0.10 0.0 - 0.2 k/uL   Basic Metabolic Panel   Result Value Ref Range    Glucose 114 (H) 70 - 99 mg/dL    BUN 11 6 - 20 mg/dL    Creatinine 0.76 0.50 - 0.90 mg/dL    Calcium 9.2 8.6 - 10.4 mg/dL    Sodium 138 135 - 144 mmol/L    Potassium 3.7 3.7 - 5.3 mmol/L    Chloride 104 98 - 107 mmol/L    CO2 23 20 - 31 mmol/L    Anion Gap 11 9 - 17 mmol/L    GFR Non-African American >60 >60 mL/min    GFR African American >60 >60 mL/min    GFR Comment         Troponin   Result Value Ref Range    Troponin, High Sensitivity <6 0 - 14 ng/L   Troponin   Result Value Ref Range    Troponin, High Sensitivity <6 0 - 14 ng/L   EKG 12 Lead   Result Value Ref Range    Ventricular Rate 87 BPM    Atrial Rate 87 BPM    P-R Interval 160 ms    QRS Duration 90 ms    Q-T Interval 402 ms    QTc Calculation (Bazett) 483 ms    P Axis 61 degrees    R Axis 89 degrees    T Axis 53 degrees           The patient was counseled at length about the risks of avtar Covid-19 in the niecy-operative and post-operative states including the recovery window of their procedure. The patient was made aware that avtar Covid-19 after a surgical procedure may worsen their prognosis for recovering from the virus and lend to a higher morbidity and or mortality risk. The patient was given the options of postponing their procedure. All of the risks, benefits, and alternatives were discussed. The patient  does wish to proceed with the procedure. Assessment:     Diagnosis Orders   1. Pelvic pain        2. Endometriosis        3. Hypothyroidism        4.  Family planning        5. Subserous leiomyoma of uterus-Pedunculated posteriorly            Patient Active Problem List    Diagnosis Date Noted    Atypical squamous cells of undetermined significance on cytologic smear of cervix (ASC-US) + HRHPV Other 07/13/2021     Priority: High     Overview Note:     5/5/2022 Colposcopy with ECC completed  5/2023 PAP (if colposcopy in 2022 is negative)      Subserous leiomyoma of uterus-Pedunculated posteriorly 02/05/2021     Priority: High    Endometriosis Stage 3 by photos 12/30/2020     Priority: High     Overview Note:     Pt was in test pool for [place] with Success      Menorrhagia with regular cycle 09/28/2016     Priority: High    Hypothyroidism 12/30/2020     Priority: Medium    Depression 12/30/2020     Priority: Medium     Overview Note:     On Clonopin      Acne vulgaris 09/28/2016    Anxiety          Permanent Sterilization Completed: No     Plan:  Operative Laparoscopy with FOI/KASSANDRA as pt is on Lupron with good response. Chromopertubation  Possible Myomectomy of pedunculated fibroid  No orders of the defined types were placed in this encounter. Counseling: The patient was counseled on all options both medical and surgical, conservative as well as definitive. She has elected to proceed with the procedure as stated above. The patient was counseled on the procedure. Risks and complications were reviewed in detail. The patients orders, labs, consents have been completed. The history and physical as well as all supporting surgical documentation will be forwarded to the pre-operative holding area. The patient is aware that this procedure may not alleviate her symptoms. That there may be a necessity for a second surgery and that there may be an incomplete removal of abnormal tissue.                    ________________________________________D. O.  Date:_______________  Royce Leventhal, DO          The patient, Darryl Diego is a 33 y.o. female, was seen with a total time spent of 20 minutes for the visit on this date of service by the E/M provider. The time component had both face to face and non face to face time spent in determining the total time component. Counseling and education regarding her diagnosis listed below and her options regarding those diagnoses were also included in determining her time component. Diagnosis Orders   1. Pelvic pain        2. Endometriosis        3. Hypothyroidism        4. Family planning        5. Subserous leiomyoma of uterus-Pedunculated posteriorly             The patient had her preventative health maintenance recommendations and follow-up reviewed with her at the completion of her visit.

## 2022-11-01 NOTE — H&P
[id number] [facility] Gynecology  [facility] [address]; [address]  [place], [address] [facility]  [phone] mn [phone] Fax        Anuel Garcia  1990  Primary Care Physician: BRUCE Sellers NP        [address]: [facility]      2022  MEDICAID CONSENT COMPLETED: No      HPI: [facility] Perez Garcia is a 33 y.o. female   she is being seen for the problems listed below and is planning surgical intervention. She was counseled on all conservative medical and surgical options as well as definitive procedures as well. 1. Pelvic pain    2. Endometriosis    3. Hypothyroidism    4. Family planning    5.  Subserous leiomyoma of uterus-Pedunculated posteriorly          Relevant Past History:   Patient Active Problem List    Diagnosis Date Noted    Atypical squamous cells of undetermined significance on cytologic smear of cervix (ASC-US) + HRHPV Other 2021     Priority: High     2022 Colposcopy with ECC completed  2023 PAP (if colposcopy in  is negative)      Subserous leiomyoma of uterus-Pedunculated posteriorly 2021     Priority: High    Endometriosis Stage 3 by photos 2020     Priority: High     Pt was in test pool for [place] with Success      Menorrhagia with regular cycle 2016     Priority: High    Hypothyroidism 2020     Priority: Medium    Depression 2020     Priority: Medium     On Clonopin      Acne vulgaris 2016    Anxiety          OB History    Para Term  AB Living   0 0 0 0 0 0   SAB IAB Ectopic Molar Multiple Live Births   0 0 0 0 0 0       Past Medical History:   Diagnosis Date    Anxiety     ON RX    Hyperlipidemia     BOARDERLLINE NO MEDS    Kidney stones -2016    X 6-PASSED ALL ON OWN    URI (upper respiratory infection) 10/2016    WAS ON ATB, INHALER ORAL COUGH MED, RESOLVED NOW       Past Surgical History:   Procedure Laterality Date    ABDOMINAL EXPLORATION SURGERY  11/15/2016    D&C, chromotubation, fulgration    OVARY SURGERY Right 11/15/2016    biposy of cyst       Social History     Socioeconomic History    Marital status:      Spouse name: Not on file    Number of children: Not on file    Years of education: Not on file    Highest education level: Not on file   Occupational History    Not on file   Tobacco Use    Smoking status: Every Day     Packs/day: 0.50     Types: Cigarettes    Smokeless tobacco: Never   Vaping Use    Vaping Use: Never used   Substance and Sexual Activity    Alcohol use: Not Currently     Comment: LIQUOR-4 DRINKS A YEAR    Drug use: No    Sexual activity: Yes     Partners: Male   Other Topics Concern    Not on file   Social History Narrative    Not on file     Social Determinants of Health     Financial Resource Strain: Not on file   Food Insecurity: Not on file   Transportation Needs: Not on file   Physical Activity: Not on file   Stress: Not on file   Social Connections: Not on file   Intimate Partner Violence: Not on file   Housing Stability: Not on file       Psychosocial History: stable    MEDICATIONS:  Current Outpatient Medications   Medication Sig Dispense Refill    hydrOXYzine HCl (ATARAX) 50 MG tablet TAKE 1 TABLET BY MOUTH EVERY SIX HOURS AS NEEDED FOR ANXIETY      acetaminophen-codeine (TYLENOL #3) 300-30 MG per tablet TAKE 1 TABLET BY MOUTH EVERY FOUR TO SIX HOURS      NP THYROID 90 MG tablet TAKE 1 TABLET BY MOUTH EVERY DAY      vitamin D (CHOLECALCIFEROL) 125 MCG (5000 UT) CAPS capsule Take 5,000 Units by mouth daily      clonazePAM (KLONOPIN) 0.5 MG tablet TK 1/2 TO 1 T PO BID  1    MAGNESIUM PO Take 250 mg by mouth daily        No current facility-administered medications for this visit. ALLERGIES:  Allergies as of 11/01/2022 - Fully Reviewed 11/01/2022   Allergen Reaction Noted    Ibuprofen Other (See Comments) 11/08/2016     GI UPSET      REVIEW OF SYSTEMS:      General appearance:Appears healthy. Alert; in no acute distress. Pleasant. HEENT: Unremarkable   CARDIOVASCULAR: Denies: chest pain, dyspnea on exertion, palpitations  RESPIRATORY: Denies: cough, shortness of breath, wheezing  GI: Denies: abdominal pain, flank pain, nausea, vomiting, diarrhea  : Denies: dysuria, frequency/urgency, hematuria, pelvic pain  MUSCULOSKELETAL: Denies: back pain, joint swelling, muscle pain  SKIN: Denies: rash, hives. HEMATOLOGIC: Denies Bleeding Disorder, Coagulopathy or Transfusion  NEUROLOGIC: Denies Migraines, Headaches, CVA, TIA  ENDOCRINE: + Hypothyroidism                PHYSICAL EXAM:  Blood pressure 114/70, height 5' 2\" (1.575 m), weight 164 lb (74.4 kg), not currently breastfeeding. General Appearance:  awake, alert, oriented, in no acute distress, well developed, well nourished, in no acute distress   Skin:  Skin color, texture, turgor normal. No rashes or lesions  Mouth/Throat:  Mucosa moist.  No lesions. Pharynx without erythema, edema or exudate. Lungs:  Normal expansion. Clear to auscultation. No rales, rhonchi, or wheezing. Heart:  Heart sounds are normal.  Regular rate and rhythm without murmur, gallop or rub. Breast:  Declined  Abdomen:  Soft, non-tender, normal bowel sounds. No bruits, organomegaly or masses. Extremities: Extremities warm to touch, pink, with no edema. Musculoskeletal:  negative  Peripheral Pulses:  Normal  Neurologic:  Gait normal. Reflexes normal and symmetric. Sensation grossly intact. Female Urogen:  Declined  Female Rectal: Declined        Diagnostics:  No results found.     Lab Results:  Results for orders placed or performed during the hospital encounter of 08/15/22   D-Dimer, Quantitative   Result Value Ref Range    D-Dimer, Quant <0.27 0.00 - 0.59 mg/L FEU   CBC with Auto Differential   Result Value Ref Range    WBC 10.2 3.5 - 11.0 k/uL    RBC 4.28 4.0 - 5.2 m/uL    Hemoglobin 14.0 12.0 - 16.0 g/dL    Hematocrit 40.3 36 - 46 %    MCV 94.2 80 - 100 fL    MCH 32.8 26 - 34 pg    MCHC 34.8 31 - 37 g/dL    RDW 12.9 11.5 - 14.9 %    Platelets 990 575 - 226 k/uL    MPV 7.5 6.0 - 12.0 fL    Seg Neutrophils 52 36 - 66 %    Lymphocytes 39 24 - 44 %    Monocytes 4 1 - 7 %    Eosinophils % 4 0 - 4 %    Basophils 1 0 - 2 %    Segs Absolute 5.30 1.3 - 9.1 k/uL    Absolute Lymph # 4.00 1.0 - 4.8 k/uL    Absolute Mono # 0.40 0.1 - 1.3 k/uL    Absolute Eos # 0.40 0.0 - 0.4 k/uL    Basophils Absolute 0.10 0.0 - 0.2 k/uL   Basic Metabolic Panel   Result Value Ref Range    Glucose 114 (H) 70 - 99 mg/dL    BUN 11 6 - 20 mg/dL    Creatinine 0.76 0.50 - 0.90 mg/dL    Calcium 9.2 8.6 - 10.4 mg/dL    Sodium 138 135 - 144 mmol/L    Potassium 3.7 3.7 - 5.3 mmol/L    Chloride 104 98 - 107 mmol/L    CO2 23 20 - 31 mmol/L    Anion Gap 11 9 - 17 mmol/L    GFR Non-African American >60 >60 mL/min    GFR African American >60 >60 mL/min    GFR Comment         Troponin   Result Value Ref Range    Troponin, High Sensitivity <6 0 - 14 ng/L   Troponin   Result Value Ref Range    Troponin, High Sensitivity <6 0 - 14 ng/L   EKG 12 Lead   Result Value Ref Range    Ventricular Rate 87 BPM    Atrial Rate 87 BPM    P-R Interval 160 ms    QRS Duration 90 ms    Q-T Interval 402 ms    QTc Calculation (Bazett) 483 ms    P Axis 61 degrees    R Axis 89 degrees    T Axis 53 degrees           The patient was counseled at length about the risks of avtar Covid-19 in the niecy-operative and post-operative states including the recovery window of their procedure. The patient was made aware that avtar Covid-19 after a surgical procedure may worsen their prognosis for recovering from the virus and lend to a higher morbidity and or mortality risk. The patient was given the options of postponing their procedure. All of the risks, benefits, and alternatives were discussed. The patient  does wish to proceed with the procedure. Assessment:     Diagnosis Orders   1. Pelvic pain        2. Endometriosis        3. Hypothyroidism        4.  Family planning        5. Subserous leiomyoma of uterus-Pedunculated posteriorly            Patient Active Problem List    Diagnosis Date Noted    Atypical squamous cells of undetermined significance on cytologic smear of cervix (ASC-US) + HRHPV Other 07/13/2021     Priority: High     Overview Note:     5/5/2022 Colposcopy with ECC completed  5/2023 PAP (if colposcopy in 2022 is negative)      Subserous leiomyoma of uterus-Pedunculated posteriorly 02/05/2021     Priority: High    Endometriosis Stage 3 by photos 12/30/2020     Priority: High     Overview Note:     Pt was in test pool for [place] with Success      Menorrhagia with regular cycle 09/28/2016     Priority: High    Hypothyroidism 12/30/2020     Priority: Medium    Depression 12/30/2020     Priority: Medium     Overview Note:     On Clonopin      Acne vulgaris 09/28/2016    Anxiety          Permanent Sterilization Completed: No     Plan:  Operative Laparoscopy with FOI/KASSANDRA as pt is on Lupron with good response. Chromopertubation  Possible Myomectomy of pedunculated fibroid  No orders of the defined types were placed in this encounter. Counseling: The patient was counseled on all options both medical and surgical, conservative as well as definitive. She has elected to proceed with the procedure as stated above. The patient was counseled on the procedure. Risks and complications were reviewed in detail. The patients orders, labs, consents have been completed. The history and physical as well as all supporting surgical documentation will be forwarded to the pre-operative holding area. The patient is aware that this procedure may not alleviate her symptoms. That there may be a necessity for a second surgery and that there may be an incomplete removal of abnormal tissue.                    ________________________________________D. O.  Date:_______________  Roger Puri DO

## 2022-11-03 ENCOUNTER — HOSPITAL ENCOUNTER (OUTPATIENT)
Dept: PREADMISSION TESTING | Age: 32
Discharge: HOME | End: 2022-11-07
Attending: OBSTETRICS & GYNECOLOGY | Admitting: OBSTETRICS & GYNECOLOGY
Payer: COMMERCIAL

## 2022-11-03 VITALS
SYSTOLIC BLOOD PRESSURE: 112 MMHG | RESPIRATION RATE: 16 BRPM | OXYGEN SATURATION: 100 % | BODY MASS INDEX: 30.36 KG/M2 | WEIGHT: 165 LBS | HEART RATE: 74 BPM | TEMPERATURE: 97.9 F | HEIGHT: 62 IN | DIASTOLIC BLOOD PRESSURE: 63 MMHG

## 2022-11-03 DIAGNOSIS — Z01.818 PREOP TESTING: ICD-10-CM

## 2022-11-03 LAB
ABO/RH: NORMAL
ABSOLUTE EOS #: 0.3 K/UL (ref 0–0.4)
ABSOLUTE LYMPH #: 2.7 K/UL (ref 1–4.8)
ABSOLUTE MONO #: 0.3 K/UL (ref 0.1–1.3)
ANION GAP SERPL CALCULATED.3IONS-SCNC: 11 MMOL/L (ref 9–17)
ANTIBODY SCREEN: NEGATIVE
ARM BAND NUMBER: NORMAL
BACTERIA: NORMAL
BASOPHILS # BLD: 1 % (ref 0–2)
BASOPHILS ABSOLUTE: 0 K/UL (ref 0–0.2)
BILIRUBIN URINE: NEGATIVE
BUN BLDV-MCNC: 10 MG/DL (ref 6–20)
CALCIUM SERPL-MCNC: 10 MG/DL (ref 8.6–10.4)
CASTS UA: NORMAL /LPF
CHLORIDE BLD-SCNC: 102 MMOL/L (ref 98–107)
CO2: 25 MMOL/L (ref 20–31)
COLOR: YELLOW
CREAT SERPL-MCNC: 0.75 MG/DL (ref 0.5–0.9)
EOSINOPHILS RELATIVE PERCENT: 4 % (ref 0–4)
EPITHELIAL CELLS UA: NORMAL /HPF
EXPIRATION DATE: NORMAL
GFR SERPL CREATININE-BSD FRML MDRD: >60 ML/MIN/1.73M2
GLUCOSE BLD-MCNC: 110 MG/DL (ref 70–99)
GLUCOSE URINE: NEGATIVE
HCG QUANTITATIVE: <1 MIU/ML
HCT VFR BLD CALC: 42 % (ref 36–46)
HEMOGLOBIN: 14.8 G/DL (ref 12–16)
KETONES, URINE: NEGATIVE
LEUKOCYTE ESTERASE, URINE: NEGATIVE
LYMPHOCYTES # BLD: 32 % (ref 24–44)
MCH RBC QN AUTO: 33.4 PG (ref 26–34)
MCHC RBC AUTO-ENTMCNC: 35.1 G/DL (ref 31–37)
MCV RBC AUTO: 95 FL (ref 80–100)
MONOCYTES # BLD: 4 % (ref 1–7)
NITRITE, URINE: NEGATIVE
PDW BLD-RTO: 13.1 % (ref 11.5–14.9)
PH UA: 7.5 (ref 5–8)
PLATELET # BLD: 316 K/UL (ref 150–450)
PMV BLD AUTO: 7.7 FL (ref 6–12)
POTASSIUM SERPL-SCNC: 4.1 MMOL/L (ref 3.7–5.3)
PROTEIN UA: NEGATIVE
RBC # BLD: 4.42 M/UL (ref 4–5.2)
RBC UA: NORMAL /HPF
SEG NEUTROPHILS: 59 % (ref 36–66)
SEGMENTED NEUTROPHILS ABSOLUTE COUNT: 5 K/UL (ref 1.3–9.1)
SODIUM BLD-SCNC: 138 MMOL/L (ref 135–144)
SPECIFIC GRAVITY UA: 1.01 (ref 1–1.03)
TURBIDITY: CLEAR
URINE HGB: ABNORMAL
UROBILINOGEN, URINE: NORMAL
WBC # BLD: 8.4 K/UL (ref 3.5–11)
WBC UA: NORMAL /HPF

## 2022-11-03 PROCEDURE — 87086 URINE CULTURE/COLONY COUNT: CPT

## 2022-11-03 PROCEDURE — 86900 BLOOD TYPING SEROLOGIC ABO: CPT

## 2022-11-03 PROCEDURE — 80048 BASIC METABOLIC PNL TOTAL CA: CPT

## 2022-11-03 PROCEDURE — 85025 COMPLETE CBC W/AUTO DIFF WBC: CPT

## 2022-11-03 PROCEDURE — 81001 URINALYSIS AUTO W/SCOPE: CPT

## 2022-11-03 PROCEDURE — 86901 BLOOD TYPING SEROLOGIC RH(D): CPT

## 2022-11-03 PROCEDURE — 86850 RBC ANTIBODY SCREEN: CPT

## 2022-11-03 PROCEDURE — 84702 CHORIONIC GONADOTROPIN TEST: CPT

## 2022-11-03 PROCEDURE — 36415 COLL VENOUS BLD VENIPUNCTURE: CPT

## 2022-11-03 NOTE — DISCHARGE INSTRUCTIONS
Pre-op Instructions For Out-Patient Surgery    Medication Instructions:  Please stop herbs and any supplements now (includes vitamins and minerals). Please contact your surgeon and prescribing physician for pre-op instructions for any blood thinners. If you have inhalers/aerosol treatments at home, please use them the morning of your surgery and bring the inhalers with you to the hospital.    Please take the following medications the morning of your surgery with a sip of water:    Thyroid, Clonazepam    Surgery Instructions:  After midnight before surgery:  Do not eat or drink anything, including water, mints, gum, and hard candy. You may brush your teeth without swallowing. No smoking, chewing tobacco, or street drugs. Please shower or bathe before surgery. If you were given Surgical Scrub Chlorhexidine Gluconate Liquid (CHG), please shower the night before and the morning of your surgery following the detailed instructions you received during your pre-admission visit. Please do not wear any cologne, lotion, powder, deodorant, jewelry, piercings, perfume, makeup, nail polish, hair accessories, or hair spray on the day of surgery. Wear loose comfortable clothing. Leave your valuables at home. Bring a storage case for any glasses/contacts. An adult who is responsible for you MUST drive you home and should be with you for the first 24 hours after surgery. If having out-patient knee and foot surgeries, please arrange for planned crutches, walker, or wheelchair before arriving to the hospital.    The Day of Surgery:  Arrive at [address] [place] [address] Surgery Entrance at the time directed by your surgeon and check in at the desk. If you have a living will or healthcare power of , please bring a copy. You will be taken to the pre-op holding area where you will be prepared for surgery.   A physical assessment will be performed by a nurse practitioner or house officer. Your IV will be started and you will meet your anesthesiologist.    When you go to surgery, your family will be directed to the surgical waiting room, where the doctor should speak with them after your surgery. After surgery, you will be taken to the recovery room then when you are awake and stable you will go to the short stay unit for preparation to be discharged. If you use a Bi-PAP or C-PAP machine, please bring it with you and leave it in the car in case it is needed in recovery room.

## 2022-11-04 LAB
CULTURE: NO GROWTH
SPECIMEN DESCRIPTION: NORMAL

## 2022-11-16 ENCOUNTER — ANESTHESIA EVENT (OUTPATIENT)
Dept: OPERATING ROOM | Age: 32
End: 2022-11-16
Payer: COMMERCIAL

## 2022-11-16 NOTE — PRE-PROCEDURE INSTRUCTIONS
No answer, left message ? Unable to leave message ? When were you told to arrive at hospital ?  0900    Do you have a  ?yes    Are you on any blood thinners ? no                 If yes when did you stop taking ? Do you have your prep Rx filled and instruction ? N/a    Nothing to eat the day before , only clear liquids. n/a    Are you experiencing any covid symptoms ? no    Do you have any infections or rash we should be aware of ?no      Do you have the Hibiclens soap to use the night before and the morning of surgery ? yes    Nothing to eat or drink after midnight, only a sip of water to take any medication instructed to take the night before. yes  Wear comfortable clothing, leave any valuables at home, remove any jewelry and body piercing yes.

## 2022-11-17 ENCOUNTER — ANESTHESIA (OUTPATIENT)
Dept: OPERATING ROOM | Age: 32
End: 2022-11-17
Payer: COMMERCIAL

## 2022-11-17 ENCOUNTER — HOSPITAL ENCOUNTER (OUTPATIENT)
Age: 32
Setting detail: OUTPATIENT SURGERY
Discharge: HOME | End: 2022-11-17
Attending: OBSTETRICS & GYNECOLOGY | Admitting: OBSTETRICS & GYNECOLOGY
Payer: COMMERCIAL

## 2022-11-17 VITALS
TEMPERATURE: 97.3 F | DIASTOLIC BLOOD PRESSURE: 68 MMHG | RESPIRATION RATE: 14 BRPM | OXYGEN SATURATION: 97 % | HEIGHT: 62 IN | BODY MASS INDEX: 30.36 KG/M2 | HEART RATE: 56 BPM | WEIGHT: 165 LBS | SYSTOLIC BLOOD PRESSURE: 111 MMHG

## 2022-11-17 DIAGNOSIS — G89.18 POST-OP PAIN: Primary | ICD-10-CM

## 2022-11-17 PROBLEM — R10.2 PELVIC PAIN: Status: ACTIVE | Noted: 2022-11-17

## 2022-11-17 PROBLEM — Z98.890 H/O LAPAROSCOPY: Status: ACTIVE | Noted: 2022-11-17

## 2022-11-17 LAB — HCG, PREGNANCY URINE (POC): NEGATIVE

## 2022-11-17 PROCEDURE — 6360000002 HC RX W HCPCS: Performed by: ANESTHESIOLOGY

## 2022-11-17 PROCEDURE — 7100000011 HC PHASE II RECOVERY - ADDTL 15 MIN: Performed by: OBSTETRICS & GYNECOLOGY

## 2022-11-17 PROCEDURE — 7100000000 HC PACU RECOVERY - FIRST 15 MIN: Performed by: OBSTETRICS & GYNECOLOGY

## 2022-11-17 PROCEDURE — 81025 URINE PREGNANCY TEST: CPT

## 2022-11-17 PROCEDURE — 6360000002 HC RX W HCPCS: Performed by: OBSTETRICS & GYNECOLOGY

## 2022-11-17 PROCEDURE — 2500000003 HC RX 250 WO HCPCS: Performed by: NURSE ANESTHETIST, CERTIFIED REGISTERED

## 2022-11-17 PROCEDURE — 6370000000 HC RX 637 (ALT 250 FOR IP): Performed by: ANESTHESIOLOGY

## 2022-11-17 PROCEDURE — 3600000013 HC SURGERY LEVEL 3 ADDTL 15MIN: Performed by: OBSTETRICS & GYNECOLOGY

## 2022-11-17 PROCEDURE — 7100000031 HC ASPR PHASE II RECOVERY - ADDTL 15 MIN: Performed by: OBSTETRICS & GYNECOLOGY

## 2022-11-17 PROCEDURE — 7100000030 HC ASPR PHASE II RECOVERY - FIRST 15 MIN: Performed by: OBSTETRICS & GYNECOLOGY

## 2022-11-17 PROCEDURE — 3600000003 HC SURGERY LEVEL 3 BASE: Performed by: OBSTETRICS & GYNECOLOGY

## 2022-11-17 PROCEDURE — 2580000003 HC RX 258: Performed by: OBSTETRICS & GYNECOLOGY

## 2022-11-17 PROCEDURE — 58662 LAPAROSCOPY EXCISE LESIONS: CPT | Performed by: OBSTETRICS & GYNECOLOGY

## 2022-11-17 PROCEDURE — 6360000002 HC RX W HCPCS: Performed by: NURSE ANESTHETIST, CERTIFIED REGISTERED

## 2022-11-17 PROCEDURE — 2720000010 HC SURG SUPPLY STERILE: Performed by: OBSTETRICS & GYNECOLOGY

## 2022-11-17 PROCEDURE — 3700000000 HC ANESTHESIA ATTENDED CARE: Performed by: OBSTETRICS & GYNECOLOGY

## 2022-11-17 PROCEDURE — 3700000001 HC ADD 15 MINUTES (ANESTHESIA): Performed by: OBSTETRICS & GYNECOLOGY

## 2022-11-17 PROCEDURE — 2709999900 HC NON-CHARGEABLE SUPPLY: Performed by: OBSTETRICS & GYNECOLOGY

## 2022-11-17 PROCEDURE — 7100000001 HC PACU RECOVERY - ADDTL 15 MIN: Performed by: OBSTETRICS & GYNECOLOGY

## 2022-11-17 PROCEDURE — 58350 REOPEN FALLOPIAN TUBE: CPT | Performed by: OBSTETRICS & GYNECOLOGY

## 2022-11-17 PROCEDURE — 2500000003 HC RX 250 WO HCPCS: Performed by: OBSTETRICS & GYNECOLOGY

## 2022-11-17 PROCEDURE — 7100000010 HC PHASE II RECOVERY - FIRST 15 MIN: Performed by: OBSTETRICS & GYNECOLOGY

## 2022-11-17 RX ORDER — SODIUM CHLORIDE 0.9 % (FLUSH) 0.9 %
5-40 SYRINGE (ML) INJECTION PRN
Status: DISCONTINUED | OUTPATIENT
Start: 2022-11-17 | End: 2022-11-17 | Stop reason: HOSPADM

## 2022-11-17 RX ORDER — SODIUM CHLORIDE, SODIUM LACTATE, POTASSIUM CHLORIDE, CALCIUM CHLORIDE 600; 310; 30; 20 MG/100ML; MG/100ML; MG/100ML; MG/100ML
INJECTION, SOLUTION INTRAVENOUS CONTINUOUS
Status: DISCONTINUED | OUTPATIENT
Start: 2022-11-17 | End: 2022-11-17 | Stop reason: HOSPADM

## 2022-11-17 RX ORDER — HYDROCODONE BITARTRATE AND ACETAMINOPHEN 5; 325 MG/1; MG/1
1 TABLET ORAL EVERY 6 HOURS PRN
Qty: 20 TABLET | Refills: 0 | Status: SHIPPED | OUTPATIENT
Start: 2022-11-17 | End: 2022-11-22

## 2022-11-17 RX ORDER — ACETAMINOPHEN 500 MG
1000 TABLET ORAL ONCE
Status: COMPLETED | OUTPATIENT
Start: 2022-11-17 | End: 2022-11-17

## 2022-11-17 RX ORDER — SODIUM CHLORIDE 9 MG/ML
INJECTION, SOLUTION INTRAVENOUS PRN
Status: DISCONTINUED | OUTPATIENT
Start: 2022-11-17 | End: 2022-11-17 | Stop reason: HOSPADM

## 2022-11-17 RX ORDER — FENTANYL CITRATE 50 UG/ML
INJECTION, SOLUTION INTRAMUSCULAR; INTRAVENOUS PRN
Status: DISCONTINUED | OUTPATIENT
Start: 2022-11-17 | End: 2022-11-17 | Stop reason: SDUPTHER

## 2022-11-17 RX ORDER — SODIUM CHLORIDE 0.9 % (FLUSH) 0.9 %
5-40 SYRINGE (ML) INJECTION EVERY 12 HOURS SCHEDULED
Status: DISCONTINUED | OUTPATIENT
Start: 2022-11-17 | End: 2022-11-17 | Stop reason: HOSPADM

## 2022-11-17 RX ORDER — ONDANSETRON 2 MG/ML
4 INJECTION INTRAMUSCULAR; INTRAVENOUS
Status: COMPLETED | OUTPATIENT
Start: 2022-11-17 | End: 2022-11-17

## 2022-11-17 RX ORDER — HYDROCODONE BITARTRATE AND ACETAMINOPHEN 5; 325 MG/1; MG/1
1 TABLET ORAL EVERY 6 HOURS PRN
Status: COMPLETED | OUTPATIENT
Start: 2022-11-17 | End: 2022-11-17

## 2022-11-17 RX ORDER — METOCLOPRAMIDE HYDROCHLORIDE 5 MG/ML
10 INJECTION INTRAMUSCULAR; INTRAVENOUS
Status: DISCONTINUED | OUTPATIENT
Start: 2022-11-17 | End: 2022-11-17 | Stop reason: HOSPADM

## 2022-11-17 RX ORDER — HYDRALAZINE HYDROCHLORIDE 20 MG/ML
10 INJECTION INTRAMUSCULAR; INTRAVENOUS
Status: DISCONTINUED | OUTPATIENT
Start: 2022-11-17 | End: 2022-11-17 | Stop reason: HOSPADM

## 2022-11-17 RX ORDER — PROPOFOL 10 MG/ML
INJECTION, EMULSION INTRAVENOUS PRN
Status: DISCONTINUED | OUTPATIENT
Start: 2022-11-17 | End: 2022-11-17 | Stop reason: SDUPTHER

## 2022-11-17 RX ORDER — MIDAZOLAM HYDROCHLORIDE 1 MG/ML
INJECTION INTRAMUSCULAR; INTRAVENOUS PRN
Status: DISCONTINUED | OUTPATIENT
Start: 2022-11-17 | End: 2022-11-17 | Stop reason: SDUPTHER

## 2022-11-17 RX ORDER — KETOROLAC TROMETHAMINE 30 MG/ML
INJECTION, SOLUTION INTRAMUSCULAR; INTRAVENOUS PRN
Status: DISCONTINUED | OUTPATIENT
Start: 2022-11-17 | End: 2022-11-17 | Stop reason: SDUPTHER

## 2022-11-17 RX ORDER — GABAPENTIN 300 MG/1
300 CAPSULE ORAL ONCE
Status: COMPLETED | OUTPATIENT
Start: 2022-11-17 | End: 2022-11-17

## 2022-11-17 RX ORDER — DEXAMETHASONE SODIUM PHOSPHATE 4 MG/ML
INJECTION, SOLUTION INTRA-ARTICULAR; INTRALESIONAL; INTRAMUSCULAR; INTRAVENOUS; SOFT TISSUE PRN
Status: DISCONTINUED | OUTPATIENT
Start: 2022-11-17 | End: 2022-11-17 | Stop reason: SDUPTHER

## 2022-11-17 RX ORDER — SCOLOPAMINE TRANSDERMAL SYSTEM 1 MG/1
1 PATCH, EXTENDED RELEASE TRANSDERMAL ONCE
Status: DISCONTINUED | OUTPATIENT
Start: 2022-11-17 | End: 2022-11-17 | Stop reason: HOSPADM

## 2022-11-17 RX ORDER — SODIUM CHLORIDE 9 MG/ML
25 INJECTION, SOLUTION INTRAVENOUS PRN
Status: DISCONTINUED | OUTPATIENT
Start: 2022-11-17 | End: 2022-11-17 | Stop reason: HOSPADM

## 2022-11-17 RX ORDER — ROCURONIUM BROMIDE 10 MG/ML
INJECTION, SOLUTION INTRAVENOUS PRN
Status: DISCONTINUED | OUTPATIENT
Start: 2022-11-17 | End: 2022-11-17 | Stop reason: SDUPTHER

## 2022-11-17 RX ORDER — FENTANYL CITRATE 0.05 MG/ML
25 INJECTION, SOLUTION INTRAMUSCULAR; INTRAVENOUS EVERY 5 MIN PRN
Status: DISCONTINUED | OUTPATIENT
Start: 2022-11-17 | End: 2022-11-17 | Stop reason: HOSPADM

## 2022-11-17 RX ORDER — BUPIVACAINE HYDROCHLORIDE 5 MG/ML
INJECTION, SOLUTION EPIDURAL; INTRACAUDAL PRN
Status: DISCONTINUED | OUTPATIENT
Start: 2022-11-17 | End: 2022-11-17 | Stop reason: ALTCHOICE

## 2022-11-17 RX ORDER — LABETALOL HYDROCHLORIDE 5 MG/ML
10 INJECTION, SOLUTION INTRAVENOUS
Status: DISCONTINUED | OUTPATIENT
Start: 2022-11-17 | End: 2022-11-17 | Stop reason: HOSPADM

## 2022-11-17 RX ORDER — ONDANSETRON 2 MG/ML
INJECTION INTRAMUSCULAR; INTRAVENOUS PRN
Status: DISCONTINUED | OUTPATIENT
Start: 2022-11-17 | End: 2022-11-17 | Stop reason: SDUPTHER

## 2022-11-17 RX ORDER — LIDOCAINE HYDROCHLORIDE 20 MG/ML
INJECTION, SOLUTION EPIDURAL; INFILTRATION; INTRACAUDAL; PERINEURAL PRN
Status: DISCONTINUED | OUTPATIENT
Start: 2022-11-17 | End: 2022-11-17 | Stop reason: SDUPTHER

## 2022-11-17 RX ORDER — DIPHENHYDRAMINE HYDROCHLORIDE 50 MG/ML
12.5 INJECTION INTRAMUSCULAR; INTRAVENOUS
Status: DISCONTINUED | OUTPATIENT
Start: 2022-11-17 | End: 2022-11-17 | Stop reason: HOSPADM

## 2022-11-17 RX ORDER — LIDOCAINE HYDROCHLORIDE 10 MG/ML
1 INJECTION, SOLUTION EPIDURAL; INFILTRATION; INTRACAUDAL; PERINEURAL
Status: DISCONTINUED | OUTPATIENT
Start: 2022-11-17 | End: 2022-11-17 | Stop reason: HOSPADM

## 2022-11-17 RX ADMIN — HYDROCODONE BITARTRATE AND ACETAMINOPHEN 1 TABLET: 5; 325 TABLET ORAL at 13:12

## 2022-11-17 RX ADMIN — MIDAZOLAM 2 MG: 1 INJECTION INTRAMUSCULAR; INTRAVENOUS at 10:55

## 2022-11-17 RX ADMIN — SODIUM CHLORIDE, POTASSIUM CHLORIDE, SODIUM LACTATE AND CALCIUM CHLORIDE: 600; 310; 30; 20 INJECTION, SOLUTION INTRAVENOUS at 10:35

## 2022-11-17 RX ADMIN — PHENYLEPHRINE HYDROCHLORIDE 50 MCG: 10 INJECTION INTRAVENOUS at 11:17

## 2022-11-17 RX ADMIN — FENTANYL CITRATE 25 MCG: 50 INJECTION INTRAMUSCULAR; INTRAVENOUS at 12:39

## 2022-11-17 RX ADMIN — PHENYLEPHRINE HYDROCHLORIDE 50 MCG: 10 INJECTION INTRAVENOUS at 11:22

## 2022-11-17 RX ADMIN — ONDANSETRON 4 MG: 2 INJECTION INTRAMUSCULAR; INTRAVENOUS at 11:07

## 2022-11-17 RX ADMIN — FENTANYL CITRATE 100 MCG: 50 INJECTION, SOLUTION INTRAMUSCULAR; INTRAVENOUS at 12:16

## 2022-11-17 RX ADMIN — PROPOFOL 200 MG: 10 INJECTION, EMULSION INTRAVENOUS at 11:01

## 2022-11-17 RX ADMIN — FENTANYL CITRATE 50 MCG: 50 INJECTION, SOLUTION INTRAMUSCULAR; INTRAVENOUS at 11:01

## 2022-11-17 RX ADMIN — FENTANYL CITRATE 50 MCG: 50 INJECTION, SOLUTION INTRAMUSCULAR; INTRAVENOUS at 11:28

## 2022-11-17 RX ADMIN — DEXAMETHASONE SODIUM PHOSPHATE 4 MG: 4 INJECTION, SOLUTION INTRAMUSCULAR; INTRAVENOUS at 11:07

## 2022-11-17 RX ADMIN — SUGAMMADEX 200 MG: 100 INJECTION, SOLUTION INTRAVENOUS at 12:02

## 2022-11-17 RX ADMIN — GABAPENTIN 300 MG: 300 CAPSULE ORAL at 10:28

## 2022-11-17 RX ADMIN — ROCURONIUM BROMIDE 40 MG: 10 INJECTION, SOLUTION INTRAVENOUS at 11:01

## 2022-11-17 RX ADMIN — ACETAMINOPHEN 1000 MG: 500 TABLET ORAL at 10:28

## 2022-11-17 RX ADMIN — LIDOCAINE HYDROCHLORIDE 80 MG: 20 INJECTION, SOLUTION EPIDURAL; INFILTRATION; INTRACAUDAL; PERINEURAL at 11:01

## 2022-11-17 RX ADMIN — ONDANSETRON 4 MG: 2 INJECTION INTRAMUSCULAR; INTRAVENOUS at 12:42

## 2022-11-17 RX ADMIN — KETOROLAC TROMETHAMINE 30 MG: 30 INJECTION, SOLUTION INTRAMUSCULAR; INTRAVENOUS at 11:49

## 2022-11-17 RX ADMIN — FENTANYL CITRATE 25 MCG: 50 INJECTION INTRAMUSCULAR; INTRAVENOUS at 12:47

## 2022-11-17 ASSESSMENT — PAIN DESCRIPTION - ORIENTATION
ORIENTATION: ANTERIOR
ORIENTATION: MID
ORIENTATION: MID

## 2022-11-17 ASSESSMENT — PAIN DESCRIPTION - LOCATION
LOCATION: INCISION;ABDOMEN
LOCATION: INCISION;ABDOMEN
LOCATION: PERINEUM
LOCATION: ABDOMEN

## 2022-11-17 ASSESSMENT — PAIN SCALES - GENERAL
PAINLEVEL_OUTOF10: 6
PAINLEVEL_OUTOF10: 5
PAINLEVEL_OUTOF10: 7
PAINLEVEL_OUTOF10: 5
PAINLEVEL_OUTOF10: 4

## 2022-11-17 ASSESSMENT — PAIN DESCRIPTION - PAIN TYPE
TYPE: SURGICAL PAIN
TYPE: SURGICAL PAIN

## 2022-11-17 ASSESSMENT — PAIN - FUNCTIONAL ASSESSMENT: PAIN_FUNCTIONAL_ASSESSMENT: 0-10

## 2022-11-17 ASSESSMENT — PAIN DESCRIPTION - DESCRIPTORS: DESCRIPTORS: BURNING;SHARP

## 2022-11-17 ASSESSMENT — LIFESTYLE VARIABLES: SMOKING_STATUS: 1

## 2022-11-17 NOTE — ANESTHESIA POSTPROCEDURE EVALUATION
Department of Anesthesiology  Postprocedure Note    Patient: Urvashi Rivera  MRN: [id number]  YOB: 1990  Date of evaluation: 11/17/2022      Procedure Summary     Date: 11/17/22 Room / Location: [address] [place] [address]: [facility]    Anesthesia Start: 2569 Anesthesia Stop: 1224    Procedure: OPERATIVE LAPARASCOPY  WITH FULGURATION OF IMPLANTS   AND CHROMOTUBATION (Abdomen) Diagnosis:       Endometriosis      (Endometriosis)    Surgeons: Sherry Khan DO Responsible Provider: Bisi Anand MD    Anesthesia Type: general ASA Status: 2          Anesthesia Type: No value filed.     Benny Phase I: Benny Score: 10    Benny Phase II: Benny Score: 10      Anesthesia Post Evaluation    Comments: POST- ANESTHESIA EVALUATION       Pt Name: Urvashi Rivera  MRN: [id number]  YOB: 1990  Date of evaluation: 11/17/2022  Time:  2:09 PM      /68   Pulse 56   Temp 97.3 °F (36.3 °C) (Infrared)   Resp 14   Ht 5' 2\" (1.575 m)   Wt 165 lb (74.8 kg)   LMP 11/01/2022 Comment: urine pregnancy negative  SpO2 97%   BMI 30.18 kg/m²      Consciousness Level  Awake  Cardiopulmonary Status  Stable  Pain Adequately Treated YES  Nausea / Vomiting  NO  Adequate Hydration  YES  Anesthesia Related Complications NONE      Electronically signed by Bisi Anand MD on 11/17/2022 at 2:09 PM

## 2022-11-17 NOTE — DISCHARGE INSTRUCTIONS
[address] [facility]  [facility] [address]  [address] [place] [address] [address], [address] [facility]  [phone]    Discharge Instructions for Laparoscopy     Laparoscopy is a video-assisted surgery done through several small incisions. Laparoscopic surgery has many benefits, including:   Smaller incisions    Less pain, risk of problems, and scarring    [place] hospital stay and shorter recovery time    What You Will Need   Wound care supplies as ordered by your doctor    Anything specific to care for the type of surgery you have had    Steps to [address] the incision area clean and dry. The incisions may be closed with dissolving sutures or a plastic dressing. In that case, specific wound care may not be needed. Wash your hands before changing the dressing. If you need to change or care for your wound, follow your doctor's specific instructions. Ask your doctor about when it is safe to shower, bathe, or soak in water. Diet    Avoid carbonated drinks for about 2 days after surgery. Your doctor may recommend a specific diet depending on the type of surgery you have had. Physical Activity    Ask your doctor when you will be able to return to work. Do not drive unless your doctor has given you permission to do so. Avoid lifting heavy objects for the prescribed amount of time. Medications    If you had to stop medicines before the procedure, ask your doctor when you can start again. Medicines often stopped include:   Anti-inflammatory drugs (eg, aspirin or ibuprofen)   Blood thinners like clopidogrel (Plavix) or warfarin (Coumadin)   Your doctor may prescribe pain medicine for you. If you are taking medicines, follow these general guidelines:   Take your medicine as directed. Do not change the amount or the schedule. Do not stop taking them without talking to your doctor. Do not share them. Know what the results and side effects. Report them to your doctor.     Some drugs can be dangerous when mixed. Talk to a doctor or pharmacist if you are taking more than one drug. This includes over-the-counter medicine and herb or dietary supplements. Plan ahead for refills so you do not run out. Lifestyle Changes    You and your doctor will plan lifestyle changes to aid in your recovery. You may need to consult with specialty doctors if biopsies are done. Results may indicate that you have a condition that needs more study or treatment. Follow-up   Your doctor should receive any biopsy results a few days after the procedure. Schedule a follow-up appointment as directed by your doctor. Call Your Doctor If Any of the Following Occurs   It is important for you to check your recovery once you leave the hospital. That way, you can alert your doctor to any problems. If any of the following occurs, call your doctor:   Signs of infection, including fever and chills    Redness, swelling, increasing pain, excessive bleeding, or discharge from the incision site    Nausea and/or vomiting that you cannot control with the medicines you were given    Pain    Headache, muscle aches, feeling faint or dizzy    Pain, burning, urgency or frequency of urination, or persistent bleeding in the urine    Difficulty urinating or having a bowel movement    Cough, shortness of breath or chest pain      If you think you have an emergency,  CALL 911  .        Patient Name: Jesus Cleary  Date: 11/17/2022  Time: 10:54 AM  MRN #: [id number]  [facility] #: [id number]      [id number] [facility]  Outpatient Surgery After Care Instructions    For Problems after Leaving the Hospital  Call [phone]  IN AN EMERGENCY CALL 911 OR GO TO THE NEAREST EMERGENCY ROOM    For The next 24 hours:  Do not: drive, work, or operate machinery  Do not: consume alcohol, tranquilizers, or sleeping medications  Do not: make important personal or business decisions    Normal post operative expectations:  A low grade fever      Caden Blonder coloration  of skin  from  soap    Sore  scratchy  throat    Minimal  amount  of swelling  or  bruising  Drainage  from operative  site    Other:                                                      Specific problems or warning signs to watch for:    Call [phone] if any of the following occur:  Persistent bleeding not stopped by direct pressure  Coughing or vomiting a large amount of bright red blood  Unable to tolerate liquids for more than 4 hours  Severe unexplained pain or burning  Temperature greater than 101 F  Unable to urinate for more than 6 hours  Other:     Medications:  A copy of your medication reconciliation form has been given to you  You have been given drug information sheets. Side effects, warnings and how to use these medications can be found on these sheets. XX Resume your usual medications      XX medications reconciled  XX Take prescriptions as directed             XX prescriptions reviewed  XX Norco 5/325 mg one-two tabs every 4-6 hours as needed for pain  XX Take Pain medication with food   Other:             [address] [facility]  Activity:  Restrict your activities for the rest of the day. Resume light activity tomorrow as you feel you can unless otherwise instructed. No driving for 48 hours. You are advised to go directly home from the hospital.  Do not engage in any strenuous activity that may put stress on your incision. No intercourse, douching, tampons, baths, lakes, or pools. No lifting or excessive bending. Instructed on deep breathing exercises. Diet:  XX Start with clear liquids and progress gradually to your usual diet if you are not  nauseated. Care of your operative area:  XX Keep dressing and operative area dry and clean. XX Apply ice for 20 minutes 4-6 times a day for two days. XX Remove your dressing in 48 hours. XX You may shower in 1 days. Do not soak in a bath or swim until cleared to do so by your physician.   Special instructions: Other:     Call [phone] to schedule your follow-up appointment:  Rosa Tomlin should return to the office in  14  days     Discharge instructions reviewed with patient / family member and all persons verbalize understanding of the instructions.

## 2022-11-17 NOTE — ANESTHESIA PRE PROCEDURE
Department of Anesthesiology  Preprocedure Note       Name:  Hoa Coleman   Age:  33 y.o.  :  1990                                          MRN:  [id number]         Date:  2022      Surgeon: Niraj Chowdary):  Purnima Sarah DO    Procedure: Procedure(s):  OPERATIVE LAPARASCOPY  WITH FULGURATION OF IMPLANTS AND LYSIS OF ADHESIONS  AND CHROMOTUBATION    Medications prior to admission:   Prior to Admission medications    Medication Sig Start Date End Date Taking?  Authorizing Provider   hydrOXYzine HCl (ATARAX) 50 MG tablet TAKE 1 TABLET BY MOUTH EVERY SIX HOURS AS NEEDED FOR ANXIETY  Patient not taking: Reported on 11/3/2022 8/12/22   Historical Provider, MD   acetaminophen-codeine (TYLENOL #3) 300-30 MG per tablet TAKE 1 TABLET BY MOUTH EVERY FOUR TO SIX HOURS 3/22/22   Historical Provider, MD   NP THYROID 90 MG tablet TAKE 1 TABLET BY MOUTH EVERY DAY 21   Historical Provider, MD   vitamin D (CHOLECALCIFEROL) 125 MCG (5000 UT) CAPS capsule Take 5,000 Units by mouth daily    Historical Provider, MD   clonazePAM (KLONOPIN) 0.5 MG tablet TK 1/2 TO 1 T PO BID 16   Historical Provider, MD   MAGNESIUM PO Take 250 mg by mouth daily     Historical Provider, MD       Current medications:    Current Facility-Administered Medications   Medication Dose Route Frequency Provider Last Rate Last Admin    sodium chloride flush 0.9 % injection 5-40 mL  5-40 mL IntraVENous 2 times per day Verito Crocker MD        sodium chloride flush 0.9 % injection 5-40 mL  5-40 mL IntraVENous PRN Vinh Dillon MD        0.9 % sodium chloride infusion   IntraVENous PRN Verito Crocker MD        lactated ringers infusion   IntraVENous Continuous Vinh Cortez MD        lidocaine PF 1 % injection 1 mL  1 mL IntraDERmal Once PRN Verito Crocker MD        scopolamine (TRANSDERM-SCOP) transdermal patch 1 patch  1 patch TransDERmal Once Kd Castellano MD   1 patch at 22 1025    lactated ringers infusion   IntraVENous Continuous Mack Plana,  mL/hr at 11/17/22 1035 New Bag at 11/17/22 1035    sodium chloride flush 0.9 % injection 5-40 mL  5-40 mL IntraVENous 2 times per day Mack Plana, DO        sodium chloride flush 0.9 % injection 5-40 mL  5-40 mL IntraVENous PRN Mack Plana, DO        0.9 % sodium chloride infusion   IntraVENous PRN Mack Plana, DO           Allergies: Allergies   Allergen Reactions    Ibuprofen Other (See Comments)     GI PAIN AND BLOATING         Problem List:    Patient Active Problem List   Diagnosis Code    Anxiety F41.9    Acne vulgaris L70.0    Menorrhagia with regular cycle N92.0    Endometriosis Stage 3 by photos N80.9    Hypothyroidism E03.8    Depression F32. A    Subserous leiomyoma of uterus-Pedunculated posteriorly D25.2    Atypical squamous cells of undetermined significance on cytologic smear of cervix (ASC-US) + HRHPV Other R87.610       Past Medical History:        Diagnosis Date    Anxiety 2012    ON RX    Endometriosis     Hyperlipidemia     BORDERLINE NO MEDS    Kidney stones 2011-2016    X 6-PASSED ALL ON OWN    Thyroid disease     hypothyroid    URI (upper respiratory infection) 10/2016    WAS ON ATB, INHALER ORAL COUGH MED, RESOLVED NOW       Past Surgical History:        Procedure Laterality Date    ABDOMINAL EXPLORATION SURGERY  11/15/2016    D&C, chromotubation, fulgration    OVARY SURGERY Right 11/15/2016    biposy of cyst       Social History:    Social History     Tobacco Use    Smoking status: Every Day     Packs/day: 0.25     Types: Cigarettes    Smokeless tobacco: Never   Substance Use Topics    Alcohol use: Yes     Comment: very rare                                Ready to quit: Not Answered  Counseling given: Not Answered      Vital Signs (Current):   Vitals:    11/17/22 1013   BP: 115/76   Pulse: 78   Resp: 14   Temp: 97.7 °F (36.5 °C)   TempSrc: Infrared   SpO2: 97%   Weight: 165 lb (74.8 kg)   Height: 5' 2\" (1.575 m) BP Readings from Last 3 Encounters:   11/17/22 115/76   11/03/22 112/63   11/01/22 114/70       NPO Status: Time of last liquid consumption: 2359                        Time of last solid consumption: 2359                        Date of last liquid consumption: 11/16/22                        Date of last solid food consumption: 11/16/22    BMI:   Wt Readings from Last 3 Encounters:   11/17/22 165 lb (74.8 kg)   11/03/22 165 lb (74.8 kg)   11/01/22 164 lb (74.4 kg)     Body mass index is 30.18 kg/m². CBC:   Lab Results   Component Value Date/Time    WBC 8.4 11/03/2022 09:45 AM    RBC 4.42 11/03/2022 09:45 AM    HGB 14.8 11/03/2022 09:45 AM    HCT 42.0 11/03/2022 09:45 AM    MCV 95.0 11/03/2022 09:45 AM    RDW 13.1 11/03/2022 09:45 AM     11/03/2022 09:45 AM       CMP:   Lab Results   Component Value Date/Time     11/03/2022 09:45 AM    K 4.1 11/03/2022 09:45 AM     11/03/2022 09:45 AM    CO2 25 11/03/2022 09:45 AM    BUN 10 11/03/2022 09:45 AM    CREATININE 0.75 11/03/2022 09:45 AM    GFRAA >60 08/15/2022 10:23 PM    LABGLOM >60 11/03/2022 09:45 AM    GLUCOSE 110 11/03/2022 09:45 AM    PROT 7.5 09/28/2016 02:10 PM    CALCIUM 10.0 11/03/2022 09:45 AM    BILITOT 0.22 09/28/2016 02:10 PM    ALKPHOS 97 09/28/2016 02:10 PM    AST 14 09/28/2016 02:10 PM    ALT 16 09/28/2016 02:10 PM       POC Tests: No results for input(s): POCGLU, POCNA, POCK, POCCL, POCBUN, POCHEMO, POCHCT in the last 72 hours.     Coags: No results found for: PROTIME, INR, APTT    HCG (If Applicable):   Lab Results   Component Value Date    PREGTESTUR negative 08/11/2022    HCG NEGATIVE 03/03/2017    HCGQUANT <1 11/03/2022        ABGs: No results found for: PHART, PO2ART, QLD1FLO, AWO9BJE, BEART, H0AVVFAG     Type & Screen (If Applicable):  No results found for: LABABO, LABRH    Drug/Infectious Status (If Applicable):  No results found for: HIV, HEPCAB    COVID-19 Screening (If Applicable): No results found for: COVID19        Anesthesia Evaluation  Patient summary reviewed and Nursing notes reviewed no history of anesthetic complications:   Airway: Mallampati: III  TM distance: >3 FB   Neck ROM: full  Mouth opening: > = 3 FB   Dental: normal exam         Pulmonary:normal exam  breath sounds clear to auscultation  (+) current smoker          Patient did not smoke on day of surgery. Cardiovascular:Negative CV ROS  Exercise tolerance: good (>4 METS),           Rhythm: regular  Rate: normal                    Neuro/Psych:   (+) psychiatric history: stable with treatment            GI/Hepatic/Renal: Neg GI/Hepatic/Renal ROS            Endo/Other:    (+) hypothyroidism::., .                 Abdominal:             Vascular: Other Findings:           Anesthesia Plan      general     ASA 2       Induction: intravenous. MIPS: Postoperative opioids intended and Prophylactic antiemetics administered. Anesthetic plan and risks discussed with patient. Plan discussed with CRNA.                     John Gorman MD   11/17/2022

## 2022-11-17 NOTE — OP NOTE
Gynecologic Operative Note      NAME: Urvashi Rivera   MRN: [id number]  CSN: [id number]  : 1990    PROCEDURE DATE: 2022     Pre-op Diagnosis: Endometriosis stage 3; Pelvic Pain; Family Planning Fibroid     Post-op Diagnosis: Same; 4-5 cm pedunculated posterior fibroid    Procedure: Procedure(s):  OPERATIVE LAPARASCOPY  WITH FULGURATION OF IMPLANTS   AND CHROMOTUBATION    Surgeon: Sherry Khan DO    Assistant:   1. Marysol Boyce DO (NO RESIDENTS TODAY DUE TO INTERVIEWS)      Circulator Documentation of Staff:  Surgeon(s) and Role:     * Sherry Khan DO - Primary     * Vilma Marquez DO - Assisting    OR Staff:  Scrub Person First: Moe Viveros      Anesthesia Type: General  Anesthesia Staff: Anesthesiologist: Bisi Anand MD  CRNA: BRUCE Azevedo CRNA      Complications: None      Estimated Blood Loss: 5 ml  Total IV Fluids:  600 ml  Urine Output: 300 ml clear      Specimens: * No specimens in log *  Implants: * No implants in log *    Drains:   Urinary Catheter [address] (Active)         Condition: Stable    Findings: AV uterus without adnexal fullness BL. Smooth Bladder No masses. Fullness posteriorly in cul-de-sac. Laparoscopically Endometriosis located in the left cul-de-sac and right uterosacral . These were vesicular and hemosiderin. Small Richi-Master defect of the left ovarian fossa. Bilateral ovaries were free. Large pedunculated posterior Myoma ~4-5 cm. The bilateral Fallopian tubes were patent with +Spill. Description of Procedure: (Understanding of limitations from template op-reports exist)  The patient was seen in the pre-operative area. The procedure risk and complications were reviewed. The consent , labs , and H&P were reviewed. The patient had all of her questions answered. The patient was moved to the operative suite where she was placed under general anesthesia by the anesthesiologist.  Time out was preformed.  The patient was placed in the dorsal lithotomy position utilizing Yellow fin Stirrups. The Positioning was checked without stress or pressure on any joints. She was prepped and draped in the normal sterile fashion. A haque catheter was placed. A bimanual exam was completed, see findings. A weighted speculum was placed and the cervix was grasped with a Ant's Tenaculum. The uterus was sounded to 7 cm and dilated to a #5 hegar with ease. The Katy manipulator was set off the field and placed. The balloon was inflated with 5 ml. The weighted speculum was removed and a Raytec was placed in the vagina to prent any lacerations. Gloves were then changed and attention was turned to the abdomen. An elliptical infraumbilical skin incision was made. The tissue was dissected to the level of the fascia. Using \"S\" retractors the fascia was visualized. It was then grasped with kocher's x 2. The fascia was tented and then incised and tagged with two \"O\" vicryl lateral STAY sutures. Digital dissection was utilized to enter the peritoneal cavity, the \"S\" retractors were re-approximated. Utilizing a blunt tipped Connell trochar #10, this was introduced and secured to the STAY sutures. CO2 gas was used to create a pneumoperitoneum to 12 mm hg. Trendelenburg was produced. The #30 laparoscope was placed and underlying structures were noted to be without trauma. A second port was placed two fingersbreadth above the pubic bone in the midline with transillumination under direct laparoscopic guidance. A 5 mm blade less trocar was utilized. It was placed without difficulty or trauma to any structures. It was hemostatic. Visualization of the pelvis and upper abdomen was completed, Intra-operative video was taken. Findings are noted above. The Ligasure was introduced through the lower port and fulguration of the endometriosis in the cul-de-sac and uterosacral was completed.  Diluted Methylene blue was then flushed through the Katy and spillage bilaterally was confirmed. All instruments and excess gas was removed from the abdomen and the trocarS were removed without difficulty. Incisions were further injected with 0.5 % plain marcaine for further analgesia. A single finger sweep noted no omentum or small bowel in the infraumbilical site. The fascia was  then closed by cross tying the lateral STAY sutures. The fascial plane was then checked and had no defects. The skin was closed with 4-0 vicryl in a subcuticular fashion and dressed with steri-strips and TinCoBen and a sterile dressing x 2. The Katy balloon was decompressed and the instrument and raytec were removed. The anterior cervical lip was hemostatic from the Ant's Tenaculum after removal.  The patient tolerated the procedure well and was taken to PACU in stable condition. Sponge, needle and instrument counts were called for and found to be correct x 3..        Disposition:  The patient will return to my office in 2 weeks. We will review her pathology report and her intra-operative video. Recommendations for Open Laparotomy via pfannenstiel for removal of the Myoma before attempting pregnancy will be discussed vs MIS approach. The pt recently completed her 6 months of Lupron. She was counseled with her family that she is to report any temperature more than 100.4 F, pelvic pain, or heavy vaginal bleeding; She is to refrain from intercourse douching or tampons; No hot tubs baths lakes or pools. The patient voiced understanding of the above counseling.       Electronically signed by Jessica Graff DO on 11/17/22 at 12:08 PM EST

## 2022-11-17 NOTE — H&P
Gynecologic Surgery Pre-Operative Attestation Note:        Facility: [facility]  Date: 2022  Time: 10:51 AM      Patient Name: Evelyn Monson  Patient : 1990  Room/Bed: [facility] OR Pool/NONE  Admission Date/Time: 2022  9:02 AM  MRN #: [id number]  [facility] #: [id number]            Attending Physician Statement  I have discussed and reviewed the care of Evelyn Monson, including pertinent history and exam findings. I have reviewed the note in the electronic medical record. I have seen and examined the patient in the pre-op holding area and the key elements of all parts of the encounter have been performed/reviewed by me . This was completed more than 15 minutes prior to the scheduled surgical start time per the new start time policy. I agree with the assessment, plan and orders as documented. The procedure risks and complications were discussed as well as the possibility of the need for a second surgery and incomplete removal of abnormal tissue. The patient voiced understanding. The consents were identified on the chart and reviewed with the patient with possible risks of the procedure. Vitals:    22 1013   BP: 115/76   Pulse: 78   Resp: 14   Temp: 97.7 °F (36.5 °C)   TempSrc: Infrared   SpO2: 97%   Weight: 165 lb (74.8 kg)   Height: 5' 2\" (1.575 m)         Hospital Outpatient Visit on 2022   Component Date Value Ref Range Status    Specimen Description 2022 . CLEAN CATCH URINE   Final    Culture 2022 NO GROWTH   Final    Color, UA 2022 Yellow  Yellow Final    Turbidity UA 2022 Clear  Clear Final    Glucose, Ur 2022 NEGATIVE  NEGATIVE Final    Bilirubin Urine 2022 NEGATIVE  NEGATIVE Final    Ketones, Urine 2022 NEGATIVE  NEGATIVE Final    Specific Gravity, UA 2022 1.006  1.000 - 1.030 Final    Urine Hgb 2022 MOD (A)  NEGATIVE Final    pH, UA 2022 7.5  5.0 - 8.0 Final    Protein, UA 2022 NEGATIVE  NEGATIVE Final    Urobilinogen, Urine 11/03/2022 Normal  Normal Final    Nitrite, Urine 11/03/2022 NEGATIVE  NEGATIVE Final    Leukocyte Esterase, Urine 11/03/2022 NEGATIVE  NEGATIVE Final    Expiration Date 11/03/2022 11/20/2022,2359   Final    Arm Band Number 11/03/2022 [id number]   Final    ABO/Rh 11/03/2022 A POSITIVE   Final    Antibody Screen 11/03/2022 NEGATIVE   Final    hCG Quant 11/03/2022 <1  <5 mIU/mL Final    Comment:    Non-preg premeno   <=5  Postmeno           <=8  Male               <=3  If HCG results do not concur with clinical observations, additional testing to confirm   results is recommended. WBC 11/03/2022 8.4  3.5 - 11.0 k/uL Final    RBC 11/03/2022 4.42  4.0 - 5.2 m/uL Final    Hemoglobin 11/03/2022 14.8  12.0 - 16.0 g/dL Final    Hematocrit 11/03/2022 42.0  36 - 46 % Final    MCV 11/03/2022 95.0  80 - 100 fL Final    MCH 11/03/2022 33.4  26 - 34 pg Final    MCHC 11/03/2022 35.1  31 - 37 g/dL Final    RDW 11/03/2022 13.1  11.5 - 14.9 % Final    Platelets 97/43/0111 316  150 - 450 k/uL Final    MPV 11/03/2022 7.7  6.0 - 12.0 fL Final    Seg Neutrophils 11/03/2022 59  36 - 66 % Final    Lymphocytes 11/03/2022 32  24 - 44 % Final    Monocytes 11/03/2022 4  1 - 7 % Final    Eosinophils % 11/03/2022 4  0 - 4 % Final    Basophils 11/03/2022 1  0 - 2 % Final    Segs Absolute 11/03/2022 5.00  1.3 - 9.1 k/uL Final    Absolute Lymph # 11/03/2022 2.70  1.0 - 4.8 k/uL Final    Absolute Mono # 11/03/2022 0.30  0.1 - 1.3 k/uL Final    Absolute Eos # 11/03/2022 0.30  0.0 - 0.4 k/uL Final    Basophils Absolute 11/03/2022 0.00  0.0 - 0.2 k/uL Final    Glucose 11/03/2022 110 (A)  70 - 99 mg/dL Final    BUN 11/03/2022 10  6 - 20 mg/dL Final    Creatinine 11/03/2022 0.75  0.50 - 0.90 mg/dL Final    Est, Glom Filt Rate 11/03/2022 >60  >60 mL/min/1.73m2 Final    Comment:       Effective Oct 3, 2022        These results are not intended for use in patients <25years of age.         eGFR results are calculated without a race factor using the 2021 CKD-EPI equation. Careful clinical correlation is recommended, particularly when comparing to results   calculated using previous equations. The CKD-EPI equation is less accurate in patients with extremes of muscle mass, extra-renal   metabolism of creatine, excessive creatine ingestion, or following therapy that affects   renal tubular secretion. Calcium 11/03/2022 10.0  8.6 - 10.4 mg/dL Final    Sodium 11/03/2022 138  135 - 144 mmol/L Final    Potassium 11/03/2022 4.1  3.7 - 5.3 mmol/L Final    Chloride 11/03/2022 102  98 - 107 mmol/L Final    CO2 11/03/2022 25  20 - 31 mmol/L Final    Anion Gap 11/03/2022 11  9 - 17 mmol/L Final    WBC, UA 11/03/2022 0 TO 2  /HPF Final    RBC, UA 11/03/2022 0 TO 2  /HPF Final    Casts UA 11/03/2022 0 TO 2  /LPF Final    Epithelial Cells UA 11/03/2022 0 TO 2  /HPF Final    Bacteria, UA 11/03/2022 None  None Final   ]      The patient was counseled at length about the risks of avtar Covid-19 in the niecy-operative and post-operative states including the recovery window of their procedure. The patient was made aware that avtar Covid-19 after a surgical procedure may worsen their prognosis for recovering from the virus and lend to a higher morbidity and or mortality risk. The patient was given the options of postponing their procedure. All of the risks, benefits, and alternatives were discussed. The patient  does wish to proceed with the procedure. Assessment:       Diagnosis Orders   1. Pelvic pain          2. Endometriosis          3. Hypothyroidism          4. Family planning          5.  Subserous leiomyoma of uterus-Pedunculated posteriorly                      Patient Active Problem List     Diagnosis Date Noted    Atypical squamous cells of undetermined significance on cytologic smear of cervix (ASC-US) + HRHPV Other 07/13/2021       Priority: High       Overview Note:       5/5/2022 Colposcopy with ECC completed  5/2023 PAP (if colposcopy in 2022 is negative)       Subserous leiomyoma of uterus-Pedunculated posteriorly 02/05/2021       Priority: High    Endometriosis Stage 3 by photos 12/30/2020       Priority: High       Overview Note:       Pt was in test pool for [place] with Success       Menorrhagia with regular cycle 09/28/2016       Priority: High    Hypothyroidism 12/30/2020       Priority: Medium    Depression 12/30/2020       Priority: Medium       Overview Note:       On Clonopin       Acne vulgaris 09/28/2016    Anxiety              Permanent Sterilization Completed: No      Plan:  Operative Laparoscopy with FOI/KASSANDRA as pt is on Lupron with good response. Chromopertubation  Possible Myomectomy of pedunculated fibroid  No orders of the defined types were placed in this encounter. Counseling: The patient was counseled on all options both medical and surgical, conservative as well as definitive. She has elected to proceed with the procedure as stated above. The patient was counseled on the procedure. Risks and complications were reviewed in detail. The patients orders, labs, consents have been completed. The history and physical as well as all supporting surgical documentation will be forwarded to the pre-operative holding area. The patient is aware that this procedure may not alleviate her symptoms. That there may be a necessity for a second surgery and that there may be an incomplete removal of abnormal tissue. Home care, Restrictions & Follow up Care review completed    Planned Office Follow up was reviewed with the patient and her family and is for  2 weeks. The patient will call to make this appointment unless she already has done so.

## 2022-11-17 NOTE — DISCHARGE INSTR - OTHER ORDERS
Operative Laparoscopy with Fulguration of endometriosis  Chromopertubation  Identification of 4-5 cm pedunculated posterior myoma

## 2022-12-06 ENCOUNTER — OFFICE VISIT (OUTPATIENT)
Dept: OBGYN CLINIC | Age: 32
End: 2022-12-06

## 2022-12-06 VITALS
BODY MASS INDEX: 29.63 KG/M2 | HEIGHT: 62 IN | WEIGHT: 161 LBS | SYSTOLIC BLOOD PRESSURE: 108 MMHG | DIASTOLIC BLOOD PRESSURE: 66 MMHG

## 2022-12-06 DIAGNOSIS — R10.2 PELVIC PAIN: Primary | ICD-10-CM

## 2022-12-06 DIAGNOSIS — Z98.890 H/O LAPAROSCOPY: ICD-10-CM

## 2022-12-06 DIAGNOSIS — D25.2 SUBSEROUS LEIOMYOMA OF UTERUS: ICD-10-CM

## 2022-12-06 DIAGNOSIS — N80.9 ENDOMETRIOSIS: ICD-10-CM

## 2022-12-06 PROCEDURE — 99024 POSTOP FOLLOW-UP VISIT: CPT | Performed by: OBSTETRICS & GYNECOLOGY

## 2022-12-06 NOTE — PROGRESS NOTES
Rahel Pittman  2022  12:25 PM      Rahel Pittman  Procedure:   PROCEDURE DATE: 2022      Pre-op Diagnosis: Endometriosis stage 3; Pelvic Pain; Family Planning Fibroid     Post-op Diagnosis: Same; 4-5 cm pedunculated posterior fibroid     Procedure: Procedure(s):  OPERATIVE LAPARASCOPY  WITH FULGURATION OF IMPLANTS   AND CHROMOTUBATION     Surgeon: Sherry Khan DO     Assistant:   1. Marysol Boyce DO (NO RESIDENTS TODAY DUE TO INTERVIEWS)    Findings:  AV uterus without adnexal fullness BL. Smooth Bladder No masses. Fullness posteriorly in cul-de-sac. Laparoscopically Endometriosis located in the left cul-de-sac and right uterosacral . These were vesicular and hemosiderin. Small Richi-Master defect of the left ovarian fossa. Bilateral ovaries were free. Large pedunculated posterior Myoma ~4-5 cm. The bilateral Fallopian tubes were patent with +Spill. Urvashi Rivera is a 33 y.o. female       The patient was seen, she denies any complaints. She denied any shortness of breath, chest pain or dizziness. She denied any nausea, vomiting, or diarrhea. There is no fever, chills, or rigors. The patient denies any vaginal bleeding, discharge or odor. All of her pre-operative complaints are now resolved. Blood pressure 108/66, height 5' 2\" (1.575 m), weight 161 lb (73 kg), not currently breastfeeding. Abdominal Exam: soft non-tender. Good bowel sounds. No guarding, rebound or rigidity. No costal vertebral angle tenderness bilateral. No hernias    Incision: healing well, no drainage, no erythema, no hernia, no seroma, no swelling, well approximated, x 2    Extremities: No edema or calf pain noted bilaterally. Pelvic Exam: Declined      Results for orders placed or performed during the hospital encounter of 22   POCT HCG, Prenancy, Ur   Result Value Ref Range    HCG, Pregnancy Urine (POC) NEGATIVE NEGATIVE           Assessment:      Diagnosis Orders   1. Pelvic pain        2. Endometriosis        3. Subserous leiomyoma of uterus-Pedunculated posteriorly        4. Laparoscopy Operative with FOI of Stage 3 endometriosis and Chromopertubation-Patent 11/17/2022             Patient Active Problem List    Diagnosis Date Noted    Pelvic pain 11/17/2022     Priority: High    Laparoscopy Operative with FOI of Stage 3 endometriosis and Chromopertubation-Patent 11/17/2022 11/17/2022     Priority: High    Atypical squamous cells of undetermined significance on cytologic smear of cervix (ASC-US) + HRHPV Other 07/13/2021     Priority: High     5/5/2022 Colposcopy with ECC completed  5/2023 PAP (if colposcopy in 2022 is negative)      Subserous leiomyoma of uterus-Pedunculated posteriorly 02/05/2021     Priority: High    Endometriosis Stage 3 by photos 12/30/2020     Priority: High     Pt was in test pool for [place] with Success      Menorrhagia with regular cycle 09/28/2016     Priority: High    Hypothyroidism 12/30/2020     Priority: Medium    Depression 12/30/2020     Priority: Medium     On Clonopin      Acne vulgaris 09/28/2016    Family planning 09/28/2016     Updating deleted diagnoses      Anxiety           POD# 2-3 weeks   Procedure: see above   Stable      Post-op Recommendations:  Open Laparotomy via pfannenstiel for removal of the Myoma before attempting pregnancy will be discussed vs MIS approach. The pt recently completed her 6 months of Lupron. Plan:   Return in about 6 weeks (around 1/17/2023) for Surgical Boarding. Continue with restrictions. Pelvic rest. No lifting or intercourse. No baths or pools. No douching or tampons.    Return to office 2-3 weeks   Pt wishes to proceed with a Mini-laparotomy with Myomectomy and        FOI-Declined MIS approach

## 2022-12-16 ENCOUNTER — HOSPITAL ENCOUNTER (EMERGENCY)
Age: 32
Discharge: HOME | End: 2022-12-16
Attending: STUDENT IN AN ORGANIZED HEALTH CARE EDUCATION/TRAINING PROGRAM
Payer: COMMERCIAL

## 2022-12-16 VITALS
TEMPERATURE: 98.2 F | WEIGHT: 168 LBS | RESPIRATION RATE: 18 BRPM | BODY MASS INDEX: 30.73 KG/M2 | DIASTOLIC BLOOD PRESSURE: 81 MMHG | HEART RATE: 91 BPM | OXYGEN SATURATION: 97 % | SYSTOLIC BLOOD PRESSURE: 132 MMHG

## 2022-12-16 DIAGNOSIS — M62.838 SPASM OF MUSCLE: Primary | ICD-10-CM

## 2022-12-16 DIAGNOSIS — S39.012A STRAIN OF LUMBAR REGION, INITIAL ENCOUNTER: ICD-10-CM

## 2022-12-16 PROCEDURE — 96372 THER/PROPH/DIAG INJ SC/IM: CPT

## 2022-12-16 PROCEDURE — 6370000000 HC RX 637 (ALT 250 FOR IP): Performed by: STUDENT IN AN ORGANIZED HEALTH CARE EDUCATION/TRAINING PROGRAM

## 2022-12-16 PROCEDURE — 6360000002 HC RX W HCPCS: Performed by: STUDENT IN AN ORGANIZED HEALTH CARE EDUCATION/TRAINING PROGRAM

## 2022-12-16 PROCEDURE — 99284 EMERGENCY DEPT VISIT MOD MDM: CPT

## 2022-12-16 RX ORDER — ACETAMINOPHEN 500 MG
1000 TABLET ORAL EVERY 8 HOURS
Qty: 60 TABLET | Refills: 1 | Status: SHIPPED | OUTPATIENT
Start: 2022-12-16

## 2022-12-16 RX ORDER — METHOCARBAMOL 500 MG/1
1000 TABLET, FILM COATED ORAL 3 TIMES DAILY
Qty: 18 TABLET | Refills: 0 | Status: SHIPPED | OUTPATIENT
Start: 2022-12-16 | End: 2022-12-19

## 2022-12-16 RX ORDER — LIDOCAINE 50 MG/G
1 PATCH TOPICAL DAILY
Qty: 10 PATCH | Refills: 0 | Status: SHIPPED | OUTPATIENT
Start: 2022-12-16 | End: 2022-12-26

## 2022-12-16 RX ORDER — LIDOCAINE 4 G/G
1 PATCH TOPICAL ONCE
Status: DISCONTINUED | OUTPATIENT
Start: 2022-12-16 | End: 2022-12-16 | Stop reason: HOSPADM

## 2022-12-16 RX ORDER — METHOCARBAMOL 750 MG/1
1500 TABLET, FILM COATED ORAL ONCE
Status: COMPLETED | OUTPATIENT
Start: 2022-12-16 | End: 2022-12-16

## 2022-12-16 RX ORDER — OXYCODONE HYDROCHLORIDE 5 MG/1
5 TABLET ORAL ONCE
Status: COMPLETED | OUTPATIENT
Start: 2022-12-16 | End: 2022-12-16

## 2022-12-16 RX ORDER — KETOROLAC TROMETHAMINE 30 MG/ML
30 INJECTION, SOLUTION INTRAMUSCULAR; INTRAVENOUS ONCE
Status: COMPLETED | OUTPATIENT
Start: 2022-12-16 | End: 2022-12-16

## 2022-12-16 RX ADMIN — KETOROLAC TROMETHAMINE 30 MG: 30 INJECTION, SOLUTION INTRAMUSCULAR; INTRAVENOUS at 02:52

## 2022-12-16 RX ADMIN — METHOCARBAMOL 1500 MG: 750 TABLET ORAL at 02:50

## 2022-12-16 RX ADMIN — OXYCODONE HYDROCHLORIDE 5 MG: 5 TABLET ORAL at 02:51

## 2022-12-16 ASSESSMENT — PAIN DESCRIPTION - LOCATION
LOCATION: BACK
LOCATION: BACK

## 2022-12-16 ASSESSMENT — ENCOUNTER SYMPTOMS
SORE THROAT: 0
SHORTNESS OF BREATH: 0
NAUSEA: 0
DIARRHEA: 0
VOMITING: 0
ABDOMINAL PAIN: 0
RHINORRHEA: 0
BACK PAIN: 1
EYE DISCHARGE: 0
EYE REDNESS: 0

## 2022-12-16 ASSESSMENT — PAIN SCALES - GENERAL
PAINLEVEL_OUTOF10: 4
PAINLEVEL_OUTOF10: 10
PAINLEVEL_OUTOF10: 10
PAINLEVEL_OUTOF10: 4

## 2022-12-16 ASSESSMENT — LIFESTYLE VARIABLES
HOW OFTEN DO YOU HAVE A DRINK CONTAINING ALCOHOL: NEVER
HOW MANY STANDARD DRINKS CONTAINING ALCOHOL DO YOU HAVE ON A TYPICAL DAY: PATIENT DOES NOT DRINK

## 2022-12-16 ASSESSMENT — PAIN - FUNCTIONAL ASSESSMENT: PAIN_FUNCTIONAL_ASSESSMENT: 0-10

## 2022-12-16 NOTE — ED PROVIDER NOTES
EMERGENCY DEPARTMENT ENCOUNTER    Pt Name: Marizol Cedeno  MRN: [id number]  [place] 1990  Date of evaluation: 12/16/22  CHIEF COMPLAINT       Chief Complaint   Patient presents with    Back Pain     Lower back pain  since   2 days      HISTORY OF PRESENT ILLNESS   33year-old has some issues with chronic back pain presenting with back pain    Stood up getting out of the bathroom and follow-up pulling sensation in her lower back    Left-sided. Aching. No numbness tingling weakness. No bowel or bladder incontinence. No dysuria hematuria frequency. No fevers or chills. No IV drug abuse. No trauma            REVIEW OF SYSTEMS     Review of Systems   Constitutional:  Negative for chills and fever. HENT:  Negative for rhinorrhea and sore throat. Eyes:  Negative for discharge and redness. Respiratory:  Negative for shortness of breath. Cardiovascular:  Negative for chest pain. Gastrointestinal:  Negative for abdominal pain, diarrhea, nausea and vomiting. Genitourinary:  Negative for dysuria, frequency and urgency. Musculoskeletal:  Positive for back pain. Negative for arthralgias and myalgias. Skin:  Negative for rash. Neurological:  Negative for weakness and numbness. Psychiatric/Behavioral:  Negative for suicidal ideas. All other systems reviewed and are negative. PASTMEDICAL HISTORY     Past Medical History:   Diagnosis Date    Anxiety 2012    ON RX    Endometriosis     Hyperlipidemia     BORDERLINE NO MEDS    Kidney stones 2011-2016    X 6-PASSED ALL ON OWN    Thyroid disease     hypothyroid    URI (upper respiratory infection) 10/2016    WAS ON ATB, INHALER ORAL COUGH MED, RESOLVED NOW     Past Problem List  Patient Active Problem List   Diagnosis Code    Anxiety F41.9    Acne vulgaris L70.0    Family planning Z30.09    Menorrhagia with regular cycle N92.0    Endometriosis Stage 3 by photos N80.9    Hypothyroidism E03.8    Depression F32. A    Subserous leiomyoma of uterus-Pedunculated posteriorly D25.2    Atypical squamous cells of undetermined significance on cytologic smear of cervix (ASC-US) + HRHPV Other R87.610    Pelvic pain R10.2    Laparoscopy Operative with FOI of Stage 3 endometriosis and Chromopertubation-Patent 2022 Z98.890     SURGICAL HISTORY       Past Surgical History:   Procedure Laterality Date    ABDOMINAL EXPLORATION SURGERY  11/15/2016    D&C, chromotubation, fulgration    LAPAROSCOPY N/A 2022    OPERATIVE LAPARASCOPY  WITH FULGURATION OF IMPLANTS   AND CHROMOTUBATION performed by Kirtland Schirmer, DO at [address] [facility] Right 11/15/2016    biposy of cyst     CURRENT MEDICATIONS       Previous Medications    ACETAMINOPHEN-CODEINE (TYLENOL #3) 300-30 MG PER TABLET    TAKE 1 TABLET BY MOUTH EVERY FOUR TO SIX HOURS    CLONAZEPAM (KLONOPIN) 0.5 MG TABLET    TK 1/2 TO 1 T PO BID    MAGNESIUM PO    Take 250 mg by mouth daily     NP THYROID 90 MG TABLET    TAKE 1 TABLET BY MOUTH EVERY DAY    VITAMIN D (CHOLECALCIFEROL) 125 MCG (5000 UT) CAPS CAPSULE    Take 5,000 Units by mouth daily     ALLERGIES     is allergic to ibuprofen. FAMILY HISTORY     She indicated that her mother is alive. She indicated that her father is alive. She indicated that both of her sisters are alive. She indicated that her brother is alive. She indicated that her maternal grandmother is . She indicated that her maternal grandfather is . She indicated that her paternal grandmother is alive. She indicated that her paternal grandfather is alive.      SOCIAL HISTORY       Social History     Tobacco Use    Smoking status: Every Day     Packs/day: 0.25     Types: Cigarettes    Smokeless tobacco: Never   Vaping Use    Vaping Use: Never used   Substance Use Topics    Alcohol use: Yes     Comment: very rare    Drug use: No     PHYSICAL EXAM     INITIAL VITALS: /81   Pulse 91   Temp 98.2 °F (36.8 °C) (Oral)   Resp 18   Wt 168 lb (76.2 kg)   SpO2 97% BMI 30.73 kg/m²    Physical Exam  Vitals and nursing note reviewed. Constitutional:       Appearance: Normal appearance. HENT:      Head: Normocephalic and atraumatic. Nose: Nose normal.      Mouth/Throat:      Mouth: Mucous membranes are moist.   Eyes:      Conjunctiva/sclera: Conjunctivae normal.      Pupils: Pupils are equal, round, and reactive to light. Cardiovascular:      Rate and Rhythm: Normal rate and regular rhythm. Pulses: Normal pulses. Heart sounds: Normal heart sounds. Pulmonary:      Effort: Pulmonary effort is normal.      Breath sounds: Normal breath sounds. Abdominal:      Palpations: Abdomen is soft. Tenderness: There is no abdominal tenderness. Musculoskeletal:         General: No swelling or deformity. Cervical back: Normal range of motion. Comments: Left lumbar paraspinal muscular spasm and tenderness    5 out of 5 strength bilateral lower extremity sensation intact normal pulses   Skin:     General: Skin is warm. Findings: No rash. Neurological:      General: No focal deficit present. Mental Status: She is alert and oriented to person, place, and time.    Psychiatric:         Mood and Affect: Mood normal.       MEDICAL DECISION MAKING / ED COURSE:   Summary of Patient Presentation:      70-year-old presents with back pain        1)  Number and Complexity of Problems  Problem List This Visit: Back pain    Differential Diagnosis: Lumbar strain, sciatica, muscle spasm    Diagnoses Considered but Do Not Suspect: Fracture, cord compression, epidural abscess    Pertinent Comorbid Conditions: Chronic issues the back pain      3)  Treatment and Disposition    Patient repeat assessment: Improving pain    Disposition discussion with patient/family: Discussed discharge with symptomatic therapy follow-up with the primary doctor return if any red flags such as numbness weakness bowel or bladder incontinence        \"ED Course\" Notes From [facility] Narrator:         CRITICAL CARE:       PROCEDURES:    Procedures      DATA FOR LAB AND RADIOLOGY TESTS ORDERED BELOW ARE REVIEWED BY THE ED CLINICIAN:    RADIOLOGY: All x-rays, CT, MRI, and formal ultrasound images (except ED bedside ultrasound) are read by the radiologist, see reports below, unless otherwise noted in MDM or here. Reports below are reviewed by myself. No orders to display       LABS: Lab orders shown below, the results are reviewed by myself, and all abnormals are listed below. Labs Reviewed - No data to display    Vitals Reviewed:    Vitals:    12/16/22 0237   BP: 132/81   Pulse: 91   Resp: 18   Temp: 98.2 °F (36.8 °C)   TempSrc: Oral   SpO2: 97%   Weight: 168 lb (76.2 kg)     MEDICATIONS GIVEN TO PATIENT THIS ENCOUNTER:  Orders Placed This Encounter   Medications    ketorolac (TORADOL) injection 30 mg    methocarbamol (ROBAXIN) tablet 1,500 mg    oxyCODONE (ROXICODONE) immediate release tablet 5 mg    lidocaine 4 % external patch 1 patch    acetaminophen (TYLENOL) 500 MG tablet     Sig: Take 2 tablets by mouth every 8 (eight) hours     Dispense:  60 tablet     Refill:  1    lidocaine (LIDODERM) 5 %     Sig: Place 1 patch onto the skin daily for 10 days 12 hours on, 12 hours off. Dispense:  10 patch     Refill:  0    methocarbamol (ROBAXIN) 500 MG tablet     Sig: Take 2 tablets by mouth 3 times daily for 3 days     Dispense:  18 tablet     Refill:  0     DISCHARGE PRESCRIPTIONS:  New Prescriptions    ACETAMINOPHEN (TYLENOL) 500 MG TABLET    Take 2 tablets by mouth every 8 (eight) hours    LIDOCAINE (LIDODERM) 5 %    Place 1 patch onto the skin daily for 10 days 12 hours on, 12 hours off. METHOCARBAMOL (ROBAXIN) 500 MG TABLET    Take 2 tablets by mouth 3 times daily for 3 days     PHYSICIAN CONSULTS ORDERED THIS ENCOUNTER:  None  FINAL IMPRESSION      1. Spasm of muscle    2.  Strain of lumbar region, initial encounter          DISPOSITION/PLAN   DISPOSITION Decision To Discharge 12/16/2022 83:20:51 AM      OUTPATIENT FOLLOW UP THE PATIENT:  BRUCE Estrada NP  [address] [facility]  [phone]    Schedule an appointment as soon as possible for a visit in 1 day      [facility] ED  [facility] [address]  [phone]    As needed    MD Felipa Leo Si, Si, MD  12/16/22 Bennett Caruso MD  12/16/22 [id number]

## 2022-12-16 NOTE — ED TRIAGE NOTES
Mode of arrival (squad #, walk in, police, etc) : walk in        Chief complaint(s): back pain    Arrival Note (brief scenario, treatment PTA, etc). : Patient states that she bend down  to dress herself and  after she started having pain in  lower back  since 2 days now, she is unable to sit and   sleep, have chronic back issues , describe shap pain , 10 /10 on pain  scale ,     C= \"Have you ever felt that you should Cut down on your drinking? \"  No  A= \"Have people Annoyed you by criticizing your drinking? \"  No  G= \"Have you ever felt bad or Guilty about your drinking? \"  No  E= \"Have you ever had a drink as an Eye-opener first thing in the morning to steady your nerves or to help a hangover? \"  No   Deerred []    Reason for deferring: N/A    *If yes to two or more: probable alcohol abuse. *

## 2023-02-27 ENCOUNTER — PREP FOR PROCEDURE (OUTPATIENT)
Dept: OBGYN CLINIC | Age: 33
End: 2023-02-27

## 2023-02-27 ENCOUNTER — OFFICE VISIT (OUTPATIENT)
Dept: OBGYN CLINIC | Age: 33
End: 2023-02-27
Payer: COMMERCIAL

## 2023-02-27 VITALS
DIASTOLIC BLOOD PRESSURE: 80 MMHG | SYSTOLIC BLOOD PRESSURE: 118 MMHG | WEIGHT: 161 LBS | HEIGHT: 62 IN | BODY MASS INDEX: 29.63 KG/M2

## 2023-02-27 DIAGNOSIS — Z98.890 H/O LAPAROSCOPY: ICD-10-CM

## 2023-02-27 DIAGNOSIS — N80.9 ENDOMETRIOSIS: ICD-10-CM

## 2023-02-27 DIAGNOSIS — D25.2 SUBSEROUS LEIOMYOMA OF UTERUS: Primary | ICD-10-CM

## 2023-02-27 DIAGNOSIS — Z01.818 PREOP TESTING: Primary | ICD-10-CM

## 2023-02-27 PROCEDURE — 99213 OFFICE O/P EST LOW 20 MIN: CPT | Performed by: OBSTETRICS & GYNECOLOGY

## 2023-02-27 RX ORDER — SODIUM CHLORIDE 9 MG/ML
INJECTION, SOLUTION INTRAVENOUS PRN
Status: CANCELLED | OUTPATIENT
Start: 2023-02-27

## 2023-02-27 RX ORDER — SODIUM CHLORIDE 0.9 % (FLUSH) 0.9 %
5-40 SYRINGE (ML) INJECTION PRN
Status: CANCELLED | OUTPATIENT
Start: 2023-02-27

## 2023-02-27 RX ORDER — SODIUM CHLORIDE 0.9 % (FLUSH) 0.9 %
5-40 SYRINGE (ML) INJECTION EVERY 12 HOURS SCHEDULED
Status: CANCELLED | OUTPATIENT
Start: 2023-02-27

## 2023-02-27 RX ORDER — SODIUM CHLORIDE, SODIUM LACTATE, POTASSIUM CHLORIDE, CALCIUM CHLORIDE 600; 310; 30; 20 MG/100ML; MG/100ML; MG/100ML; MG/100ML
INJECTION, SOLUTION INTRAVENOUS CONTINUOUS
Status: CANCELLED | OUTPATIENT
Start: 2023-02-27

## 2023-02-27 NOTE — PROGRESS NOTES
[id number] [facility] Gynecology  [facility] (formerly [facility]) [address]; [address]  [place], [address] [place] [address]  [phone] mn [phone] Fax        Anuel Garcia  1990  Primary Care Physician: BRUCE Lemons NP        [address]: [facility]      2023  MEDICAID CONSENT COMPLETED: No      HPI: [id number] Perez Garcia is a 33 y.o. female   she is being seen for the problems listed below and is planning surgical intervention. She was counseled on all conservative medical and surgical options as well as definitive procedures as well. The pt completed Lupron therapy. 1. Subserous leiomyoma of uterus-Pedunculated posteriorly    2. Endometriosis    3.  Laparoscopy Operative with FOI of Stage 3 endometriosis and Chromopertubation-Patent 2022          Relevant Past History:   Patient Active Problem List    Diagnosis Date Noted    Pelvic pain 2022     Priority: High    Laparoscopy Operative with FOI of Stage 3 endometriosis and Chromopertubation-Patent 2022     Priority: High    Atypical squamous cells of undetermined significance on cytologic smear of cervix (ASC-US) + HRHPV Other 2021     Priority: High     2022 Colposcopy with ECC completed  2023 PAP (if colposcopy in  is negative)      Subserous leiomyoma of uterus-Pedunculated posteriorly 2021     Priority: High    Endometriosis Stage 3 by photos 2020     Priority: High     Pt was in test pool for [place] with Success      Menorrhagia with regular cycle 2016     Priority: High    Hypothyroidism 2020     Priority: Medium    Depression 2020     Priority: Medium     On Clonopin      Acne vulgaris 2016    Family planning 2016     Updating deleted diagnoses      Anxiety          OB History    Para Term  AB Living   0 0 0 0 0 0   SAB IAB Ectopic Molar Multiple Live Births   0 0 0 0 0 0       Past Medical History:   Diagnosis Date    Anxiety 2012    ON RX    Endometriosis     Hyperlipidemia     BORDERLINE NO MEDS    Kidney stones 2011-2016    X 6-PASSED ALL ON OWN    Thyroid disease     hypothyroid    URI (upper respiratory infection) 10/2016    WAS ON ATB, INHALER ORAL COUGH MED, RESOLVED NOW       Past Surgical History:   Procedure Laterality Date    ABDOMINAL EXPLORATION SURGERY  11/15/2016    D&C, chromotubation, fulgration    DILATION AND CURETTAGE  November 2016    LAPAROSCOPY N/A 11/17/2022    OPERATIVE LAPARASCOPY  WITH FULGURATION OF IMPLANTS   AND CHROMOTUBATION performed by Soni Santamaria DO at [address] [place] Right 11/15/2016    biposy of cyst       Social History     Socioeconomic History    Marital status:      Spouse name: Not on file    Number of children: Not on file    Years of education: Not on file    Highest education level: Not on file   Occupational History    Not on file   Tobacco Use    Smoking status: Light Smoker     Packs/day: 0.25     Years: 13.00     Pack years: 3.25     Types: Cigarettes     Start date: 8/1/2008    Smokeless tobacco: Never    Tobacco comments:     I have been reducing cigarettes over the past two months. I am currently down to 5 a day.    Vaping Use    Vaping Use: Never used   Substance and Sexual Activity    Alcohol use: Not Currently     Comment: very rare    Drug use: No    Sexual activity: Yes     Partners: Male     Comment:    Other Topics Concern    Not on file   Social History Narrative    Not on file     Social Determinants of Health     Financial Resource Strain: Not on file   Food Insecurity: Not on file   Transportation Needs: Not on file   Physical Activity: Not on file   Stress: Not on file   Social Connections: Not on file   Intimate Partner Violence: Not on file   Housing Stability: Not on file       Psychosocial History: stable    MEDICATIONS:  Current Outpatient Medications   Medication Sig Dispense Refill    acetaminophen (TYLENOL) 500 MG tablet Take 2 tablets by mouth every 8 (eight) hours 60 tablet 1    NP THYROID 90 MG tablet TAKE 1 TABLET BY MOUTH EVERY DAY      vitamin D (CHOLECALCIFEROL) 125 MCG (5000 UT) CAPS capsule Take 5,000 Units by mouth daily      clonazePAM (KLONOPIN) 0.5 MG tablet TK 1/2 TO 1 T PO BID  1    MAGNESIUM PO Take 250 mg by mouth daily       acetaminophen-codeine (TYLENOL #3) 300-30 MG per tablet TAKE 1 TABLET BY MOUTH EVERY FOUR TO SIX HOURS (Patient not taking: Reported on 2/27/2023)       No current facility-administered medications for this visit. ALLERGIES:  Allergies as of 02/27/2023 - Fully Reviewed 02/27/2023   Allergen Reaction Noted    Ibuprofen Other (See Comments) 11/08/2016           REVIEW OF SYSTEMS:      General appearance:Appears healthy. Alert; in no acute distress. Pleasant. HEENT: Unremarkable   CARDIOVASCULAR: Denies: chest pain, dyspnea on exertion, palpitations  RESPIRATORY: Denies: cough, shortness of breath, wheezing  GI: Denies: abdominal pain, flank pain, nausea, vomiting, diarrhea  : Denies: dysuria, frequency/urgency, hematuria, pelvic pain  MUSCULOSKELETAL: Denies: back pain, joint swelling, muscle pain  SKIN: Denies: rash, hives. HEMATOLOGIC: Denies Bleeding Disorder, Coagulopathy or Transfusion  NEUROLOGIC: Denies Migraines, Headaches, CVA, TIA  ENDOCRINE: +HYPOTHYROIDISM                PHYSICAL EXAM:  Blood pressure 118/80, height 5' 2\" (1.575 m), weight 161 lb (73 kg), last menstrual period 02/24/2023, not currently breastfeeding. General Appearance:  awake, alert, oriented, in no acute distress, well developed, well nourished, in no acute distress   Skin:  Skin color, texture, turgor normal. No rashes or lesions  Mouth/Throat:  Mucosa moist.  No lesions. Pharynx without erythema, edema or exudate. Lungs:  Normal expansion. Clear to auscultation. No rales, rhonchi, or wheezing. Heart:  Heart sounds are normal.  Regular rate and rhythm without murmur, gallop or rub.   Breast:  Declined  Abdomen: Soft, non-tender, normal bowel sounds. No bruits, organomegaly or masses. Extremities: Extremities warm to touch, pink, with no edema. Musculoskeletal:  negative  Peripheral Pulses:  Normal  Neurologic:  Gait normal. Reflexes normal and symmetric. Sensation grossly intact. Female Urogen:  Declined  Female Rectal: Declined        Diagnostics:  No results found. Lab Results:  Results for orders placed or performed during the hospital encounter of 11/17/22   POCT HCG, Prenancy, Ur   Result Value Ref Range    HCG, Pregnancy Urine (POC) NEGATIVE NEGATIVE           The patient was counseled at length about the risks of avtar Covid-19 in the niecy-operative and post-operative states including the recovery window of their procedure. The patient was made aware that avtar Covid-19 after a surgical procedure may worsen their prognosis for recovering from the virus and lend to a higher morbidity and or mortality risk. The patient was given the options of postponing their procedure. All of the risks, benefits, and alternatives were discussed. The patient  wish to proceed with the procedure. Assessment:     Diagnosis Orders   1. Subserous leiomyoma of uterus-Pedunculated posteriorly        2. Endometriosis        3.  Laparoscopy Operative with FOI of Stage 3 endometriosis and Chromopertubation-Patent 11/17/2022            Patient Active Problem List    Diagnosis Date Noted    Pelvic pain 11/17/2022     Priority: High    Laparoscopy Operative with FOI of Stage 3 endometriosis and Chromopertubation-Patent 11/17/2022 11/17/2022     Priority: High    Atypical squamous cells of undetermined significance on cytologic smear of cervix (ASC-US) + HRHPV Other 07/13/2021     Priority: High     Overview Note:     5/5/2022 Colposcopy with ECC completed  5/2023 PAP (if colposcopy in 2022 is negative)      Subserous leiomyoma of uterus-Pedunculated posteriorly 02/05/2021     Priority: High    Endometriosis Stage 3 by photos 12/30/2020     Priority: High     Overview Note:     Pt was in test pool for [place] with Success      Menorrhagia with regular cycle 09/28/2016     Priority: High    Hypothyroidism 12/30/2020     Priority: Medium    Depression 12/30/2020     Priority: Medium     Overview Note:     On Clonopin      Acne vulgaris 09/28/2016    Family planning 09/28/2016     Overview Note:     Updating deleted diagnoses      Anxiety          Permanent Sterilization Completed: No     Plan:  Exploratory Laparotomy  Resection of Large pedunculated Myoma  Completed 6 months of Lupron  Declined MIS approach RBA recovery reviewed in detail  No orders of the defined types were placed in this encounter. Counseling: The patient was counseled on all options both medical and surgical, conservative as well as definitive. She has elected to proceed with the procedure as stated above. The patient was counseled on the procedure. Risks and complications were reviewed in detail. The patients orders, labs, consents have been completed. The history and physical as well as all supporting surgical documentation will be forwarded to the pre-operative holding area. The patient is aware that this procedure may not alleviate her symptoms. That there may be a necessity for a second surgery and that there may be an incomplete removal of abnormal tissue.                  ________________________________________D. O. Date:_______________  Rohit Stover DO          The patient, Owen Sacks is a 33 y.o. female, was seen with a total time spent of 20 minutes for the visit on this date of service by the E/M provider. The time component had both face to face and non face to face time spent in determining the total time component. Counseling and education regarding her diagnosis listed below and her options regarding those diagnoses were also included in determining her time component. Diagnosis Orders   1.  Subserous leiomyoma of uterus-Pedunculated posteriorly        2. Endometriosis        3. Laparoscopy Operative with FOI of Stage 3 endometriosis and Chromopertubation-Patent 11/17/2022             The patient had her preventative health maintenance recommendations and follow-up reviewed with her at the completion of her visit.

## 2023-02-27 NOTE — H&P (VIEW-ONLY)
[id number] [facility] Gynecology  [facility] [address]; [address]  [place], [address] [place] [address]  [phone] mn [phone] Fax        Anuel Garcia  1990  Primary Care Physician: BRUCE Rosas NP        [address]: [facility]      2023  MEDICAID CONSENT COMPLETED: No      HPI: [id number] Perez Garcia is a 33 y.o. female   she is being seen for the problems listed below and is planning surgical intervention. She was counseled on all conservative medical and surgical options as well as definitive procedures as well. The pt completed Lupron therapy. 1. Subserous leiomyoma of uterus-Pedunculated posteriorly    2. Endometriosis    3.  Laparoscopy Operative with FOI of Stage 3 endometriosis and Chromopertubation-Patent 2022          Relevant Past History:   Patient Active Problem List    Diagnosis Date Noted    Pelvic pain 2022     Priority: High    Laparoscopy Operative with FOI of Stage 3 endometriosis and Chromopertubation-Patent 2022     Priority: High    Atypical squamous cells of undetermined significance on cytologic smear of cervix (ASC-US) + HRHPV Other 2021     Priority: High     2022 Colposcopy with ECC completed  2023 PAP (if colposcopy in  is negative)      Subserous leiomyoma of uterus-Pedunculated posteriorly 2021     Priority: High    Endometriosis Stage 3 by photos 2020     Priority: High     Pt was in test pool for [place] with Success      Menorrhagia with regular cycle 2016     Priority: High    Hypothyroidism 2020     Priority: Medium    Depression 2020     Priority: Medium     On Clonopin      Acne vulgaris 2016    Family planning 2016     Updating deleted diagnoses      Anxiety          OB History    Para Term  AB Living   0 0 0 0 0 0   SAB IAB Ectopic Molar Multiple Live Births   0 0 0 0 0 0       Past Medical History:   Diagnosis Date    Anxiety 2012    ON RX    Endometriosis     Hyperlipidemia     BORDERLINE NO MEDS    Kidney stones 2011-2016    X 6-PASSED ALL ON OWN    Thyroid disease     hypothyroid    URI (upper respiratory infection) 10/2016    WAS ON ATB, INHALER ORAL COUGH MED, RESOLVED NOW       Past Surgical History:   Procedure Laterality Date    ABDOMINAL EXPLORATION SURGERY  11/15/2016    D&C, chromotubation, fulgration    DILATION AND CURETTAGE  November 2016    LAPAROSCOPY N/A 11/17/2022    OPERATIVE LAPARASCOPY  WITH FULGURATION OF IMPLANTS   AND CHROMOTUBATION performed by Braydon Germain DO at [address] [place] Right 11/15/2016    biposy of cyst       Social History     Socioeconomic History    Marital status:      Spouse name: Not on file    Number of children: Not on file    Years of education: Not on file    Highest education level: Not on file   Occupational History    Not on file   Tobacco Use    Smoking status: Light Smoker     Packs/day: 0.25     Years: 13.00     Pack years: 3.25     Types: Cigarettes     Start date: 8/1/2008    Smokeless tobacco: Never    Tobacco comments:     I have been reducing cigarettes over the past two months. I am currently down to 5 a day.    Vaping Use    Vaping Use: Never used   Substance and Sexual Activity    Alcohol use: Not Currently     Comment: very rare    Drug use: No    Sexual activity: Yes     Partners: Male     Comment:    Other Topics Concern    Not on file   Social History Narrative    Not on file     Social Determinants of Health     Financial Resource Strain: Not on file   Food Insecurity: Not on file   Transportation Needs: Not on file   Physical Activity: Not on file   Stress: Not on file   Social Connections: Not on file   Intimate Partner Violence: Not on file   Housing Stability: Not on file       Psychosocial History: stable    MEDICATIONS:  Current Outpatient Medications   Medication Sig Dispense Refill    acetaminophen (TYLENOL) 500 MG tablet Take 2 tablets by mouth every 8 (eight) hours 60 tablet 1    NP THYROID 90 MG tablet TAKE 1 TABLET BY MOUTH EVERY DAY      vitamin D (CHOLECALCIFEROL) 125 MCG (5000 UT) CAPS capsule Take 5,000 Units by mouth daily      clonazePAM (KLONOPIN) 0.5 MG tablet TK 1/2 TO 1 T PO BID  1    MAGNESIUM PO Take 250 mg by mouth daily       acetaminophen-codeine (TYLENOL #3) 300-30 MG per tablet TAKE 1 TABLET BY MOUTH EVERY FOUR TO SIX HOURS (Patient not taking: Reported on 2/27/2023)       No current facility-administered medications for this visit. ALLERGIES:  Allergies as of 02/27/2023 - Fully Reviewed 02/27/2023   Allergen Reaction Noted    Ibuprofen Other (See Comments) 11/08/2016           REVIEW OF SYSTEMS:      General appearance:Appears healthy. Alert; in no acute distress. Pleasant. HEENT: Unremarkable   CARDIOVASCULAR: Denies: chest pain, dyspnea on exertion, palpitations  RESPIRATORY: Denies: cough, shortness of breath, wheezing  GI: Denies: abdominal pain, flank pain, nausea, vomiting, diarrhea  : Denies: dysuria, frequency/urgency, hematuria, pelvic pain  MUSCULOSKELETAL: Denies: back pain, joint swelling, muscle pain  SKIN: Denies: rash, hives. HEMATOLOGIC: Denies Bleeding Disorder, Coagulopathy or Transfusion  NEUROLOGIC: Denies Migraines, Headaches, CVA, TIA  ENDOCRINE: +HYPOTHYROIDISM                PHYSICAL EXAM:  Blood pressure 118/80, height 5' 2\" (1.575 m), weight 161 lb (73 kg), last menstrual period 02/24/2023, not currently breastfeeding. General Appearance:  awake, alert, oriented, in no acute distress, well developed, well nourished, in no acute distress   Skin:  Skin color, texture, turgor normal. No rashes or lesions  Mouth/Throat:  Mucosa moist.  No lesions. Pharynx without erythema, edema or exudate. Lungs:  Normal expansion. Clear to auscultation. No rales, rhonchi, or wheezing. Heart:  Heart sounds are normal.  Regular rate and rhythm without murmur, gallop or rub.   Breast:  Declined  Abdomen: Soft, non-tender, normal bowel sounds. No bruits, organomegaly or masses. Extremities: Extremities warm to touch, pink, with no edema. Musculoskeletal:  negative  Peripheral Pulses:  Normal  Neurologic:  Gait normal. Reflexes normal and symmetric. Sensation grossly intact. Female Urogen:  Declined  Female Rectal: Declined        Diagnostics:  No results found. Lab Results:  Results for orders placed or performed during the hospital encounter of 11/17/22   POCT HCG, Prenancy, Ur   Result Value Ref Range    HCG, Pregnancy Urine (POC) NEGATIVE NEGATIVE           The patient was counseled at length about the risks of avtar Covid-19 in the niecy-operative and post-operative states including the recovery window of their procedure. The patient was made aware that avtar Covid-19 after a surgical procedure may worsen their prognosis for recovering from the virus and lend to a higher morbidity and or mortality risk. The patient was given the options of postponing their procedure. All of the risks, benefits, and alternatives were discussed. The patient  wish to proceed with the procedure. Assessment:     Diagnosis Orders   1. Subserous leiomyoma of uterus-Pedunculated posteriorly        2. Endometriosis        3.  Laparoscopy Operative with FOI of Stage 3 endometriosis and Chromopertubation-Patent 11/17/2022            Patient Active Problem List    Diagnosis Date Noted    Pelvic pain 11/17/2022     Priority: High    Laparoscopy Operative with FOI of Stage 3 endometriosis and Chromopertubation-Patent 11/17/2022 11/17/2022     Priority: High    Atypical squamous cells of undetermined significance on cytologic smear of cervix (ASC-US) + HRHPV Other 07/13/2021     Priority: High     Overview Note:     5/5/2022 Colposcopy with ECC completed  5/2023 PAP (if colposcopy in 2022 is negative)      Subserous leiomyoma of uterus-Pedunculated posteriorly 02/05/2021     Priority: High    Endometriosis Stage 3 by photos 12/30/2020     Priority: High     Overview Note:     Pt was in test pool for [place] with Success      Menorrhagia with regular cycle 09/28/2016     Priority: High    Hypothyroidism 12/30/2020     Priority: Medium    Depression 12/30/2020     Priority: Medium     Overview Note:     On Clonopin      Acne vulgaris 09/28/2016    Family planning 09/28/2016     Overview Note:     Updating deleted diagnoses      Anxiety          Permanent Sterilization Completed: No     Plan:  Exploratory Laparotomy  Resection of Large pedunculated Myoma  Completed 6 months of Lupron  Declined MIS approach RBA recovery reviewed in detail  No orders of the defined types were placed in this encounter. Counseling: The patient was counseled on all options both medical and surgical, conservative as well as definitive. She has elected to proceed with the procedure as stated above. The patient was counseled on the procedure. Risks and complications were reviewed in detail. The patients orders, labs, consents have been completed. The history and physical as well as all supporting surgical documentation will be forwarded to the pre-operative holding area. The patient is aware that this procedure may not alleviate her symptoms. That there may be a necessity for a second surgery and that there may be an incomplete removal of abnormal tissue.                  ________________________________________D. O.  Date:_______________  Dhara Saldana DO

## 2023-02-28 ENCOUNTER — HOSPITAL ENCOUNTER (OUTPATIENT)
Dept: PREADMISSION TESTING | Age: 33
Discharge: HOME | End: 2023-03-04
Payer: COMMERCIAL

## 2023-02-28 VITALS
WEIGHT: 160 LBS | DIASTOLIC BLOOD PRESSURE: 71 MMHG | BODY MASS INDEX: 29.44 KG/M2 | OXYGEN SATURATION: 99 % | HEART RATE: 83 BPM | SYSTOLIC BLOOD PRESSURE: 116 MMHG | RESPIRATION RATE: 16 BRPM | HEIGHT: 62 IN | TEMPERATURE: 97.8 F

## 2023-02-28 DIAGNOSIS — Z01.818 PREOP TESTING: ICD-10-CM

## 2023-02-28 LAB
ABO/RH: NORMAL
ABSOLUTE EOS #: 0.2 K/UL (ref 0–0.4)
ABSOLUTE LYMPH #: 2.4 K/UL (ref 1–4.8)
ABSOLUTE MONO #: 0.3 K/UL (ref 0.1–1.3)
ANION GAP SERPL CALCULATED.3IONS-SCNC: 11 MMOL/L (ref 9–17)
ANTIBODY SCREEN: NEGATIVE
ARM BAND NUMBER: NORMAL
BASOPHILS # BLD: 1 % (ref 0–2)
BASOPHILS ABSOLUTE: 0 K/UL (ref 0–0.2)
BILIRUBIN URINE: NEGATIVE
BUN SERPL-MCNC: 6 MG/DL (ref 6–20)
CALCIUM SERPL-MCNC: 9 MG/DL (ref 8.6–10.4)
CHLORIDE SERPL-SCNC: 100 MMOL/L (ref 98–107)
CO2 SERPL-SCNC: 26 MMOL/L (ref 20–31)
COLOR: YELLOW
COMMENT UA: NORMAL
CREAT SERPL-MCNC: 0.63 MG/DL (ref 0.5–0.9)
EOSINOPHILS RELATIVE PERCENT: 3 % (ref 0–4)
EXPIRATION DATE: NORMAL
GFR SERPL CREATININE-BSD FRML MDRD: >60 ML/MIN/1.73M2
GLUCOSE SERPL-MCNC: 101 MG/DL (ref 70–99)
GLUCOSE UR STRIP.AUTO-MCNC: NEGATIVE MG/DL
HCG QUANTITATIVE: <1 MIU/ML
HCT VFR BLD AUTO: 41.2 % (ref 36–46)
HGB BLD-MCNC: 14.2 G/DL (ref 12–16)
KETONES UR STRIP.AUTO-MCNC: NEGATIVE MG/DL
LEUKOCYTE ESTERASE UR QL STRIP.AUTO: NEGATIVE
LYMPHOCYTES # BLD: 29 % (ref 24–44)
MCH RBC QN AUTO: 32.8 PG (ref 26–34)
MCHC RBC AUTO-ENTMCNC: 34.5 G/DL (ref 31–37)
MCV RBC AUTO: 95.2 FL (ref 80–100)
MONOCYTES # BLD: 3 % (ref 1–7)
NITRITE UR QL STRIP.AUTO: NEGATIVE
PDW BLD-RTO: 13.1 % (ref 11.5–14.9)
PLATELET # BLD AUTO: 335 K/UL (ref 150–450)
PMV BLD AUTO: 7.6 FL (ref 6–12)
POTASSIUM SERPL-SCNC: 3.8 MMOL/L (ref 3.7–5.3)
PROT UR STRIP.AUTO-MCNC: 7.5 MG/DL (ref 5–8)
PROT UR STRIP.AUTO-MCNC: NEGATIVE MG/DL
RBC # BLD: 4.33 M/UL (ref 4–5.2)
SEG NEUTROPHILS: 64 % (ref 36–66)
SEGMENTED NEUTROPHILS ABSOLUTE COUNT: 5.4 K/UL (ref 1.3–9.1)
SODIUM SERPL-SCNC: 137 MMOL/L (ref 135–144)
SPECIFIC GRAVITY UA: 1.01 (ref 1–1.03)
TURBIDITY: CLEAR
URINE HGB: NEGATIVE
UROBILINOGEN, URINE: NORMAL
WBC # BLD AUTO: 8.3 K/UL (ref 3.5–11)

## 2023-02-28 PROCEDURE — 84702 CHORIONIC GONADOTROPIN TEST: CPT

## 2023-02-28 PROCEDURE — 86900 BLOOD TYPING SEROLOGIC ABO: CPT

## 2023-02-28 PROCEDURE — 87086 URINE CULTURE/COLONY COUNT: CPT

## 2023-02-28 PROCEDURE — 86850 RBC ANTIBODY SCREEN: CPT

## 2023-02-28 PROCEDURE — 36415 COLL VENOUS BLD VENIPUNCTURE: CPT

## 2023-02-28 PROCEDURE — 80048 BASIC METABOLIC PNL TOTAL CA: CPT

## 2023-02-28 PROCEDURE — 81003 URINALYSIS AUTO W/O SCOPE: CPT

## 2023-02-28 PROCEDURE — 86901 BLOOD TYPING SEROLOGIC RH(D): CPT

## 2023-02-28 PROCEDURE — 85025 COMPLETE CBC W/AUTO DIFF WBC: CPT

## 2023-02-28 RX ORDER — FEXOFENADINE HCL 180 MG/1
180 TABLET ORAL DAILY
COMMUNITY

## 2023-02-28 NOTE — DISCHARGE INSTRUCTIONS
Pre-op Instructions For Out-Patient Surgery    Medication Instructions:  Please stop herbs and any supplements now (includes vitamins and minerals).    Please contact your surgeon and prescribing physician for pre-op instructions for any blood thinners.    If you have inhalers/aerosol treatments at home, please use them the morning of your surgery and bring the inhalers with you to the hospital.    Please take the following medications the morning of your surgery with a sip of water:    Klonopin, thyroid    Surgery Instructions:  After midnight before surgery:  Do not eat or drink anything, including water, mints, gum, and hard candy.  You may brush your teeth without swallowing.  No smoking, chewing tobacco, or street drugs.    Please shower or bathe before surgery.  If you were given Surgical Scrub Chlorhexidine Gluconate Liquid (CHG), please shower the night before and the morning of your surgery following the detailed instructions you received during your pre-admission visit.     Please do not wear any cologne, lotion, powder, deodorant, jewelry, piercings, perfume, makeup, nail polish, hair accessories, or hair spray on the day of surgery.  Wear loose comfortable clothing.    Leave your valuables at home.  Bring a storage case for any glasses/contacts.    An adult who is responsible for you MUST drive you home and should be with you for the first 24 hours after surgery.     If having out-patient knee and foot surgeries, please arrange for planned crutches, walker, or wheelchair before arriving to the hospital.    The Day of Surgery:  Arrive at [facility] Entr[facility] at the time directed by your surgeon and check in at the desk.     If you have a living will or healthcare power of , please bring a copy.    You will be taken to the pre-op holding area where you will be prepared for surgery.  A physical assessment will be performed by a nurse practitioner or  house officer. Your IV will be started and you will meet your anesthesiologist.    When you go to surgery, your family will be directed to the surgical waiting room, where the doctor should speak with them after your surgery. After surgery, you will be taken to the recovery room then when you are awake and stable you will go to the short stay unit for preparation to be discharged. If you use a Bi-PAP or C-PAP machine, please bring it with you and leave it in the car in case it is needed in recovery room.

## 2023-03-01 LAB
MICROORGANISM SPEC CULT: NORMAL
SPECIMEN DESCRIPTION: NORMAL

## 2023-03-03 ENCOUNTER — ANESTHESIA EVENT (OUTPATIENT)
Dept: OPERATING ROOM | Age: 33
DRG: 743 | End: 2023-03-03
Payer: COMMERCIAL

## 2023-03-03 NOTE — PRE-PROCEDURE INSTRUCTIONS
No answer, left message ? yes                          Unable to leave message ? When were you told to arrive at hospital ?  0600    Do you have a  ? Are you on any blood thinners ? If yes when did you stop taking ? Do you have your prep Rx filled and instruction ? N/a     Nothing to eat the day before , only clear liquids. Are you experiencing any covid symptoms ? Do you have any infections or rash we should be aware of ? Do you have the Hibiclens soap to use the night before and the morning of surgery ? Nothing to eat or drink after midnight, only a sip of water to take any medication instructed to take the night before. Wear comfortable clothing, leave any valuables at home, remove any jewelry and body piercing .

## 2023-03-06 ENCOUNTER — ANESTHESIA (OUTPATIENT)
Dept: OPERATING ROOM | Age: 33
DRG: 743 | End: 2023-03-06
Payer: COMMERCIAL

## 2023-03-06 ENCOUNTER — HOSPITAL ENCOUNTER (INPATIENT)
Age: 33
LOS: 1 days | Discharge: HOME | DRG: 743 | End: 2023-03-07
Attending: OBSTETRICS & GYNECOLOGY | Admitting: OBSTETRICS & GYNECOLOGY
Payer: COMMERCIAL

## 2023-03-06 DIAGNOSIS — R10.2 PELVIC PAIN: ICD-10-CM

## 2023-03-06 DIAGNOSIS — G89.18 POSTOPERATIVE PAIN: Primary | ICD-10-CM

## 2023-03-06 DIAGNOSIS — N80.9 ENDOMETRIOSIS: ICD-10-CM

## 2023-03-06 PROBLEM — Z98.890 S/P EXPLORATORY LAPAROTOMY: Status: ACTIVE | Noted: 2023-03-06

## 2023-03-06 PROBLEM — Z98.890 POSTOPERATIVE STATE: Status: ACTIVE | Noted: 2023-03-06

## 2023-03-06 LAB
BILIRUBIN URINE: NEGATIVE
COLOR: YELLOW
COMMENT UA: NORMAL
GLUCOSE UR STRIP.AUTO-MCNC: NEGATIVE MG/DL
HCG, PREGNANCY URINE (POC): NEGATIVE
HCT VFR BLD AUTO: 38.9 % (ref 36–46)
HGB BLD-MCNC: 13.4 G/DL (ref 12–16)
KETONES UR STRIP.AUTO-MCNC: NEGATIVE MG/DL
LEUKOCYTE ESTERASE UR QL STRIP.AUTO: NEGATIVE
NITRITE UR QL STRIP.AUTO: NEGATIVE
PROT UR STRIP.AUTO-MCNC: 5.5 MG/DL (ref 5–8)
PROT UR STRIP.AUTO-MCNC: NEGATIVE MG/DL
SPECIFIC GRAVITY UA: 1.02 (ref 1–1.03)
TURBIDITY: CLEAR
URINE HGB: NEGATIVE
UROBILINOGEN, URINE: NORMAL

## 2023-03-06 PROCEDURE — 6360000002 HC RX W HCPCS: Performed by: ANESTHESIOLOGY

## 2023-03-06 PROCEDURE — 6370000000 HC RX 637 (ALT 250 FOR IP): Performed by: STUDENT IN AN ORGANIZED HEALTH CARE EDUCATION/TRAINING PROGRAM

## 2023-03-06 PROCEDURE — A4216 STERILE WATER/SALINE, 10 ML: HCPCS | Performed by: STUDENT IN AN ORGANIZED HEALTH CARE EDUCATION/TRAINING PROGRAM

## 2023-03-06 PROCEDURE — 3600000012 HC SURGERY LEVEL 2 ADDTL 15MIN: Performed by: OBSTETRICS & GYNECOLOGY

## 2023-03-06 PROCEDURE — 6370000000 HC RX 637 (ALT 250 FOR IP): Performed by: NURSE ANESTHETIST, CERTIFIED REGISTERED

## 2023-03-06 PROCEDURE — 36415 COLL VENOUS BLD VENIPUNCTURE: CPT

## 2023-03-06 PROCEDURE — 1200000000 HC SEMI PRIVATE

## 2023-03-06 PROCEDURE — 6370000000 HC RX 637 (ALT 250 FOR IP): Performed by: ANESTHESIOLOGY

## 2023-03-06 PROCEDURE — 85014 HEMATOCRIT: CPT

## 2023-03-06 PROCEDURE — 3700000001 HC ADD 15 MINUTES (ANESTHESIA): Performed by: OBSTETRICS & GYNECOLOGY

## 2023-03-06 PROCEDURE — 2709999900 HC NON-CHARGEABLE SUPPLY: Performed by: OBSTETRICS & GYNECOLOGY

## 2023-03-06 PROCEDURE — 7100000001 HC PACU RECOVERY - ADDTL 15 MIN: Performed by: OBSTETRICS & GYNECOLOGY

## 2023-03-06 PROCEDURE — 3600000002 HC SURGERY LEVEL 2 BASE: Performed by: OBSTETRICS & GYNECOLOGY

## 2023-03-06 PROCEDURE — 0UB90ZZ EXCISION OF UTERUS, OPEN APPROACH: ICD-10-PCS | Performed by: OBSTETRICS & GYNECOLOGY

## 2023-03-06 PROCEDURE — 2580000003 HC RX 258: Performed by: OBSTETRICS & GYNECOLOGY

## 2023-03-06 PROCEDURE — 3700000000 HC ANESTHESIA ATTENDED CARE: Performed by: OBSTETRICS & GYNECOLOGY

## 2023-03-06 PROCEDURE — 81025 URINE PREGNANCY TEST: CPT

## 2023-03-06 PROCEDURE — 81003 URINALYSIS AUTO W/O SCOPE: CPT

## 2023-03-06 PROCEDURE — 85018 HEMOGLOBIN: CPT

## 2023-03-06 PROCEDURE — 6360000002 HC RX W HCPCS: Performed by: NURSE ANESTHETIST, CERTIFIED REGISTERED

## 2023-03-06 PROCEDURE — 2500000003 HC RX 250 WO HCPCS: Performed by: NURSE ANESTHETIST, CERTIFIED REGISTERED

## 2023-03-06 PROCEDURE — 0U500ZZ DESTRUCTION OF RIGHT OVARY, OPEN APPROACH: ICD-10-PCS | Performed by: OBSTETRICS & GYNECOLOGY

## 2023-03-06 PROCEDURE — 7100000000 HC PACU RECOVERY - FIRST 15 MIN: Performed by: OBSTETRICS & GYNECOLOGY

## 2023-03-06 PROCEDURE — 58140 MYOMECTOMY ABDOM METHOD: CPT | Performed by: OBSTETRICS & GYNECOLOGY

## 2023-03-06 PROCEDURE — 2500000003 HC RX 250 WO HCPCS: Performed by: STUDENT IN AN ORGANIZED HEALTH CARE EDUCATION/TRAINING PROGRAM

## 2023-03-06 PROCEDURE — 2500000003 HC RX 250 WO HCPCS: Performed by: ANESTHESIOLOGY

## 2023-03-06 PROCEDURE — 6360000002 HC RX W HCPCS: Performed by: STUDENT IN AN ORGANIZED HEALTH CARE EDUCATION/TRAINING PROGRAM

## 2023-03-06 PROCEDURE — 88305 TISSUE EXAM BY PATHOLOGIST: CPT

## 2023-03-06 PROCEDURE — 2580000003 HC RX 258: Performed by: STUDENT IN AN ORGANIZED HEALTH CARE EDUCATION/TRAINING PROGRAM

## 2023-03-06 PROCEDURE — 6360000002 HC RX W HCPCS: Performed by: OBSTETRICS & GYNECOLOGY

## 2023-03-06 PROCEDURE — 3E0R3BZ INTRODUCTION OF ANESTHETIC AGENT INTO SPINAL CANAL, PERCUTANEOUS APPROACH: ICD-10-PCS | Performed by: ANESTHESIOLOGY

## 2023-03-06 RX ORDER — SODIUM CHLORIDE 0.9 % (FLUSH) 0.9 %
5-40 SYRINGE (ML) INJECTION PRN
Status: DISCONTINUED | OUTPATIENT
Start: 2023-03-06 | End: 2023-03-06

## 2023-03-06 RX ORDER — ONDANSETRON 2 MG/ML
INJECTION INTRAMUSCULAR; INTRAVENOUS PRN
Status: DISCONTINUED | OUTPATIENT
Start: 2023-03-06 | End: 2023-03-06 | Stop reason: SDUPTHER

## 2023-03-06 RX ORDER — DEXAMETHASONE SODIUM PHOSPHATE 4 MG/ML
INJECTION, SOLUTION INTRA-ARTICULAR; INTRALESIONAL; INTRAMUSCULAR; INTRAVENOUS; SOFT TISSUE PRN
Status: DISCONTINUED | OUTPATIENT
Start: 2023-03-06 | End: 2023-03-06 | Stop reason: SDUPTHER

## 2023-03-06 RX ORDER — OXYCODONE HYDROCHLORIDE 5 MG/1
5 TABLET ORAL EVERY 6 HOURS PRN
Qty: 18 TABLET | Refills: 0 | Status: SHIPPED | OUTPATIENT
Start: 2023-03-06 | End: 2023-03-11

## 2023-03-06 RX ORDER — LABETALOL HYDROCHLORIDE 5 MG/ML
10 INJECTION, SOLUTION INTRAVENOUS
Status: DISCONTINUED | OUTPATIENT
Start: 2023-03-06 | End: 2023-03-06 | Stop reason: HOSPADM

## 2023-03-06 RX ORDER — SODIUM CHLORIDE 9 MG/ML
INJECTION, SOLUTION INTRAVENOUS CONTINUOUS
Status: DISCONTINUED | OUTPATIENT
Start: 2023-03-06 | End: 2023-03-07 | Stop reason: HOSPADM

## 2023-03-06 RX ORDER — SODIUM CHLORIDE 0.9 % (FLUSH) 0.9 %
5-40 SYRINGE (ML) INJECTION EVERY 12 HOURS SCHEDULED
Status: DISCONTINUED | OUTPATIENT
Start: 2023-03-06 | End: 2023-03-07 | Stop reason: HOSPADM

## 2023-03-06 RX ORDER — SODIUM CHLORIDE 0.9 % (FLUSH) 0.9 %
5-40 SYRINGE (ML) INJECTION EVERY 12 HOURS SCHEDULED
Status: DISCONTINUED | OUTPATIENT
Start: 2023-03-06 | End: 2023-03-06 | Stop reason: HOSPADM

## 2023-03-06 RX ORDER — SODIUM CHLORIDE 0.9 % (FLUSH) 0.9 %
5-40 SYRINGE (ML) INJECTION EVERY 12 HOURS SCHEDULED
Status: DISCONTINUED | OUTPATIENT
Start: 2023-03-06 | End: 2023-03-06

## 2023-03-06 RX ORDER — DIPHENHYDRAMINE HYDROCHLORIDE 50 MG/ML
12.5 INJECTION INTRAMUSCULAR; INTRAVENOUS
Status: DISCONTINUED | OUTPATIENT
Start: 2023-03-06 | End: 2023-03-06 | Stop reason: HOSPADM

## 2023-03-06 RX ORDER — SODIUM CHLORIDE 9 MG/ML
INJECTION, SOLUTION INTRAVENOUS PRN
Status: DISCONTINUED | OUTPATIENT
Start: 2023-03-06 | End: 2023-03-06

## 2023-03-06 RX ORDER — LIDOCAINE HYDROCHLORIDE 20 MG/ML
INJECTION, SOLUTION EPIDURAL; INFILTRATION; INTRACAUDAL; PERINEURAL PRN
Status: DISCONTINUED | OUTPATIENT
Start: 2023-03-06 | End: 2023-03-06 | Stop reason: SDUPTHER

## 2023-03-06 RX ORDER — SODIUM CHLORIDE 0.9 % (FLUSH) 0.9 %
5-40 SYRINGE (ML) INJECTION PRN
Status: DISCONTINUED | OUTPATIENT
Start: 2023-03-06 | End: 2023-03-07 | Stop reason: HOSPADM

## 2023-03-06 RX ORDER — MEPERIDINE HYDROCHLORIDE 25 MG/ML
12.5 INJECTION INTRAMUSCULAR; INTRAVENOUS; SUBCUTANEOUS EVERY 5 MIN PRN
Status: DISCONTINUED | OUTPATIENT
Start: 2023-03-06 | End: 2023-03-06 | Stop reason: HOSPADM

## 2023-03-06 RX ORDER — OXYCODONE HYDROCHLORIDE 5 MG/1
10 TABLET ORAL EVERY 4 HOURS PRN
Status: DISCONTINUED | OUTPATIENT
Start: 2023-03-06 | End: 2023-03-07 | Stop reason: HOSPADM

## 2023-03-06 RX ORDER — FAMOTIDINE 20 MG/1
20 TABLET, FILM COATED ORAL 2 TIMES DAILY
Status: DISCONTINUED | OUTPATIENT
Start: 2023-03-06 | End: 2023-03-07 | Stop reason: HOSPADM

## 2023-03-06 RX ORDER — CALCIUM CARBONATE 200(500)MG
500 TABLET,CHEWABLE ORAL 3 TIMES DAILY PRN
Status: DISCONTINUED | OUTPATIENT
Start: 2023-03-06 | End: 2023-03-07 | Stop reason: HOSPADM

## 2023-03-06 RX ORDER — KETOROLAC TROMETHAMINE 30 MG/ML
30 INJECTION, SOLUTION INTRAMUSCULAR; INTRAVENOUS EVERY 6 HOURS
Status: COMPLETED | OUTPATIENT
Start: 2023-03-06 | End: 2023-03-07

## 2023-03-06 RX ORDER — ACETAMINOPHEN 500 MG
1000 TABLET ORAL ONCE
Status: COMPLETED | OUTPATIENT
Start: 2023-03-06 | End: 2023-03-06

## 2023-03-06 RX ORDER — GABAPENTIN 100 MG/1
100 CAPSULE ORAL 3 TIMES DAILY PRN
Qty: 9 CAPSULE | Refills: 0 | Status: SHIPPED | OUTPATIENT
Start: 2023-03-06 | End: 2023-03-09

## 2023-03-06 RX ORDER — CLONAZEPAM 0.5 MG/1
0.5 TABLET ORAL NIGHTLY PRN
Status: DISCONTINUED | OUTPATIENT
Start: 2023-03-06 | End: 2023-03-07 | Stop reason: HOSPADM

## 2023-03-06 RX ORDER — ACETAMINOPHEN 500 MG
1000 TABLET ORAL EVERY 6 HOURS PRN
Qty: 60 TABLET | Refills: 1 | Status: SHIPPED | OUTPATIENT
Start: 2023-03-06

## 2023-03-06 RX ORDER — MIDAZOLAM HYDROCHLORIDE 1 MG/ML
INJECTION INTRAMUSCULAR; INTRAVENOUS PRN
Status: DISCONTINUED | OUTPATIENT
Start: 2023-03-06 | End: 2023-03-06 | Stop reason: SDUPTHER

## 2023-03-06 RX ORDER — DOCUSATE SODIUM 100 MG/1
100 CAPSULE, LIQUID FILLED ORAL 2 TIMES DAILY
Status: DISCONTINUED | OUTPATIENT
Start: 2023-03-06 | End: 2023-03-07 | Stop reason: HOSPADM

## 2023-03-06 RX ORDER — FENTANYL CITRATE 50 UG/ML
INJECTION, SOLUTION INTRAMUSCULAR; INTRAVENOUS PRN
Status: DISCONTINUED | OUTPATIENT
Start: 2023-03-06 | End: 2023-03-06 | Stop reason: SDUPTHER

## 2023-03-06 RX ORDER — SODIUM CHLORIDE, SODIUM LACTATE, POTASSIUM CHLORIDE, CALCIUM CHLORIDE 600; 310; 30; 20 MG/100ML; MG/100ML; MG/100ML; MG/100ML
INJECTION, SOLUTION INTRAVENOUS CONTINUOUS
Status: DISCONTINUED | OUTPATIENT
Start: 2023-03-06 | End: 2023-03-06

## 2023-03-06 RX ORDER — PROCHLORPERAZINE EDISYLATE 5 MG/ML
10 INJECTION INTRAMUSCULAR; INTRAVENOUS EVERY 6 HOURS PRN
Status: DISCONTINUED | OUTPATIENT
Start: 2023-03-06 | End: 2023-03-07 | Stop reason: HOSPADM

## 2023-03-06 RX ORDER — FENTANYL CITRATE 0.05 MG/ML
25 INJECTION, SOLUTION INTRAMUSCULAR; INTRAVENOUS EVERY 5 MIN PRN
Status: DISCONTINUED | OUTPATIENT
Start: 2023-03-06 | End: 2023-03-06 | Stop reason: HOSPADM

## 2023-03-06 RX ORDER — ACETAMINOPHEN 500 MG
1000 TABLET ORAL EVERY 6 HOURS
Status: DISCONTINUED | OUTPATIENT
Start: 2023-03-06 | End: 2023-03-07 | Stop reason: HOSPADM

## 2023-03-06 RX ORDER — BUPIVACAINE HYDROCHLORIDE 7.5 MG/ML
INJECTION, SOLUTION INTRASPINAL
Status: COMPLETED | OUTPATIENT
Start: 2023-03-06 | End: 2023-03-06

## 2023-03-06 RX ORDER — GABAPENTIN 300 MG/1
300 CAPSULE ORAL ONCE
Status: COMPLETED | OUTPATIENT
Start: 2023-03-06 | End: 2023-03-06

## 2023-03-06 RX ORDER — ROCURONIUM BROMIDE 10 MG/ML
INJECTION, SOLUTION INTRAVENOUS PRN
Status: DISCONTINUED | OUTPATIENT
Start: 2023-03-06 | End: 2023-03-06 | Stop reason: SDUPTHER

## 2023-03-06 RX ORDER — OXYCODONE HYDROCHLORIDE 5 MG/1
5 TABLET ORAL EVERY 4 HOURS PRN
Status: DISCONTINUED | OUTPATIENT
Start: 2023-03-06 | End: 2023-03-07 | Stop reason: HOSPADM

## 2023-03-06 RX ORDER — LANOLIN ALCOHOL/MO/W.PET/CERES
3 CREAM (GRAM) TOPICAL NIGHTLY PRN
Status: DISCONTINUED | OUTPATIENT
Start: 2023-03-06 | End: 2023-03-07 | Stop reason: HOSPADM

## 2023-03-06 RX ORDER — ONDANSETRON 2 MG/ML
4 INJECTION INTRAMUSCULAR; INTRAVENOUS EVERY 6 HOURS PRN
Status: DISCONTINUED | OUTPATIENT
Start: 2023-03-06 | End: 2023-03-06

## 2023-03-06 RX ORDER — SODIUM CHLORIDE 0.9 % (FLUSH) 0.9 %
5-40 SYRINGE (ML) INJECTION PRN
Status: DISCONTINUED | OUTPATIENT
Start: 2023-03-06 | End: 2023-03-06 | Stop reason: HOSPADM

## 2023-03-06 RX ORDER — DIPHENHYDRAMINE HCL 25 MG
25 TABLET ORAL EVERY 6 HOURS PRN
Status: DISCONTINUED | OUTPATIENT
Start: 2023-03-06 | End: 2023-03-07 | Stop reason: HOSPADM

## 2023-03-06 RX ORDER — SODIUM CHLORIDE, SODIUM LACTATE, POTASSIUM CHLORIDE, CALCIUM CHLORIDE 600; 310; 30; 20 MG/100ML; MG/100ML; MG/100ML; MG/100ML
INJECTION, SOLUTION INTRAVENOUS CONTINUOUS
Status: DISCONTINUED | OUTPATIENT
Start: 2023-03-06 | End: 2023-03-06 | Stop reason: SDUPTHER

## 2023-03-06 RX ORDER — LIDOCAINE HYDROCHLORIDE 10 MG/ML
1 INJECTION, SOLUTION EPIDURAL; INFILTRATION; INTRACAUDAL; PERINEURAL
Status: DISCONTINUED | OUTPATIENT
Start: 2023-03-06 | End: 2023-03-06

## 2023-03-06 RX ORDER — SODIUM CHLORIDE 9 MG/ML
INJECTION, SOLUTION INTRAVENOUS PRN
Status: DISCONTINUED | OUTPATIENT
Start: 2023-03-06 | End: 2023-03-06 | Stop reason: SDUPTHER

## 2023-03-06 RX ORDER — ENOXAPARIN SODIUM 100 MG/ML
40 INJECTION SUBCUTANEOUS DAILY
Status: DISCONTINUED | OUTPATIENT
Start: 2023-03-06 | End: 2023-03-07 | Stop reason: HOSPADM

## 2023-03-06 RX ORDER — NALOXONE HYDROCHLORIDE 0.4 MG/ML
INJECTION, SOLUTION INTRAMUSCULAR; INTRAVENOUS; SUBCUTANEOUS PRN
Status: DISCONTINUED | OUTPATIENT
Start: 2023-03-06 | End: 2023-03-06

## 2023-03-06 RX ORDER — ONDANSETRON 4 MG/1
4 TABLET, ORALLY DISINTEGRATING ORAL EVERY 8 HOURS PRN
Status: DISCONTINUED | OUTPATIENT
Start: 2023-03-06 | End: 2023-03-07 | Stop reason: HOSPADM

## 2023-03-06 RX ORDER — SIMETHICONE 80 MG
80 TABLET,CHEWABLE ORAL EVERY 6 HOURS PRN
Status: DISCONTINUED | OUTPATIENT
Start: 2023-03-06 | End: 2023-03-07 | Stop reason: HOSPADM

## 2023-03-06 RX ORDER — ALBUTEROL SULFATE 90 UG/1
AEROSOL, METERED RESPIRATORY (INHALATION) PRN
Status: DISCONTINUED | OUTPATIENT
Start: 2023-03-06 | End: 2023-03-06 | Stop reason: SDUPTHER

## 2023-03-06 RX ORDER — ONDANSETRON 2 MG/ML
4 INJECTION INTRAMUSCULAR; INTRAVENOUS EVERY 6 HOURS PRN
Status: DISCONTINUED | OUTPATIENT
Start: 2023-03-06 | End: 2023-03-07 | Stop reason: HOSPADM

## 2023-03-06 RX ORDER — SODIUM CHLORIDE 0.9 % (FLUSH) 0.9 %
5-40 SYRINGE (ML) INJECTION EVERY 12 HOURS SCHEDULED
Status: DISCONTINUED | OUTPATIENT
Start: 2023-03-06 | End: 2023-03-06 | Stop reason: SDUPTHER

## 2023-03-06 RX ORDER — PROPOFOL 10 MG/ML
INJECTION, EMULSION INTRAVENOUS PRN
Status: DISCONTINUED | OUTPATIENT
Start: 2023-03-06 | End: 2023-03-06 | Stop reason: SDUPTHER

## 2023-03-06 RX ORDER — SODIUM CHLORIDE 0.9 % (FLUSH) 0.9 %
5-40 SYRINGE (ML) INJECTION PRN
Status: DISCONTINUED | OUTPATIENT
Start: 2023-03-06 | End: 2023-03-06 | Stop reason: SDUPTHER

## 2023-03-06 RX ORDER — SODIUM CHLORIDE 9 MG/ML
INJECTION, SOLUTION INTRAVENOUS PRN
Status: DISCONTINUED | OUTPATIENT
Start: 2023-03-06 | End: 2023-03-07 | Stop reason: HOSPADM

## 2023-03-06 RX ORDER — HYDRALAZINE HYDROCHLORIDE 20 MG/ML
10 INJECTION INTRAMUSCULAR; INTRAVENOUS
Status: DISCONTINUED | OUTPATIENT
Start: 2023-03-06 | End: 2023-03-06 | Stop reason: HOSPADM

## 2023-03-06 RX ORDER — MORPHINE SULFATE 1 MG/ML
INJECTION, SOLUTION EPIDURAL; INTRATHECAL; INTRAVENOUS
Status: COMPLETED | OUTPATIENT
Start: 2023-03-06 | End: 2023-03-06

## 2023-03-06 RX ORDER — METOCLOPRAMIDE HYDROCHLORIDE 5 MG/ML
10 INJECTION INTRAMUSCULAR; INTRAVENOUS
Status: DISCONTINUED | OUTPATIENT
Start: 2023-03-06 | End: 2023-03-06 | Stop reason: HOSPADM

## 2023-03-06 RX ORDER — GABAPENTIN 300 MG/1
300 CAPSULE ORAL 3 TIMES DAILY PRN
Qty: 90 CAPSULE | Refills: 0 | Status: SHIPPED | OUTPATIENT
Start: 2023-03-06 | End: 2023-03-06 | Stop reason: HOSPADM

## 2023-03-06 RX ORDER — ONDANSETRON 2 MG/ML
4 INJECTION INTRAMUSCULAR; INTRAVENOUS
Status: DISCONTINUED | OUTPATIENT
Start: 2023-03-06 | End: 2023-03-06 | Stop reason: HOSPADM

## 2023-03-06 RX ORDER — SODIUM CHLORIDE 9 MG/ML
INJECTION, SOLUTION INTRAVENOUS PRN
Status: DISCONTINUED | OUTPATIENT
Start: 2023-03-06 | End: 2023-03-06 | Stop reason: HOSPADM

## 2023-03-06 RX ORDER — ACETAMINOPHEN 325 MG/1
650 TABLET ORAL
Status: DISCONTINUED | OUTPATIENT
Start: 2023-03-06 | End: 2023-03-06 | Stop reason: HOSPADM

## 2023-03-06 RX ORDER — SENNA AND DOCUSATE SODIUM 50; 8.6 MG/1; MG/1
1 TABLET, FILM COATED ORAL DAILY
Qty: 30 TABLET | Refills: 1 | Status: SHIPPED | OUTPATIENT
Start: 2023-03-06

## 2023-03-06 RX ORDER — GABAPENTIN 100 MG/1
100 CAPSULE ORAL 3 TIMES DAILY
Status: DISCONTINUED | OUTPATIENT
Start: 2023-03-06 | End: 2023-03-07 | Stop reason: HOSPADM

## 2023-03-06 RX ADMIN — ROCURONIUM BROMIDE 50 MG: 10 INJECTION, SOLUTION INTRAVENOUS at 08:13

## 2023-03-06 RX ADMIN — KETOROLAC TROMETHAMINE 30 MG: 30 INJECTION, SOLUTION INTRAMUSCULAR; INTRAVENOUS at 16:00

## 2023-03-06 RX ADMIN — FENTANYL CITRATE 50 MCG: 50 INJECTION, SOLUTION INTRAMUSCULAR; INTRAVENOUS at 08:13

## 2023-03-06 RX ADMIN — OXYCODONE HYDROCHLORIDE 5 MG: 5 TABLET ORAL at 20:27

## 2023-03-06 RX ADMIN — ONDANSETRON 4 MG: 2 INJECTION INTRAMUSCULAR; INTRAVENOUS at 12:09

## 2023-03-06 RX ADMIN — ACETAMINOPHEN 1000 MG: 500 TABLET ORAL at 23:02

## 2023-03-06 RX ADMIN — SODIUM CHLORIDE, POTASSIUM CHLORIDE, SODIUM LACTATE AND CALCIUM CHLORIDE: 600; 310; 30; 20 INJECTION, SOLUTION INTRAVENOUS at 07:09

## 2023-03-06 RX ADMIN — DOCUSATE SODIUM 100 MG: 100 CAPSULE, LIQUID FILLED ORAL at 20:24

## 2023-03-06 RX ADMIN — FENTANYL CITRATE 50 MCG: 50 INJECTION, SOLUTION INTRAMUSCULAR; INTRAVENOUS at 08:22

## 2023-03-06 RX ADMIN — SODIUM CHLORIDE, PRESERVATIVE FREE 10 ML: 5 INJECTION INTRAVENOUS at 23:03

## 2023-03-06 RX ADMIN — PROPOFOL 200 MG: 10 INJECTION, EMULSION INTRAVENOUS at 08:13

## 2023-03-06 RX ADMIN — FAMOTIDINE 20 MG: 10 INJECTION, SOLUTION INTRAVENOUS at 11:14

## 2023-03-06 RX ADMIN — ONDANSETRON 4 MG: 2 INJECTION, SOLUTION INTRAMUSCULAR; INTRAVENOUS at 08:20

## 2023-03-06 RX ADMIN — Medication 2000 MG: at 08:18

## 2023-03-06 RX ADMIN — FAMOTIDINE 20 MG: 20 TABLET, FILM COATED ORAL at 20:24

## 2023-03-06 RX ADMIN — ACETAMINOPHEN 1000 MG: 500 TABLET ORAL at 07:10

## 2023-03-06 RX ADMIN — ACETAMINOPHEN 1000 MG: 500 TABLET ORAL at 15:59

## 2023-03-06 RX ADMIN — DIPHENHYDRAMINE HYDROCHLORIDE 25 MG: 25 TABLET ORAL at 13:57

## 2023-03-06 RX ADMIN — Medication 2000 MG: at 23:11

## 2023-03-06 RX ADMIN — MORPHINE SULFATE 0.3 MG: 1 INJECTION EPIDURAL; INTRATHECAL; INTRAVENOUS at 08:06

## 2023-03-06 RX ADMIN — ENOXAPARIN SODIUM 40 MG: 100 INJECTION SUBCUTANEOUS at 20:28

## 2023-03-06 RX ADMIN — PROPOFOL 50 MG: 10 INJECTION, EMULSION INTRAVENOUS at 08:20

## 2023-03-06 RX ADMIN — Medication 2 PUFF: at 08:20

## 2023-03-06 RX ADMIN — SODIUM CHLORIDE: 9 INJECTION, SOLUTION INTRAVENOUS at 10:40

## 2023-03-06 RX ADMIN — GABAPENTIN 300 MG: 300 CAPSULE ORAL at 07:10

## 2023-03-06 RX ADMIN — SUGAMMADEX 200 MG: 100 INJECTION, SOLUTION INTRAVENOUS at 09:10

## 2023-03-06 RX ADMIN — Medication 2000 MG: at 16:03

## 2023-03-06 RX ADMIN — BUPIVACAINE HYDROCHLORIDE IN DEXTROSE 7.5 MG: 7.5 INJECTION, SOLUTION SUBARACHNOID at 08:06

## 2023-03-06 RX ADMIN — MIDAZOLAM 2 MG: 1 INJECTION INTRAMUSCULAR; INTRAVENOUS at 07:58

## 2023-03-06 RX ADMIN — GABAPENTIN 100 MG: 100 CAPSULE ORAL at 20:24

## 2023-03-06 RX ADMIN — LIDOCAINE HYDROCHLORIDE 50 MG: 20 INJECTION, SOLUTION EPIDURAL; INFILTRATION; INTRACAUDAL; PERINEURAL at 08:13

## 2023-03-06 RX ADMIN — ACETAMINOPHEN 1000 MG: 500 TABLET ORAL at 11:14

## 2023-03-06 RX ADMIN — DOCUSATE SODIUM 100 MG: 100 CAPSULE, LIQUID FILLED ORAL at 11:14

## 2023-03-06 RX ADMIN — ROCURONIUM BROMIDE 20 MG: 10 INJECTION, SOLUTION INTRAVENOUS at 08:21

## 2023-03-06 RX ADMIN — DEXAMETHASONE SODIUM PHOSPHATE 8 MG: 4 INJECTION, SOLUTION INTRA-ARTICULAR; INTRALESIONAL; INTRAMUSCULAR; INTRAVENOUS; SOFT TISSUE at 08:20

## 2023-03-06 RX ADMIN — HYDROMORPHONE HYDROCHLORIDE 0.5 MG: 0.5 INJECTION, SOLUTION INTRAMUSCULAR; INTRAVENOUS; SUBCUTANEOUS at 09:51

## 2023-03-06 RX ADMIN — KETOROLAC TROMETHAMINE 30 MG: 30 INJECTION, SOLUTION INTRAMUSCULAR; INTRAVENOUS at 23:02

## 2023-03-06 RX ADMIN — KETOROLAC TROMETHAMINE 30 MG: 30 INJECTION, SOLUTION INTRAMUSCULAR; INTRAVENOUS at 11:15

## 2023-03-06 ASSESSMENT — PAIN DESCRIPTION - PAIN TYPE
TYPE: SURGICAL PAIN
TYPE: SURGICAL PAIN

## 2023-03-06 ASSESSMENT — PAIN SCALES - GENERAL
PAINLEVEL_OUTOF10: 3
PAINLEVEL_OUTOF10: 0
PAINLEVEL_OUTOF10: 8
PAINLEVEL_OUTOF10: 7
PAINLEVEL_OUTOF10: 9
PAINLEVEL_OUTOF10: 8
PAINLEVEL_OUTOF10: 9
PAINLEVEL_OUTOF10: 3
PAINLEVEL_OUTOF10: 6

## 2023-03-06 ASSESSMENT — PAIN - FUNCTIONAL ASSESSMENT
PAIN_FUNCTIONAL_ASSESSMENT: ACTIVITIES ARE NOT PREVENTED
PAIN_FUNCTIONAL_ASSESSMENT: NONE - DENIES PAIN
PAIN_FUNCTIONAL_ASSESSMENT: ACTIVITIES ARE NOT PREVENTED

## 2023-03-06 ASSESSMENT — PAIN DESCRIPTION - DESCRIPTORS
DESCRIPTORS: ACHING
DESCRIPTORS: ACHING;SHARP
DESCRIPTORS: ACHING
DESCRIPTORS: ACHING

## 2023-03-06 ASSESSMENT — PAIN DESCRIPTION - ORIENTATION
ORIENTATION: MID
ORIENTATION: LOWER
ORIENTATION: MID

## 2023-03-06 ASSESSMENT — LIFESTYLE VARIABLES: SMOKING_STATUS: 0

## 2023-03-06 ASSESSMENT — PAIN DESCRIPTION - LOCATION
LOCATION: ABDOMEN

## 2023-03-06 ASSESSMENT — ENCOUNTER SYMPTOMS
STRIDOR: 0
SHORTNESS OF BREATH: 0

## 2023-03-06 NOTE — DISCHARGE SUMMARY
Gyn Discharge Summary  [facility]      Patient Name: Dipika Reyes  Patient : 1990  Primary Care Physician: Pelon Kwok, APRN - NP  Admit Date: 3/6/2023    Principal Diagnosis: S/p exploratory laparotomy, myomectomy, fulguration of endometriosis    Other Diagnosis:   Pelvic pain [R10.2]  Endometriosis [N80.9]  Postoperative state [Z98.890]  Patient Active Problem List   Diagnosis    Anxiety    Acne vulgaris    Family planning    Menorrhagia with regular cycle    Endometriosis Stage 3 by photos    Hypothyroidism    Depression    Subserous leiomyoma of uterus-Pedunculated posteriorly    Atypical squamous cells of undetermined significance on cytologic smear of cervix (ASC-US) + HRHPV Other    Pelvic pain    Laparoscopy Operative with FOI of Stage 3 endometriosis and Chromopertubation-Patent 2022    Exploratory laparotomy Uterine Myomectomy and fulguration of endometriosis 3/6/2023    Postoperative state       Infection: No  Hospital Acquired: n/a    Surgical Operations & Procedures: Exploratory laparotomy, myomectomy, fulguration of endometriosis    Consultations: none    Pertinent Findings & Procedures:   Dipika Reyes is a 33 y.o. female [facility]. She underwent exploratory laparotomy, myomectomy, fulguration of endometriosis on 3/6/23. Post-op course normal, discharged home POD#1. Follow up in 2 weeks. Discharge instructions reviewed and questions answered. Course of patient: normal    Discharge to: Home    Readmission planned: No    Recommendations on Discharge:     Medications:     Medication List        START taking these medications      gabapentin 100 MG capsule  Commonly known as: NEURONTIN  Take 1 capsule by mouth 3 times daily as needed (pain) for up to 3 days. oxyCODONE 5 MG immediate release tablet  Commonly known as: Roxicodone  Take 1 tablet by mouth every 6 hours as needed for Pain for up to 5 days. Intended supply: 3 days.  Take lowest dose possible to manage pain Max Daily Amount: 20 mg     sennosides-docusate sodium 8.6-50 MG tablet  Commonly known as: SENOKOT-S  Take 1 tablet by mouth daily            CHANGE how you take these medications      acetaminophen 500 MG tablet  Commonly known as: TYLENOL  Take 2 tablets by mouth every 6 hours as needed for Pain  What changed:   when to take this  reasons to take this            CONTINUE taking these medications      clonazePAM 0.5 MG tablet  Commonly known as: KLONOPIN     fexofenadine 180 MG tablet  Commonly known as: ALLEGRA     MAGNESIUM PO     NP Thyroid 90 MG tablet  Generic drug: thyroid     vitamin D 125 MCG (5000 UT) Caps capsule  Commonly known as: CHOLECALCIFEROL               Where to Get Your Medications        These medications were sent to [address] [facility] [address]      Phone: [phone]   acetaminophen 500 MG tablet  gabapentin 100 MG capsule  oxyCODONE 5 MG immediate release tablet  sennosides-docusate sodium 8.6-50 MG tablet           Activity: pelvic rest x 4-6 weeks, no driving on narcotics, no lifting greater than 15 lbs  Diet: regular diet  Follow up: 2 weeks     Condition on discharge: good and stable   Discharge Date: 3/7/23    Comments:  Home care, Follow-up care, restrictions reviewed.     Lilly Krueger DO  Ob/Gyn Resident  [facility]  3/7/2023, 12:46 PM

## 2023-03-06 NOTE — ANESTHESIA PRE PROCEDURE
Department of Anesthesiology  Preprocedure Note       Name:  Humberto Bates   Age:  33 y.o.  :  1990                                          MRN:  [id number]         Date:  3/6/2023      Surgeon: Alina Prescott):  Florentin Reveles DO    Procedure: Procedure(s):  LAPAROTOMY OPEN VIA PFANNENSTIEL FOR REMOVAL OF MYOMA FULGERATION OF IMPLANTS    Medications prior to admission:   Prior to Admission medications    Medication Sig Start Date End Date Taking? Authorizing Provider   oxyCODONE (ROXICODONE) 5 MG immediate release tablet Take 1 tablet by mouth every 6 hours as needed for Pain for up to 5 days. Intended supply: 3 days. Take lowest dose possible to manage pain Max Daily Amount: 20 mg 3/6/23 3/11/23 Yes Shakira Ureña DO   acetaminophen (TYLENOL) 500 MG tablet Take 2 tablets by mouth every 6 hours as needed for Pain 3/6/23  Yes Shakira Ureña DO   sennosides-docusate sodium (SENOKOT-S) 8.6-50 MG tablet Take 1 tablet by mouth daily 3/6/23  Yes Shakira Ureña DO   fexofenadine (ALLEGRA) 180 MG tablet Take 180 mg by mouth daily    Historical Provider, MD   NP THYROID 90 MG tablet TAKE 1 TABLET BY MOUTH EVERY DAY 21   Historical Provider, MD   vitamin D (CHOLECALCIFEROL) 125 MCG (5000 UT) CAPS capsule Take 5,000 Units by mouth daily    Historical Provider, MD   clonazePAM (KLONOPIN) 0.5 MG tablet Take 0.5 mg by mouth nightly as needed for Anxiety.  Takes 0.5 tab 16   Historical Provider, MD   MAGNESIUM PO Take 250 mg by mouth daily     Historical Provider, MD       Current medications:    Current Facility-Administered Medications   Medication Dose Route Frequency Provider Last Rate Last Admin    lidocaine PF 1 % injection 1 mL  1 mL IntraDERmal Once PRN Amira Alfaro MD        lactated ringers IV soln infusion   IntraVENous Continuous Florentin Reveles  mL/hr at 23 0709 New Bag at 23 0709    sodium chloride flush 0.9 % injection 5-40 mL  5-40 mL IntraVENous 2 times per day Dayanara Bridges,         sodium chloride flush 0.9 % injection 5-40 mL  5-40 mL IntraVENous PRN Dayanara Bridges, DO        0.9 % sodium chloride infusion   IntraVENous PRN Dayanara Bridges DO        ceFAZolin (ANCEF) 2000 mg in 0.9% sodium chloride 50 mL IVPB  2,000 mg IntraVENous On Call to [address], DO           Allergies: Allergies   Allergen Reactions    Ibuprofen Other (See Comments)     GI PAIN AND BLOATING         Problem List:    Patient Active Problem List   Diagnosis Code    Anxiety F41.9    Acne vulgaris L70.0    Family planning Z30.09    Menorrhagia with regular cycle N92.0    Endometriosis Stage 3 by photos N80.9    Hypothyroidism E03.8    Depression F32. A    Subserous leiomyoma of uterus-Pedunculated posteriorly D25.2    Atypical squamous cells of undetermined significance on cytologic smear of cervix (ASC-US) + HRHPV Other R87.610    Pelvic pain R10.2    Laparoscopy Operative with FOI of Stage 3 endometriosis and Chromopertubation-Patent 11/17/2022 Z98.890       Past Medical History:        Diagnosis Date    Anxiety 2012    ON RX    Endometriosis     Hyperlipidemia     BORDERLINE NO MEDS    Kidney stones 2011-2016    X 6-PASSED ALL ON OWN    Thyroid disease     hypothyroid    URI (upper respiratory infection) 10/2016    WAS ON ATB, INHALER ORAL COUGH MED, RESOLVED NOW       Past Surgical History:        Procedure Laterality Date    ABDOMINAL EXPLORATION SURGERY  11/15/2016    D&C, chromotubation, fulgration    DILATION AND CURETTAGE  November 2016    LAPAROSCOPY N/A 11/17/2022    OPERATIVE LAPARASCOPY  WITH FULGURATION OF IMPLANTS   AND CHROMOTUBATION performed by Dayanara Bridges DO at [facility] Right 11/15/2016    biposy of cyst       Social History:    Social History     Tobacco Use    Smoking status: Light Smoker     Packs/day: 0.25     Years: 13.00     Pack years: 3.25     Types: Cigarettes     Start date: 8/1/2008    Smokeless tobacco: Never    Tobacco comments:     I have been reducing cigarettes over the past two months. I am currently down to 5 a day. Substance Use Topics    Alcohol use: Not Currently     Comment: very rare                                Ready to quit: Not Answered  Counseling given: Not Answered  Tobacco comments: I have been reducing cigarettes over the past two months. I am currently down to 5 a day. Vital Signs (Current):   Vitals:    03/06/23 0639   BP: (!) 113/54   Pulse: 82   Resp: 18   Temp: 97.3 °F (36.3 °C)   TempSrc: Infrared   SpO2: 96%   Weight: 160 lb (72.6 kg)   Height: 5' 2\" (1.575 m)                                              BP Readings from Last 3 Encounters:   03/06/23 (!) 113/54   02/28/23 116/71   02/27/23 118/80       NPO Status: Time of last liquid consumption: 1800                        Time of last solid consumption: 1800                        Date of last liquid consumption: 03/05/23                        Date of last solid food consumption: 03/05/23    BMI:   Wt Readings from Last 3 Encounters:   03/06/23 160 lb (72.6 kg)   02/28/23 160 lb (72.6 kg)   02/27/23 161 lb (73 kg)     Body mass index is 29.26 kg/m².     CBC:   Lab Results   Component Value Date/Time    WBC 8.3 02/28/2023 09:43 AM    RBC 4.33 02/28/2023 09:43 AM    HGB 14.2 02/28/2023 09:43 AM    HCT 41.2 02/28/2023 09:43 AM    MCV 95.2 02/28/2023 09:43 AM    RDW 13.1 02/28/2023 09:43 AM     02/28/2023 09:43 AM       CMP:   Lab Results   Component Value Date/Time     02/28/2023 09:43 AM    K 3.8 02/28/2023 09:43 AM     02/28/2023 09:43 AM    CO2 26 02/28/2023 09:43 AM    BUN 6 02/28/2023 09:43 AM    CREATININE 0.63 02/28/2023 09:43 AM    GFRAA >60 08/15/2022 10:23 PM    LABGLOM >60 02/28/2023 09:43 AM    GLUCOSE 101 02/28/2023 09:43 AM    PROT 7.5 09/28/2016 02:10 PM    CALCIUM 9.0 02/28/2023 09:43 AM    BILITOT 0.22 09/28/2016 02:10 PM    ALKPHOS 97 09/28/2016 02:10 PM    AST 14 09/28/2016 02:10 PM    ALT 16 09/28/2016 02:10 PM       POC Tests: No results for input(s): POCGLU, POCNA, POCK, POCCL, POCBUN, POCHEMO, POCHCT in the last 72 hours. Coags: No results found for: PROTIME, INR, APTT    HCG (If Applicable):   Lab Results   Component Value Date    PREGTESTUR negative 08/11/2022    HCG NEGATIVE 11/17/2022    HCGQUANT <1 02/28/2023        ABGs: No results found for: PHART, PO2ART, GDH7KSE, HYG4PWD, BEART, V9CSOUON     Type & Screen (If Applicable):  No results found for: LABABO, LABRH    Drug/Infectious Status (If Applicable):  No results found for: HIV, HEPCAB    COVID-19 Screening (If Applicable): No results found for: COVID19        Anesthesia Evaluation  Patient summary reviewed and Nursing notes reviewed no history of anesthetic complications:   Airway: Mallampati: II  TM distance: >3 FB   Neck ROM: full  Mouth opening: > = 3 FB   Dental: normal exam         Pulmonary:Negative Pulmonary ROS and normal exam  breath sounds clear to auscultation      (-) pneumonia, COPD, asthma, shortness of breath, recent URI, sleep apnea, rhonchi, wheezes, rales, stridor, not a current smoker and no decreased breath sounds          Patient did not smoke on day of surgery.                  Cardiovascular:Negative CV ROS  Exercise tolerance: good (>4 METS),   (+) hyperlipidemia    (-) pacemaker, hypertension, valvular problems/murmurs, past MI, CAD, CABG/stent, dysrhythmias,  angina,  CHF, orthopnea, PND,  GARCIA, murmur, weak pulses,  friction rub, systolic click, carotid bruit,  JVD, peripheral edema and no pulmonary hypertension    ECG reviewed  Rhythm: regular  Rate: normal           Beta Blocker:  Not on Beta Blocker         Neuro/Psych:   (+) psychiatric history: stable with treatmentdepression/anxiety    (-) seizures, neuromuscular disease, TIA, CVA and headaches           GI/Hepatic/Renal: Neg GI/Hepatic/Renal ROS       (-) hiatal hernia, GERD, PUD, hepatitis, liver disease, no renal disease, bowel prep and no morbid obesity       Endo/Other:    (+) hypothyroidism::., no malignancy/cancer. (-) diabetes mellitus, hyperthyroidism, blood dyscrasia, arthritis, no electrolyte abnormalities, no malignancy/cancer               Abdominal:             Vascular: negative vascular ROS. - PVD, DVT and PE. Other Findings:           Anesthesia Plan      spinal and general     ASA 2       Induction: intravenous. MIPS: Postoperative opioids intended and Prophylactic antiemetics administered. Anesthetic plan and risks discussed with patient. Plan discussed with CRNA.                     Jaime Hernandez MD   3/6/2023

## 2023-03-06 NOTE — INTERVAL H&P NOTE
PRE OP NOTE  Gyn Surgery H&P Update    [facility]  Gynecologic Surgery  PHYSICIAN PRE-OPERATIVE NOTE:      Patient Name: Piter Zuniga  Patient : 1990  Room/Bed: [facility] OR Pool/NONE  Admission Date/Time: 3/6/2023  6:07 AM  Primary Care Physician: BRUCE Barillas NP  MRN #: [id number]  [facility] #: [id number]        Date: 3/6/2023  Time: 6:49 AM    The patient was seen in pre-op holding. The procedure risks and complications were reviewed. The labs, Consent, and H&P were reviewed and updated. The patient was counseled on the possibility of  the need of a second surgery. The patient voiced understanding and had all of her questions answered. The possibility of incomplete removal of abnormal tissue was discussed. The Patient is undergoing a laparotomy for removal of myoma and fulguration of implants.       Past Medical History:   Diagnosis Date    Anxiety     ON RX    Endometriosis     Hyperlipidemia     BORDERLINE NO MEDS    Kidney stones -2016    X 6-PASSED ALL ON OWN    Thyroid disease     hypothyroid    URI (upper respiratory infection) 10/2016    WAS ON ATB, INHALER ORAL COUGH MED, RESOLVED NOW       Patient Active Problem List    Diagnosis Date Noted    Pelvic pain 2022     Priority: High    Laparoscopy Operative with FOI of Stage 3 endometriosis and Chromopertubation-Patent 2022     Priority: High    Atypical squamous cells of undetermined significance on cytologic smear of cervix (ASC-US) + HRHPV Other 2021     Priority: High     Overview Note:     2022 Colposcopy with ECC completed  2023 PAP (if colposcopy in  is negative)      Subserous leiomyoma of uterus-Pedunculated posteriorly 2021    Endometriosis Stage 3 by photos 2020     Overview Note:     Pt was in test pool for [place] with Success      Hypothyroidism 2020    Depression 2020     Overview Note:     On Clonopin      Acne vulgaris 2016    Family planning 09/28/2016     Overview Note:     Updating deleted diagnoses      Menorrhagia with regular cycle 09/28/2016    Anxiety          Past Surgical History:   Procedure Laterality Date    ABDOMINAL EXPLORATION SURGERY  11/15/2016    D&C, chromotubation, fulgration    DILATION AND CURETTAGE  November 2016    LAPAROSCOPY N/A 11/17/2022    OPERATIVE LAPARASCOPY  WITH FULGURATION OF IMPLANTS   AND CHROMOTUBATION performed by Braydon Germain DO at [address] Right 11/15/2016    biposy of cyst       Family History   Problem Relation Age of Onset    High Blood Pressure Mother     Diabetes Mother         Type 2    Asthma Mother     Kidney Disease Father         KIDNEY STONES    Heart Failure Maternal Grandmother     Breast Cancer Paternal Grandmother     Other Paternal Grandmother         endometriosis    Asthma Brother     Heart Surgery Paternal Grandfather     Stroke Paternal Grandfather        Social History     Socioeconomic History    Marital status:      Spouse name: Not on file    Number of children: Not on file    Years of education: Not on file    Highest education level: Not on file   Occupational History    Not on file   Tobacco Use    Smoking status: Light Smoker     Packs/day: 0.25     Years: 13.00     Pack years: 3.25     Types: Cigarettes     Start date: 8/1/2008    Smokeless tobacco: Never    Tobacco comments:     I have been reducing cigarettes over the past two months. I am currently down to 5 a day.    Vaping Use    Vaping Use: Never used   Substance and Sexual Activity    Alcohol use: Not Currently     Comment: very rare    Drug use: No    Sexual activity: Yes     Partners: Male     Comment:    Other Topics Concern    Not on file   Social History Narrative    Not on file     Social Determinants of Health     Financial Resource Strain: Not on file   Food Insecurity: Not on file   Transportation Needs: Not on file   Physical Activity: Not on file   Stress: Not on file   Social Connections: Not on file   Intimate Partner Violence: Not on file   Housing Stability: Not on file         No current facility-administered medications on file prior to encounter. Current Outpatient Medications on File Prior to Encounter   Medication Sig Dispense Refill    acetaminophen (TYLENOL) 500 MG tablet Take 2 tablets by mouth every 8 (eight) hours 60 tablet 1    NP THYROID 90 MG tablet TAKE 1 TABLET BY MOUTH EVERY DAY      vitamin D (CHOLECALCIFEROL) 125 MCG (5000 UT) CAPS capsule Take 5,000 Units by mouth daily      clonazePAM (KLONOPIN) 0.5 MG tablet Take 0.5 mg by mouth nightly as needed for Anxiety. Takes 0.5 tab  1    MAGNESIUM PO Take 250 mg by mouth daily        Current Facility-Administered Medications   Medication Dose Route Frequency Provider Last Rate Last Admin    lidocaine PF 1 % injection 1 mL  1 mL IntraDERmal Once PRN Abigail Goins MD        acetaminophen (TYLENOL) tablet 1,000 mg  1,000 mg Oral Once Abigail Goins MD        gabapentin (NEURONTIN) capsule 300 mg  300 mg Oral Once Abigail Goins MD        lactated ringers IV soln infusion   IntraVENous Continuous Augustina Allyn, DO        sodium chloride flush 0.9 % injection 5-40 mL  5-40 mL IntraVENous 2 times per day Augustina Gaston, DO        sodium chloride flush 0.9 % injection 5-40 mL  5-40 mL IntraVENous PRN Augustina Gaston, DO        0.9 % sodium chloride infusion   IntraVENous PRN Augustina Allyn, DO        ceFAZolin (ANCEF) 2000 mg in 0.9% sodium chloride 50 mL IVPB  2,000 mg IntraVENous On Call to [address], DO               Allergies as of 01/23/2023 - Fully Reviewed 12/16/2022   Allergen Reaction Noted    Ibuprofen Other (See Comments) 11/08/2016         REVIEW OF SYSTEMS (11 POINT):  Completed and unchanged from H&P completed less than 30 days ago. See EMR.       Vitals:    03/06/23 0639   BP: (!) 113/54   Pulse: 82   Resp: 18   Temp: 97.3 °F (36.3 °C)   TempSrc: Infrared   SpO2: 96%   Weight: 160 lb (72.6 kg)   Height: 5' 2\" (1.575 m)       PE: Unchanged from H&P    Diagnosis:  1. Subserous leiomyoma of uterus-Pedunculated posteriorly    2. Endometriosis    3. Laparoscopy Operative with FOI of Stage 3 endometriosis and Chromopertubation-Patent 11/17/2022          Procedure Planned:  Exploratory Laparotomy  Resection of Large pedunculated Myoma  Completed 6 months of Lupron  Declined MIS approach RBA recovery reviewed in detail            Hospital Outpatient Visit on 02/28/2023   Component Date Value Ref Range Status    WBC 02/28/2023 8.3  3.5 - 11.0 k/uL Final    RBC 02/28/2023 4.33  4.0 - 5.2 m/uL Final    Hemoglobin 02/28/2023 14.2  12.0 - 16.0 g/dL Final    Hematocrit 02/28/2023 41.2  36 - 46 % Final    MCV 02/28/2023 95.2  80 - 100 fL Final    MCH 02/28/2023 32.8  26 - 34 pg Final    MCHC 02/28/2023 34.5  31 - 37 g/dL Final    RDW 02/28/2023 13.1  11.5 - 14.9 % Final    Platelets 85/60/3649 335  150 - 450 k/uL Final    MPV 02/28/2023 7.6  6.0 - 12.0 fL Final    Seg Neutrophils 02/28/2023 64  36 - 66 % Final    Lymphocytes 02/28/2023 29  24 - 44 % Final    Monocytes 02/28/2023 3  1 - 7 % Final    Eosinophils % 02/28/2023 3  0 - 4 % Final    Basophils 02/28/2023 1  0 - 2 % Final    Segs Absolute 02/28/2023 5.40  1.3 - 9.1 k/uL Final    Absolute Lymph # 02/28/2023 2.40  1.0 - 4.8 k/uL Final    Absolute Mono # 02/28/2023 0.30  0.1 - 1.3 k/uL Final    Absolute Eos # 02/28/2023 0.20  0.0 - 0.4 k/uL Final    Basophils Absolute 02/28/2023 0.00  0.0 - 0.2 k/uL Final    Glucose 02/28/2023 101 (A)  70 - 99 mg/dL Final    BUN 02/28/2023 6  6 - 20 mg/dL Final    Creatinine 02/28/2023 0.63  0.50 - 0.90 mg/dL Final    Est, Glom Filt Rate 02/28/2023 >60  >60 mL/min/1.73m2 Final    Comment:       These results are not intended for use in patients <25years of age. eGFR results are calculated without a race factor using the 2021 CKD-EPI equation.   Careful clinical correlation is recommended, particularly when comparing to results   calculated using previous equations. The CKD-EPI equation is less accurate in patients with extremes of muscle mass, extra-renal   metabolism of creatine, excessive creatine ingestion, or following therapy that affects   renal tubular secretion. Calcium 02/28/2023 9.0  8.6 - 10.4 mg/dL Final    Sodium 02/28/2023 137  135 - 144 mmol/L Final    Potassium 02/28/2023 3.8  3.7 - 5.3 mmol/L Final    Chloride 02/28/2023 100  98 - 107 mmol/L Final    CO2 02/28/2023 26  20 - 31 mmol/L Final    Anion Gap 02/28/2023 11  9 - 17 mmol/L Final    hCG Quant 02/28/2023 <1  <5 mIU/mL Final    Comment:    Non-preg premeno   <=5  Postmeno           <=8  Male               <=3  If HCG results do not concur with clinical observations, additional testing to confirm   results is recommended. Color, UA 02/28/2023 Yellow  Yellow Final    Turbidity UA 02/28/2023 Clear  Clear Final    Glucose, Ur 02/28/2023 NEGATIVE  NEGATIVE Final    Bilirubin Urine 02/28/2023 NEGATIVE  NEGATIVE Final    Ketones, Urine 02/28/2023 NEGATIVE  NEGATIVE Final    Specific Gravity, UA 02/28/2023 1.007  1.000 - 1.030 Final    Urine Hgb 02/28/2023 NEGATIVE  NEGATIVE Final    pH, UA 02/28/2023 7.5  5.0 - 8.0 Final    Protein, UA 02/28/2023 NEGATIVE  NEGATIVE Final    Urobilinogen, Urine 02/28/2023 Normal  Normal Final    Nitrite, Urine 02/28/2023 NEGATIVE  NEGATIVE Final    Leukocyte Esterase, Urine 02/28/2023 NEGATIVE  NEGATIVE Final    Urinalysis Comments 02/28/2023 Microscopic exam not performed based on chemical results unless requested in original order. Final    Specimen Description 02/28/2023 . CLEAN CATCH URINE   Final    Culture 02/28/2023 NO SIGNIFICANT GROWTH   Final    Expiration Date 02/28/2023 03/09/2023,2359   Final    Arm Band Number 02/28/2023 [id number]   Final    ABO/Rh 02/28/2023 A POSITIVE   Final    Antibody Screen 02/28/2023 NEGATIVE   Final   ]  Results for orders placed or performed during the hospital encounter of 02/28/23   Urine culture    Specimen: Urine, clean catch   Result Value Ref Range    Specimen Description . CLEAN CATCH URINE     Culture NO SIGNIFICANT GROWTH    CBC auto differential   Result Value Ref Range    WBC 8.3 3.5 - 11.0 k/uL    RBC 4.33 4.0 - 5.2 m/uL    Hemoglobin 14.2 12.0 - 16.0 g/dL    Hematocrit 41.2 36 - 46 %    MCV 95.2 80 - 100 fL    MCH 32.8 26 - 34 pg    MCHC 34.5 31 - 37 g/dL    RDW 13.1 11.5 - 14.9 %    Platelets 732 451 - 621 k/uL    MPV 7.6 6.0 - 12.0 fL    Seg Neutrophils 64 36 - 66 %    Lymphocytes 29 24 - 44 %    Monocytes 3 1 - 7 %    Eosinophils % 3 0 - 4 %    Basophils 1 0 - 2 %    Segs Absolute 5.40 1.3 - 9.1 k/uL    Absolute Lymph # 2.40 1.0 - 4.8 k/uL    Absolute Mono # 0.30 0.1 - 1.3 k/uL    Absolute Eos # 0.20 0.0 - 0.4 k/uL    Basophils Absolute 0.00 0.0 - 0.2 k/uL   Basic Metabolic Panel w/ Reflex to MG   Result Value Ref Range    Glucose 101 (H) 70 - 99 mg/dL    BUN 6 6 - 20 mg/dL    Creatinine 0.63 0.50 - 0.90 mg/dL    Est, Glom Filt Rate >60 >60 mL/min/1.73m2    Calcium 9.0 8.6 - 10.4 mg/dL    Sodium 137 135 - 144 mmol/L    Potassium 3.8 3.7 - 5.3 mmol/L    Chloride 100 98 - 107 mmol/L    CO2 26 20 - 31 mmol/L    Anion Gap 11 9 - 17 mmol/L   Serum pregnancy (quantitative)   Result Value Ref Range    hCG Quant <1 <5 mIU/mL   Urinalysis   Result Value Ref Range    Color, UA Yellow Yellow    Turbidity UA Clear Clear    Glucose, Ur NEGATIVE NEGATIVE    Bilirubin Urine NEGATIVE NEGATIVE    Ketones, Urine NEGATIVE NEGATIVE    Specific Gravity, UA 1.007 1.000 - 1.030    Urine Hgb NEGATIVE NEGATIVE    pH, UA 7.5 5.0 - 8.0    Protein, UA NEGATIVE NEGATIVE    Urobilinogen, Urine Normal Normal    Nitrite, Urine NEGATIVE NEGATIVE    Leukocyte Esterase, Urine NEGATIVE NEGATIVE    Urinalysis Comments       Microscopic exam not performed based on chemical results unless requested in original order.    TYPE AND SCREEN   Result Value Ref Range    Expiration Date 03/09/2023,2359     Arm Band Number [id number]     ABO/Rh A POSITIVE     Antibody Screen NEGATIVE            The patient is ready for transport to the operative suite.       Electronically signed by Miguel Ángel Penaloza DO on 3/6/2023 at 6:49 AM

## 2023-03-06 NOTE — ANESTHESIA PROCEDURE NOTES
Spinal Block    Patient location during procedure: OR  End time: 3/6/2023 8:10 AM  Reason for block: post-op pain management  Staffing  Performed: resident/CRNA   Resident/CRNA: BRUCE Tong CRNA  Spinal Block  Patient position: sitting  Prep: Betadine  Patient monitoring: continuous pulse ox, frequent blood pressure checks and oxygen  Approach: midline  Location: L3/L4  Guidance: paresthesia technique  Provider prep: mask and sterile gloves  Local infiltration: lidocaine  Needle  Needle type:  Joseph   Needle gauge: 25 G  Needle length: 3.5 in  Assessment  Sensory level: T6  Swirl obtained: Yes  CSF: clear  Attempts: 1  Hemodynamics: stable  Preanesthetic Checklist  Completed: patient identified, IV checked, site marked, risks and benefits discussed, surgical/procedural consents, equipment checked, pre-op evaluation, timeout performed, anesthesia consent given, oxygen available, monitors applied/VS acknowledged, fire risk safety assessment completed and verbalized and blood product R/B/A discussed and consented

## 2023-03-06 NOTE — PROGRESS NOTES
Gynecology Progress Note    Date: 3/7/2023  Time: 7:43 AM    Rahel Pittman 32 y.o. female , POD # 1 s/p exploratory laparotomy, myomectomy, fulguration of endometriosis on 3/6/23    Patient seen and examined. She had no complaints or concerns. Pain is controlled.  Patient is  tolerating oral intake. She is urinating well.  She denies any vaginal bleeding. She is  ambulating without difficulty.  She is passing flatus. She denies Fever/Chills, Chest Pain, SOB, N/V, Calf Pain    Vitals:  Vitals:    23 0110 23 0602 23 0632 23 0635   BP:    111/70   Pulse:    54   Resp: 14 16 16 17   Temp:    97.7 °F (36.5 °C)   TempSrc:       SpO2:    97%   Weight:       Height:             Intake/Output:   Last Shift: I/O last 3 completed shifts:  In:  [I.V.:1150; IV Piggyback:50]  Out:  [Urine:2000; Blood:5]  Current Shift: No intake/output data recorded.  1850 mL out in last 12 hrs ~ 154mL/hr      Physical Exam:  General:  no apparent distress, alert, and cooperative  Neurologic:  alert, oriented, normal speech, no focal findings or movement disorder noted  Lungs:  No increased work of breathing, good air exchange, clear to auscultation bilaterally, no crackles or wheezing  Heart:  regular rate and rhythm and no murmur    Abdomen: soft, non-distended, appropriate tenderness, normal bowel sounds; no rebound, rigidity, guarding, no CVA tenderness  Incision: Silver dressing in place with no saturation  Extremities:  no calf tenderness, non edematous, SCDs on and functioning      Assessment/Plan:  Rahel Pittman 32 y.o. female , POD # 1 s/p exploratory laparotomy, myomectomy, fulguration of endometriosis on 3/6/23   - Doing well, vitals stable   - S/p haque catheter, UOP adequate   - Continue IVF   - Encourage ambulation and use of incentive spirometer   - Pain control: Jenny/Toradol/Tylenol   - Labs: H&H 3/6 stable   - DVT Proph: Lovenox 40mg qd and SCDs   - Abx: Ancef x24 hours   -  Diet: Regular   - Path: Pending   - Continue post-op care, please page with any questions. Patient meeting postop milestones, will discuss discharge with Dr. Jillian Bonner.     Skylar Tam DO  Ob/Gyn Resident    3/7/2023, 7:43 AM

## 2023-03-06 NOTE — H&P
HISTORY & PHYSICAL PRE-OP UPDATE NOTE    Gynecologic Surgery Pre-Operative Attestation/update Note:  23       Facility: [address] [place] [address]  Date: 3/6/2023  Time: 7:50 AM      Patient Name: Jaki Muir  Patient : 1990  Room/Bed: [facility] OR Pool/NONE  Admission Date/Time: 3/6/2023  6:07 AM  MRN #: [id number]  [facility] #: [id number]    Past Medical History:   Diagnosis Date    Anxiety     ON RX    Endometriosis     Hyperlipidemia     BORDERLINE NO MEDS    Kidney stones -2016    X 6-PASSED ALL ON OWN    Thyroid disease     hypothyroid    URI (upper respiratory infection) 10/2016    WAS ON ATB, INHALER ORAL COUGH MED, RESOLVED NOW         Past Surgical History:   Procedure Laterality Date    ABDOMINAL EXPLORATION SURGERY  11/15/2016    D&C, chromotubation, fulgration    DILATION AND CURETTAGE  2016    LAPAROSCOPY N/A 2022    OPERATIVE LAPARASCOPY  WITH FULGURATION OF IMPLANTS   AND CHROMOTUBATION performed by Troy Velasquez DO at [address] [facility] Right 11/15/2016    biposy of cyst         No current facility-administered medications on file prior to encounter. Current Outpatient Medications on File Prior to Encounter   Medication Sig Dispense Refill    NP THYROID 90 MG tablet TAKE 1 TABLET BY MOUTH EVERY DAY      vitamin D (CHOLECALCIFEROL) 125 MCG (5000 UT) CAPS capsule Take 5,000 Units by mouth daily      clonazePAM (KLONOPIN) 0.5 MG tablet Take 0.5 mg by mouth nightly as needed for Anxiety. Takes 0.5 tab  1    MAGNESIUM PO Take 250 mg by mouth daily            Allergies as of 2023 - Fully Reviewed 2022   Allergen Reaction Noted    Ibuprofen Other (See Comments) 2016         REVIEW OF SYSTEMS (11 POINT):  Completed and unchanged from H&P completed less than 30 days ago. See EMR. Attending Physician Statement  I have discussed the care of Jaki Muir, including pertinent history and exam findings,  with the resident. The physical exam at the patient's workup outpatient appointment is unchanged. I have reviewed their note in the electronic medical record. I have seen and examined the patient in the pre-op holding area and the key elements of all parts of the encounter have been performed/reviewed by me . This was completed more than 15 minutes prior to the scheduled surgical start time per the new start time policy. I agree with the assessment, plan and orders as documented by the resident. The procedure risks and complications were discussed as well as the possibility of the need for a second surgery and incomplete removal of abnormal tissue. The consents were identified on the chart and reviewed with the patient with possible risks of the procedure.         Vitals:    03/06/23 0639   BP: (!) 113/54   Pulse: 82   Resp: 18   Temp: 97.3 °F (36.3 °C)   TempSrc: Infrared   SpO2: 96%   Weight: 160 lb (72.6 kg)   Height: 5' 2\" (1.575 m)         Hospital Outpatient Visit on 02/28/2023   Component Date Value Ref Range Status    WBC 02/28/2023 8.3  3.5 - 11.0 k/uL Final    RBC 02/28/2023 4.33  4.0 - 5.2 m/uL Final    Hemoglobin 02/28/2023 14.2  12.0 - 16.0 g/dL Final    Hematocrit 02/28/2023 41.2  36 - 46 % Final    MCV 02/28/2023 95.2  80 - 100 fL Final    MCH 02/28/2023 32.8  26 - 34 pg Final    MCHC 02/28/2023 34.5  31 - 37 g/dL Final    RDW 02/28/2023 13.1  11.5 - 14.9 % Final    Platelets 93/89/3715 335  150 - 450 k/uL Final    MPV 02/28/2023 7.6  6.0 - 12.0 fL Final    Seg Neutrophils 02/28/2023 64  36 - 66 % Final    Lymphocytes 02/28/2023 29  24 - 44 % Final    Monocytes 02/28/2023 3  1 - 7 % Final    Eosinophils % 02/28/2023 3  0 - 4 % Final    Basophils 02/28/2023 1  0 - 2 % Final    Segs Absolute 02/28/2023 5.40  1.3 - 9.1 k/uL Final    Absolute Lymph # 02/28/2023 2.40  1.0 - 4.8 k/uL Final    Absolute Mono # 02/28/2023 0.30  0.1 - 1.3 k/uL Final    Absolute Eos # 02/28/2023 0.20  0.0 - 0.4 k/uL Final    Basophils Absolute 02/28/2023 0.00  0.0 - 0.2 k/uL Final    Glucose 02/28/2023 101 (A)  70 - 99 mg/dL Final    BUN 02/28/2023 6  6 - 20 mg/dL Final    Creatinine 02/28/2023 0.63  0.50 - 0.90 mg/dL Final    Est, Glom Filt Rate 02/28/2023 >60  >60 mL/min/1.73m2 Final    Comment:       These results are not intended for use in patients <25years of age. eGFR results are calculated without a race factor using the 2021 CKD-EPI equation. Careful clinical correlation is recommended, particularly when comparing to results   calculated using previous equations. The CKD-EPI equation is less accurate in patients with extremes of muscle mass, extra-renal   metabolism of creatine, excessive creatine ingestion, or following therapy that affects   renal tubular secretion. Calcium 02/28/2023 9.0  8.6 - 10.4 mg/dL Final    Sodium 02/28/2023 137  135 - 144 mmol/L Final    Potassium 02/28/2023 3.8  3.7 - 5.3 mmol/L Final    Chloride 02/28/2023 100  98 - 107 mmol/L Final    CO2 02/28/2023 26  20 - 31 mmol/L Final    Anion Gap 02/28/2023 11  9 - 17 mmol/L Final    hCG Quant 02/28/2023 <1  <5 mIU/mL Final    Comment:    Non-preg premeno   <=5  Postmeno           <=8  Male               <=3  If HCG results do not concur with clinical observations, additional testing to confirm   results is recommended.       Color, UA 02/28/2023 Yellow  Yellow Final    Turbidity UA 02/28/2023 Clear  Clear Final    Glucose, Ur 02/28/2023 NEGATIVE  NEGATIVE Final    Bilirubin Urine 02/28/2023 NEGATIVE  NEGATIVE Final    Ketones, Urine 02/28/2023 NEGATIVE  NEGATIVE Final    Specific Gravity, UA 02/28/2023 1.007  1.000 - 1.030 Final    Urine Hgb 02/28/2023 NEGATIVE  NEGATIVE Final    pH, UA 02/28/2023 7.5  5.0 - 8.0 Final    Protein, UA 02/28/2023 NEGATIVE  NEGATIVE Final    Urobilinogen, Urine 02/28/2023 Normal  Normal Final    Nitrite, Urine 02/28/2023 NEGATIVE  NEGATIVE Final    Leukocyte Esterase, Urine 02/28/2023 NEGATIVE  NEGATIVE Final    Urinalysis Comments 02/28/2023 Microscopic exam not performed based on chemical results unless requested in original order. Final    Specimen Description 02/28/2023 . CLEAN CATCH URINE   Final    Culture 02/28/2023 NO SIGNIFICANT GROWTH   Final    Expiration Date 02/28/2023 03/09/2023,2359   Final    Arm Band Number 02/28/2023 [id number]   Final    ABO/Rh 02/28/2023 A POSITIVE   Final    Antibody Screen 02/28/2023 NEGATIVE   Final   ]        The patient was counseled at length about the risks of avtar Covid-19 in the niecy-operative and post-operative states including the recovery window of their procedure. The patient was made aware that avtar Covid-19 after a surgical procedure may worsen their prognosis for recovering from the virus and lend to a higher morbidity and or mortality risk. The patient was given the options of postponing their procedure. All of the risks, benefits, and alternatives were discussed. The patient  does wish to proceed with the procedure. Assessment:       Diagnosis Orders   1. Subserous leiomyoma of uterus-Pedunculated posteriorly          2. Endometriosis          3.  Laparoscopy Operative with FOI of Stage 3 endometriosis and Chromopertubation-Patent 11/17/2022                      Patient Active Problem List     Diagnosis Date Noted    Pelvic pain 11/17/2022       Priority: High    Laparoscopy Operative with FOI of Stage 3 endometriosis and Chromopertubation-Patent 11/17/2022 11/17/2022       Priority: High    Atypical squamous cells of undetermined significance on cytologic smear of cervix (ASC-US) + HRHPV Other 07/13/2021       Priority: High       Overview Note:       5/5/2022 Colposcopy with ECC completed  5/2023 PAP (if colposcopy in 2022 is negative)       Subserous leiomyoma of uterus-Pedunculated posteriorly 02/05/2021       Priority: High    Endometriosis Stage 3 by photos 12/30/2020       Priority: High       Overview Note:       Pt was in test pool for [place] with Success Menorrhagia with regular cycle 09/28/2016       Priority: High    Hypothyroidism 12/30/2020       Priority: Medium    Depression 12/30/2020       Priority: Medium       Overview Note:       On Clonopin       Acne vulgaris 09/28/2016    Family planning 09/28/2016       Overview Note:       Updating deleted diagnoses       Anxiety              Permanent Sterilization Completed: No      Plan:  Exploratory Laparotomy  Resection of Large pedunculated Myoma  Completed 6 months of Lupron  Declined MIS approach RBA recovery reviewed in detail  No orders of the defined types were placed in this encounter. Counseling: The patient was counseled on all options both medical and surgical, conservative as well as definitive. She has elected to proceed with the procedure as stated above. The patient was counseled on the procedure. Risks and complications were reviewed in detail. The patients orders, labs, consents have been completed. The history and physical as well as all supporting surgical documentation will be forwarded to the pre-operative holding area. The patient is aware that this procedure may not alleviate her symptoms. That there may be a necessity for a second surgery and that there may be an incomplete removal of abnormal tissue. Home care, Restrictions & Follow up Care review completed    Planned Office Follow up was reviewed with the patient and her family and is for  2 weeks. The patient will call to make this appointment unless she already has done so.       Electronically signed by Eun Nice DO on 3/6/2023 at 7:50 AM

## 2023-03-06 NOTE — OP NOTE
Gynecologic Operative Note:      NAME: Piter Zuniga   MRN: [id number]  CSN: [id number]  : 1990    PROCEDURE DATE: 3/6/2023     Pre-op Diagnosis: Large Pedunculated Fibroid; PELVIC PAIN  ENDOMETRIOSIS Stage 3     Post-op Diagnosis: Same; Large Pedunculated Fundal/Posterior Fibroid left lateral; Endometriosis right ovary vesicular only    Procedure: Procedure(s):  LAPAROTOMY OPEN VIA PFANNENSTIEL FOR REMOVAL OF MYOMA FULGERATION OF IMPLANTS    Surgeon: Kiran Quezada DO    Assistant:   1. Augustina Seip, DO (see below for indication for surgical assist)  2. [id number] [facility] PGY 3    Circulator Documentation of Staff:  Surgeon(s) and Role:     * Kiran Quezada DO - Primary     * Augustina Seip, DO    OR Staff:  Scrub Person First: Lorena Sumner      Anesthesia Type: General  Anesthesia Staff: Anesthesiologist: Tristian Barrera MD  CRNA: BRUCE Moya CRNA      Complications: None      Estimated Blood Loss: less than 5 ml  Total IV Fluids:  800 ml  Urine Output: 150 ml clear      Specimens:   ID Type Source Tests Collected by Time Destination   1 : URINE SPECIMEN FROM INITIAL GARIBAY INSERTION Urine Urine, indwelling catheter URINALYSIS WITH REFLEX TO [id number],  3/6/2023 0830    A : SUSPECTED PEDUNCULATED FIBROID Tissue Uterus SURGICAL PATHOLOGY Kiran Quezada DO 3/6/2023 0851      Implants: * No implants in log *    Drains: * No LDAs found *      Condition: Stable    Findings: Small uterus with ~4-6 cm pedunculated fibroid that appeared to be degenerating. Location was left fundal/posterior. It was on a stalk that was ~2-3 cm in diameter. Fallopian tubes grossly normal. RRight ovary with vesicular endometriosis fulgurated. Left ovary grossly normal. Cul-de-sac without abnormalities and the uterosacrals were without nodularity. Urine was clear throughout the case. No upper abdomen pathology as appreciated. No hernia at prior laparoscopic incision. The uterine cavity was never entered. The myometrium was left undisturbed. Description of Procedure: (Understanding of limitations from template op-reports exist)  The patient was seen in the pre-operative area. The procedure risk and complications were reviewed. The consent , labs , and H&P were reviewed. The patient had all of her questions answered. The patient was moved to the operative suite where she was placed under general anesthesia by the anesthesiologist.  The patient was then timed out. A spinal anesthetic was given prior to her general in the seated position. The patient  had a sterile prep and drape with EPC's in place, SCIP protocol antibiotics were infused, and a haque catheter was draining clear yellow urine. A small Pfannenstiel skin incision was made two fingers breath above the pubic arch. This was carried down to the fascia with bovie cautery. The fascia was taken laterally and elevated off the underlying musculature with bovie cautery and gentle blunt dissection. The midline structures were identified and blunt dissection was utilized to enter the peritoneal cavity. Care was taken to not  involve the bowel or the bladder. The upper abdomen and lower pelvis was inspected and explored. There was no paraaortic or ileac lymphadenopathy. The liver was smooth and there was no peritoneal studding. The ureters were traced bilaterally. The bowel was gently moved out of the surgical field and a small ribbon was used to protect the cephalad structures. The uterus was brought up into the surgical field and the fibroid was identified. The bilateral ovaries and cul-de-sac inspected. Cautery was completed on the right ovary for vesicular lesions of endometriosis. The palpated cul-de-sac was otherwise grossly normal. The fibroid had two \"O\" vicryl endo-loops placed across its base and a Hodan stitch proximal of 2-0 vicryl on a CT-2. Excellent hemostasis was appreciated.  The stalk was then transected at its base with bovie cautery. Hemostasis continued. Irrigation was completed. All instruments were removed. The bowel was returned to the normal anatomic position. Sponge needle and instrument counts were called for and found to be correct.    GLOVES WERE CHANGED. A fresh closure kit was introduced and the other instruments were removed off the field. The peritoneum was closed with 2-0 vicryl suture. The fascia was closed with two \"0\" Vicryl sutures with figure of \"8\"'s at the corners and a running non-locking technique to the middle. The same process was completed on the contralateral side.  The fascia was inspected and was intact without defects. Copious irrigation and aspiration of of the subcuticular space was completed. The skin was closed with 4-0 Monocryl in a subcuticular fashion. A silver dressing was applied.  Repeat sponge needle and instrument counts were called for and found to be correct.  The patient was brought out of anesthesia and moved to PACU. She will be admitted to the GYN Service. SCIP abx to continue. EPC's and Lovenox for thromboembolic prophylaxis ordered.        Surgical Assistant documentation:  The assistant surgeon was present to assist in the surgery and to give adequate visualization and intervene with occasional clamping and suturing so the procedure could be completed in a safe manner with limited risks to the patient. The patients co-morbidities identified in the History & Physical required the need for additional trained personnel to complete this procedure. The assistant surgeon allowed for a resident, (PGY 3), that was learning the procedure or fine tuning their surgical skills to perform more independently as the assistant surgeon was on their side of the table and could direct them when the case depth limited the primary surgeons ability to rodriguez them safely.      Body mass index is 29.26 kg/m². (Negative pressure wound Vac Applied if > 35) or a large pannus is  present  Negative Pressure Wound Vac Applied No:     Disposition:  The patient will return to my office in 2 weeks. We will review her pathology report and any further recommendations. She was counseled with her family that she is to report any temperature more than 100.4 F, pelvic pain, or heavy vaginal bleeding; She is to refrain from intercourse douching or tampons; No hot tubs baths lakes or pools. No driving. The patient voiced understanding of the above counseling.           Shari Xavier DO    Electronically signed by Shari Xavier DO on 3/6/23 at 9:02 AM EST

## 2023-03-06 NOTE — DISCHARGE INSTRUCTIONS
Discharge Instructions for Exploratory Laparotomy    Depending on your surgery, the hospital stay varies from 1-5 days. Recovery can take 6-8 weeks. What You Will Need   Dressing supplies   Steps to [address] your doctor about when it is safe to shower, bathe, or soak in water. You may be advised to shower instead of taking a bath for the first two weeks after surgery. Keep your incision sites clean and dry. Wash your hands before changing the dressing. Do not douche or put anything in your vagina, such as a tampon, until your doctor tells you otherwise. Diet    While in the hospital, you will progress from intravenous fluid to a normal diet. If recommended by your doctor, eat a high-fiber diet to promote healing and prevent constipation . Drink plenty of fluids. Physical Activity    Ask your doctor when you will be able to return to work and drive. Return to your normal activities gradually. Most normal activities, including sex, can be resumed in about six weeks. Take daily walks as tolerated. Avoid heavy lifting and strenuous exercise until your doctor gives you permission to do so. Ask your doctor when and how to perform Kegel exercises . These exercises can strengthen the muscles of the pelvic floor and prevent or improve urinary incontinence , as well as enhance sexual pleasure. Medications    If you had to stop medicines before the procedure, ask your doctor when you can start again. Medicines often stopped include:   Anti-inflammatory drugs (eg, aspirin )   Blood thinners, like clopidogrel (Plavix) or warfarin (Coumadin)   You will likely go home with a prescription for pain medicine. If you had your ovaries removed with your uterus, you may be given an estrogen supplement. Also, to prevent constipation, your doctor may recommend a stool softener. If you are taking medicines, follow these general guidelines:   Take your medicine as directed.  Do not change the amount or the schedule. Do not stop taking them without talking to your doctor. Do not share them. Know what the results and side effects. Report them to your doctor. Some drugs can be dangerous when mixed. Talk to a doctor or pharmacist if you are taking more than one drug. This includes over-the-counter medicine and herb or dietary supplements. Plan ahead for refills so you do not run out. Lifestyle Changes    You and your doctor will plan lifestyle changes that will aid in your recovery. Some issues that may affect you include: If you experience a sense of loss or feel depressed after your surgery, it is important to talk to your doctor about these feelings since they can be treated. Follow-up   Schedule a follow-up appointment as directed by your doctor. If you had this procedure because of cancer, other treatment, such as radiation or hormonal therapy, may be used as well. Call Your Doctor If Any of the Following Occurs   It is important for you to monitor your recovery once you leave the hospital. That way, you can alert your doctor to any problems immediately. If any of the following occurs, call your doctor:   Signs of infection, including fever and chills   Redness, swelling, increasing pain, excessive bleeding, leakage, or any discharge from the incision site   Incision opens up   Nausea and/or vomiting that you cannot control with the medicines you were given after surgery, or which persist for more than two days after discharge from the hospital   Dizziness or fainting   Cough, shortness of breath, or chest pain   Heavy bleeding   Pain that you cannot control with the medicines you have been given   Pain, burning, urgency or frequency of urination, or persistent bleeding in the urine   Swelling, redness, or pain in your leg   In case of an emergency,  CALL 911  immediately.

## 2023-03-06 NOTE — PLAN OF CARE
Problem: Discharge Planning  Goal: Discharge to home or other facility with appropriate resources  Outcome: Progressing  Flowsheets (Taken 3/6/2023 1051)  Discharge to home or other facility with appropriate resources:   Identify barriers to discharge with patient and caregiver   Identify discharge learning needs (meds, wound care, etc)   Refer to discharge planning if patient needs post-hospital services based on physician order or complex needs related to functional status, cognitive ability or social support system   Arrange for interpreters to assist at discharge as needed   Arrange for needed discharge resources and transportation as appropriate     Problem: Pain  Goal: Verbalizes/displays adequate comfort level or baseline comfort level  Outcome: Progressing     Problem: Safety - Adult  Goal: Free from fall injury  Outcome: Progressing     Problem: ABCDS Injury Assessment  Goal: Absence of physical injury  Outcome: Progressing

## 2023-03-06 NOTE — ANESTHESIA POSTPROCEDURE EVALUATION
POST- ANESTHESIA EVALUATION       Pt Name: Nahum De Leon  MRN: [id number]  YOB: 1990  Date of evaluation: 3/6/2023  Time:  11:34 AM      BP (!) 102/55   Pulse 51   Temp 97.5 °F (36.4 °C) (Oral)   Resp 15   Ht 5' 2\" (1.575 m)   Wt 160 lb (72.6 kg)   LMP 02/24/2023 (Approximate) Comment: urine pregnancy test negative 3/6/23  SpO2 96%   BMI 29.26 kg/m²      Consciousness Level  Awake  Cardiopulmonary Status  Stable  Pain Adequately Treated YES  Nausea / Vomiting  NO  Adequate Hydration  YES  Anesthesia Related Complications NONE      Electronically signed by Trinity Chowdhury MD on 3/6/2023 at 11:34 AM       Department of Anesthesiology  Postprocedure Note    Patient: Nahum De Leon  MRN: [id number]  YOB: 1990  Date of evaluation: 3/6/2023      Procedure Summary     Date: 03/06/23 Room / Location: [address] [place] [address] Eligio Larios  / [facility]: [facility]    Anesthesia Start: 3480 Anesthesia Stop: 7882    Procedure: EXPLORATORY LAPAROTOMY WITH UTERINE MYOMECTOMY AND FULGURATION OF ENDOMETRIOSIS (Abdomen) Diagnosis:       Pelvic pain      Endometriosis      (PELVIC PAIN)      (ENDOMETRIOSIS)    Surgeons: Caity Cronin DO Responsible Provider: Trinity Chowdhury MD    Anesthesia Type: spinal, general ASA Status: 2          Anesthesia Type: No value filed.     Benny Phase I: Benny Score: 10    Benny Phase II:        Anesthesia Post Evaluation

## 2023-03-07 VITALS
WEIGHT: 160 LBS | RESPIRATION RATE: 18 BRPM | HEART RATE: 54 BPM | BODY MASS INDEX: 29.44 KG/M2 | DIASTOLIC BLOOD PRESSURE: 70 MMHG | OXYGEN SATURATION: 97 % | SYSTOLIC BLOOD PRESSURE: 111 MMHG | HEIGHT: 62 IN | TEMPERATURE: 97.7 F

## 2023-03-07 LAB — SURGICAL PATHOLOGY REPORT: NORMAL

## 2023-03-07 PROCEDURE — 6370000000 HC RX 637 (ALT 250 FOR IP): Performed by: STUDENT IN AN ORGANIZED HEALTH CARE EDUCATION/TRAINING PROGRAM

## 2023-03-07 PROCEDURE — 99024 POSTOP FOLLOW-UP VISIT: CPT | Performed by: OBSTETRICS & GYNECOLOGY

## 2023-03-07 PROCEDURE — 6360000002 HC RX W HCPCS: Performed by: STUDENT IN AN ORGANIZED HEALTH CARE EDUCATION/TRAINING PROGRAM

## 2023-03-07 RX ADMIN — GABAPENTIN 100 MG: 100 CAPSULE ORAL at 08:57

## 2023-03-07 RX ADMIN — LEVOTHYROXINE, LIOTHYRONINE 90 MG: 19; 4.5 TABLET ORAL at 08:57

## 2023-03-07 RX ADMIN — ACETAMINOPHEN 1000 MG: 500 TABLET ORAL at 10:47

## 2023-03-07 RX ADMIN — KETOROLAC TROMETHAMINE 30 MG: 30 INJECTION, SOLUTION INTRAMUSCULAR; INTRAVENOUS at 04:46

## 2023-03-07 RX ADMIN — FAMOTIDINE 20 MG: 20 TABLET, FILM COATED ORAL at 08:57

## 2023-03-07 RX ADMIN — OXYCODONE HYDROCHLORIDE 5 MG: 5 TABLET ORAL at 00:40

## 2023-03-07 RX ADMIN — ENOXAPARIN SODIUM 40 MG: 100 INJECTION SUBCUTANEOUS at 08:57

## 2023-03-07 RX ADMIN — DOCUSATE SODIUM 100 MG: 100 CAPSULE, LIQUID FILLED ORAL at 08:57

## 2023-03-07 RX ADMIN — OXYCODONE HYDROCHLORIDE 10 MG: 5 TABLET ORAL at 16:14

## 2023-03-07 RX ADMIN — OXYCODONE HYDROCHLORIDE 10 MG: 5 TABLET ORAL at 06:02

## 2023-03-07 ASSESSMENT — PAIN DESCRIPTION - LOCATION
LOCATION: ABDOMEN

## 2023-03-07 ASSESSMENT — PAIN DESCRIPTION - ORIENTATION
ORIENTATION: MID;LOWER
ORIENTATION: LOWER
ORIENTATION: MID

## 2023-03-07 ASSESSMENT — PAIN DESCRIPTION - DESCRIPTORS
DESCRIPTORS: ACHING;THROBBING
DESCRIPTORS: ACHING
DESCRIPTORS: ACHING
DESCRIPTORS: ACHING;SHARP
DESCRIPTORS: SORE;TIGHTNESS

## 2023-03-07 ASSESSMENT — PAIN SCALES - GENERAL
PAINLEVEL_OUTOF10: 8
PAINLEVEL_OUTOF10: 9
PAINLEVEL_OUTOF10: 4
PAINLEVEL_OUTOF10: 7
PAINLEVEL_OUTOF10: 7
PAINLEVEL_OUTOF10: 9
PAINLEVEL_OUTOF10: 7
PAINLEVEL_OUTOF10: 10

## 2023-03-07 ASSESSMENT — PAIN - FUNCTIONAL ASSESSMENT
PAIN_FUNCTIONAL_ASSESSMENT: PREVENTS OR INTERFERES SOME ACTIVE ACTIVITIES AND ADLS
PAIN_FUNCTIONAL_ASSESSMENT: ACTIVITIES ARE NOT PREVENTED

## 2023-03-07 ASSESSMENT — PAIN DESCRIPTION - PAIN TYPE: TYPE: SURGICAL PAIN

## 2023-03-07 NOTE — PROGRESS NOTES
Patient has urinated quantity sufficient post indwelling haque catheter removal.  Patient denies n/v. Reports passing flatus. Has tolerated clear liquid diet without complications. Patient reports adequate pain level at this time as well. Patient wondering about being discharged home. Diet advanced to full liquid diet per written order to advance diet as tolerated. Dr. Lon Gallego called to inquiry about discharge. No answer. Message left on secure voicemail. Requested call back to [phone]. Awaiting response.

## 2023-03-07 NOTE — PROGRESS NOTES
Patient and  given all verbal and written discharge instructions. All necessary medication prescriptions sent to patient's preferred pharmacy. All questions answered and understanding expressed by both patient and . IVSL was removed prior to discharge, dresssing applied and bleeding controlled. Patient ambulated out at discharge without difficulty, gait steady, independent with ambulation.

## 2023-03-07 NOTE — PLAN OF CARE
Problem: Discharge Planning  Goal: Discharge to home or other facility with appropriate resources  Outcome: Completed    Problem: Pain  Goal: Verbalizes/displays adequate comfort level or baseline comfort level  Outcome: Completed     Problem: Safety - Adult  Goal: Free from fall injury  Outcome: Completed     Problem: ABCDS Injury Assessment  Goal: Absence of physical injury  Outcome: Completed

## 2023-03-07 NOTE — PROGRESS NOTES
03/06/23 2002   Encounter Summary   Encounter Overview/Reason  Spiritual/Emotional Needs   Service Provided For: Patient   Referral/Consult From: Rounding   Support System Spouse   Last Encounter  03/06/23   Complexity of Encounter Moderate   Spiritual/Emotional needs   Type Spiritual Support   Assessment/Intervention/Outcome   Assessment Calm;Coping;Peaceful   Intervention Active listening;Prayer (assurance of)/[place];Sustaining Presence/Ministry of presence   Outcome Acceptance; Coping;Engaged in conversation;Peace; Receptive

## 2023-03-07 NOTE — CARE COORDINATION
Case Management Assessment  Initial Evaluation    Date/Time of Evaluation: 3/7/2023 9:49 AM  Assessment Completed by: Fortunato Amin RN    If patient is discharged prior to next notation, then this note serves as note for discharge by case management. Patient Name: Pierce Wolf                   YOB: 1990  Diagnosis: Pelvic pain [R10.2]  Endometriosis [N80.9]  Postoperative state [Z98.890]                   Date / Time: 3/6/2023  6:07 AM    Patient Admission Status: Inpatient   Readmission Risk (Low < 19, Mod (19-27), High > 27): Readmission Risk Score: 3.8    Current PCP: BRUCE Simon NP  PCP verified by CM? Yes    Chart Reviewed: Yes      History Provided by: Patient  Patient Orientation: Alert and Oriented    Patient Cognition: Alert    Hospitalization in the last 30 days (Readmission):  No    If yes, Readmission Assessment in CM Navigator will be completed. Advance Directives:      Code Status: Prior   Patient's Primary Decision Maker is: Legal Next of Kin    Primary Decision Maker: Juan F Pittman - Spouse - [phone]    Discharge Planning:    Patient lives with: Spouse/Significant Other Type of Home: House  Primary Care Giver: Self  Patient Support Systems include: Spouse/Significant Other, Family Members   Current Financial resources: None  Current community resources: None  Current services prior to admission: None            Current DME:              Type of Home Care services:  None    ADLS  Prior functional level: Independent in ADLs/IADLs  Current functional level: Independent in ADLs/IADLs    PT AM-PAC:   /24  OT AM-PAC:   /24    Family can provide assistance at DC: Yes  Would you like Case Management to discuss the discharge plan with any other family members/significant others, and if so, who? Yes  Plans to Return to Present Housing: Yes  Other Identified Issues/Barriers to RETURNING to current housing: No barriers.   Potential Assistance needed at discharge: N/A Potential DME:    Patient expects to discharge to: [facility] for transportation at discharge: [address]: Kori Gonzalez / Plan: Kori Gonzalez / Product Type: *No Product type* /     Does insurance require precert for SNF: Yes    Potential assistance Purchasing Medications: No  Meds-to-Beds request:        [facility] [address] [place], [address] [phone] Lintonsophie Moore [phone] [address] [address]  Phone: [phone] Fax: [phone]      Notes:    Factors facilitating achievement of predicted outcomes: Family support, Friend support, Motivated, Cooperative, and Pleasant    Barriers to discharge: No barriers. Additional Case Management Notes: The patient is from home with spouse, independent, drives. DME: None. VNS: Denies. Will follow for needs. The Plan for Transition of Care is related to the following treatment goals of Pelvic pain [R10.2]  Endometriosis [N80.9]  Postoperative state [A27.547]    IF APPLICABLE: The Patient and/or patient representative Rahel and her family were provided with a choice of provider and agrees with the discharge plan. Freedom of choice list with basic dialogue that supports the patient's individualized plan of care/goals and shares the quality data associated with the providers was provided to:     Patient Representative Name:       The Patient and/or Patient Representative Agree with the Discharge Plan?       Eileen Colmenares RN  Case Management Department  Ph: [phone] Fax: [phone]

## 2023-03-07 NOTE — PROGRESS NOTES
Gynecology Rounds    POD# 1  Procedure: LAPAROTOMY OPEN VIA PFANNENSTIEL FOR REMOVAL OF MYOMA FULGERATION OF IMPLANTS       Date: 3/7/2023  Time: 11:03 AM      Patient Name: Alexandro Green  Patient : 1990  Room/Bed:   Admission Date/Time: 3/6/2023  6:07 AM  MRN #: [id number]  [facility] #: [id number]        Attending Physician Statement  I have discussed the care of Alexandro Green, including pertinent history and exam findings,  with the resident. I have reviewed their note in the electronic medical record. I have seen and examined the patient and the key elements of all parts of the encounter have been performed/reviewed by me . I agree with the assessment, plan and orders as documented by the resident. Pt seen & examined. Pt requesting discharge home. Pt to follow up office 2 weeks    Vitals:    23 0635   BP: 111/70   Pulse: 54   Resp: 17   Temp: 97.7 °F (36.5 °C)   SpO2: 97%       Admission on 2023   Component Date Value Ref Range Status    HCG, Pregnancy Urine (POC) 2023 NEGATIVE  NEGATIVE Final    Comment:    HCG screen is sensitive to 25 mIU/mL. However this test may  lower levels  of HCG. If further evaluation is needed  please request quantitative HCG.       Hemoglobin 2023 13.4  12.0 - 16.0 g/dL Final    Hematocrit 2023 38.9  36 - 46 % Final    Color, UA 2023 Yellow  Yellow Final    Turbidity UA 2023 Clear  Clear Final    Glucose, Ur 2023 NEGATIVE  NEGATIVE Final    Bilirubin Urine 2023 NEGATIVE  NEGATIVE Final    Ketones, Urine 2023 NEGATIVE  NEGATIVE Final    Specific Gravity, UA 2023 1.017  1.000 - 1.030 Final    Urine Hgb 2023 NEGATIVE  NEGATIVE Final    pH, UA 2023 5.5  5.0 - 8.0 Final    Protein, UA 2023 NEGATIVE  NEGATIVE Final    Urobilinogen, Urine 2023 Normal  Normal Final    Nitrite, Urine 2023 NEGATIVE  NEGATIVE Final    Leukocyte Esterase, Urine 2023 NEGATIVE NEGATIVE Final    Urinalysis Comments 03/06/2023 Microscopic exam not performed based on chemical results unless requested in original order. Final   Discharge Instructions for Exploratory Laparotomy    Depending on your surgery, the hospital stay varies from 1-5 days. Recovery can take 6-8 weeks. What You Will Need   Dressing supplies   Steps to [place] [address] your doctor about when it is safe to shower, bathe, or soak in water. You may be advised to shower instead of taking a bath for the first two weeks after surgery. Keep your incision sites clean and dry. Wash your hands before changing the dressing. Do not douche or put anything in your vagina, such as a tampon, until your doctor tells you otherwise. Diet    While in the hospital, you will progress from intravenous fluid to a normal diet. If recommended by your doctor, eat a high-fiber diet to promote healing and prevent constipation . Drink plenty of fluids. Physical Activity    Ask your doctor when you will be able to return to work and drive. Return to your normal activities gradually. Most normal activities, including sex, can be resumed in about six weeks. Take daily walks as tolerated. Avoid heavy lifting and strenuous exercise until your doctor gives you permission to do so. Ask your doctor when and how to perform Kegel exercises . These exercises can strengthen the muscles of the pelvic floor and prevent or improve urinary incontinence , as well as enhance sexual pleasure. Medications    If you had to stop medicines before the procedure, ask your doctor when you can start again. Medicines often stopped include:   Anti-inflammatory drugs (eg, aspirin )   Blood thinners, like clopidogrel (Plavix) or warfarin (Coumadin)   You will likely go home with a prescription for pain medicine. If you had your ovaries removed with your uterus, you may be given an estrogen supplement.    Also, to prevent constipation, your doctor may recommend a stool softener. If you are taking medicines, follow these general guidelines:   Take your medicine as directed. Do not change the amount or the schedule. Do not stop taking them without talking to your doctor. Do not share them. Know what the results and side effects. Report them to your doctor. Some drugs can be dangerous when mixed. Talk to a doctor or pharmacist if you are taking more than one drug. This includes over-the-counter medicine and herb or dietary supplements. Plan ahead for refills so you do not run out. Lifestyle Changes    You and your doctor will plan lifestyle changes that will aid in your recovery. Some issues that may affect you include: If you experience a sense of loss or feel depressed after your surgery, it is important to talk to your doctor about these feelings since they can be treated. Follow-up   Schedule a follow-up appointment as directed by your doctor. If you had this procedure because of cancer, other treatment, such as radiation or hormonal therapy, may be used as well. Call Your Doctor If Any of the Following Occurs   It is important for you to monitor your recovery once you leave the hospital. That way, you can alert your doctor to any problems immediately.  If any of the following occurs, call your doctor:   Signs of infection, including fever and chills   Redness, swelling, increasing pain, excessive bleeding, leakage, or any discharge from the incision site   Incision opens up   Nausea and/or vomiting that you cannot control with the medicines you were given after surgery, or which persist for more than two days after discharge from the hospital   Dizziness or fainting   Cough, shortness of breath, or chest pain   Heavy bleeding   Pain that you cannot control with the medicines you have been given   Pain, burning, urgency or frequency of urination, or persistent bleeding in the urine   Swelling, redness, or pain in your leg In case of an emergency,  CALL 911  immediately.         Home care, Restrictions & Follow up Care review completed    RTO 2 weeks          Attending's Name:  Electronically signed by Jc Zamora DO on 3/7/2023 at 11:03 AM

## 2023-03-15 SDOH — ECONOMIC STABILITY: HOUSING INSECURITY
IN THE LAST 12 MONTHS, WAS THERE A TIME WHEN YOU DID NOT HAVE A STEADY PLACE TO SLEEP OR SLEPT IN A SHELTER (INCLUDING NOW)?: NO

## 2023-03-15 SDOH — ECONOMIC STABILITY: FOOD INSECURITY: WITHIN THE PAST 12 MONTHS, YOU WORRIED THAT YOUR FOOD WOULD RUN OUT BEFORE YOU GOT MONEY TO BUY MORE.: NEVER TRUE

## 2023-03-15 SDOH — ECONOMIC STABILITY: FOOD INSECURITY: WITHIN THE PAST 12 MONTHS, THE FOOD YOU BOUGHT JUST DIDN'T LAST AND YOU DIDN'T HAVE MONEY TO GET MORE.: NEVER TRUE

## 2023-03-15 SDOH — ECONOMIC STABILITY: INCOME INSECURITY: HOW HARD IS IT FOR YOU TO PAY FOR THE VERY BASICS LIKE FOOD, HOUSING, MEDICAL CARE, AND HEATING?: NOT VERY HARD

## 2023-03-15 SDOH — ECONOMIC STABILITY: TRANSPORTATION INSECURITY
IN THE PAST 12 MONTHS, HAS LACK OF TRANSPORTATION KEPT YOU FROM MEETINGS, WORK, OR FROM GETTING THINGS NEEDED FOR DAILY LIVING?: NO

## 2023-03-16 ENCOUNTER — OFFICE VISIT (OUTPATIENT)
Dept: OBGYN CLINIC | Age: 33
End: 2023-03-16

## 2023-03-16 VITALS
WEIGHT: 156 LBS | DIASTOLIC BLOOD PRESSURE: 70 MMHG | HEIGHT: 62 IN | SYSTOLIC BLOOD PRESSURE: 110 MMHG | BODY MASS INDEX: 28.71 KG/M2

## 2023-03-16 DIAGNOSIS — Z98.890 S/P EXPLORATORY LAPAROTOMY: ICD-10-CM

## 2023-03-16 DIAGNOSIS — Z98.890 POST-OPERATIVE STATE: Primary | ICD-10-CM

## 2023-03-16 PROCEDURE — 99024 POSTOP FOLLOW-UP VISIT: CPT | Performed by: OBSTETRICS & GYNECOLOGY

## 2023-03-16 NOTE — PROGRESS NOTES
Rahel Mottano  3/16/2023  11:11 AM      Rahel Castle  Procedure:   PROCEDURE DATE: 3/6/2023      Pre-op Diagnosis: Large Pedunculated Fibroid; PELVIC PAIN  ENDOMETRIOSIS Stage 3     Post-op Diagnosis: Same; Large Pedunculated Fundal/Posterior Fibroid left lateral; Endometriosis right ovary vesicular only     Procedure: Procedure(s):  LAPAROTOMY OPEN VIA PFANNENSTIEL FOR REMOVAL OF MYOMA FULGERATION OF IMPLANTS      Evelyn Monson is a 29 y.o. female       The patient was seen, she denies any complaints. She denied any shortness of breath, chest pain or dizziness. She denied any nausea, vomiting, or diarrhea. There is no fever, chills, or rigors. The patient denies any vaginal bleeding, discharge or odor. All of her pre-operative complaints are now resolved. Blood pressure 110/70, height 5' 2\" (1.575 m), weight 156 lb (70.8 kg), last menstrual period 2023, not currently breastfeeding. Abdominal Exam: soft non-tender. Good bowel sounds. No guarding, rebound or rigidity. No costal vertebral angle tenderness bilateral. No hernias    Incision: healing well, no drainage, no erythema, no hernia, no seroma, no swelling, well approximated    Extremities: No edema or calf pain noted bilaterally. Pelvic Exam: declined      Results for orders placed or performed during the hospital encounter of 23   SURGICAL PATHOLOGY REPORT   Result Value Ref Range    Surgical Pathology Report       -- Diagnosis --    UTERUS, MYOMECTOMY:  -LEIOMYOMA      Dominique Mac.  Nellie Walker D.O.  **Electronically Signed Out**         [facility]/3/7/2023       Clinical Information  Pre-Op Diagnosis:  PELVIC PAIN; ENDOMETRIOSIS  Operative Findings:  SUSPECTED PEDUNCULATED FIBROID  Operation Performed:  LAPAROTOMY OPEN VIA PFANNENSTIEL FOR REMOVAL OF  MYOMA FULGURATION OF IMPLANTS  cd    Source of Specimen  A: SUSPECTED PEDUNCULATED FIBROID    Gross Description  \"RAHEL CASTLE, SUSPECTED PEDUNCULATED FIBROID\" 23 gram, 4.8 x 4.2 x  3.6 cm circumscribed nodule. Sectioning reveals a tan-white, whorled  cut surface with no hemorrhage or necrosis. Representative sections  2cs. tm      Microscopic Description  Microscopic examination performed. SURGICAL PATHOLOGY CONSULTATION       Patient Name: Nancy [facility] Rec: [id number]  Path Number: [id number]    [address] [place] [address], [facility] [address]. [facility], [address] [place]  [phone]  Fax: [phone]     Hemoglobin and Hematocrit   Result Value Ref Range    Hemoglobin 13.4 12.0 - 16.0 g/dL    Hematocrit 38.9 36 - 46 %   Urinalysis with Reflex to Culture   Result Value Ref Range    Color, UA Yellow Yellow    Turbidity UA Clear Clear    Glucose, Ur NEGATIVE NEGATIVE    Bilirubin Urine NEGATIVE NEGATIVE    Ketones, Urine NEGATIVE NEGATIVE    Specific Gravity, UA 1.017 1.000 - 1.030    Urine Hgb NEGATIVE NEGATIVE    pH, UA 5.5 5.0 - 8.0    Protein, UA NEGATIVE NEGATIVE    Urobilinogen, Urine Normal Normal    Nitrite, Urine NEGATIVE NEGATIVE    Leukocyte Esterase, Urine NEGATIVE NEGATIVE    Urinalysis Comments       Microscopic exam not performed based on chemical results unless requested in original order. POCT HCG, Prenancy, Ur   Result Value Ref Range    HCG, Pregnancy Urine (POC) NEGATIVE NEGATIVE           Assessment:      Diagnosis Orders   1. Post-operative state        2.  Exploratory laparotomy Uterine Myomectomy and fulguration of endometriosis 3/6/2023             Patient Active Problem List    Diagnosis Date Noted    Exploratory laparotomy Uterine Myomectomy and fulguration of endometriosis 3/6/2023 03/06/2023     Priority: High    Pelvic pain 11/17/2022     Priority: High    Laparoscopy Operative with FOI of Stage 3 endometriosis and Chromopertubation-Patent 11/17/2022 11/17/2022     Priority: High    Atypical squamous cells of undetermined significance on cytologic smear of cervix (ASC-US) + HRHPV Other 07/13/2021     Priority: High     5/5/2022 Colposcopy with ECC completed  5/2023 PAP (if colposcopy in 2022 is negative)      Subserous leiomyoma of uterus-Pedunculated posteriorly 02/05/2021     Priority: High    Endometriosis Stage 3 by photos 12/30/2020     Priority: High     Pt was in test pool for [place]ilissa with Success      Menorrhagia with regular cycle 09/28/2016     Priority: High    Postoperative state 03/06/2023     Priority: Medium    Hypothyroidism 12/30/2020     Priority: Medium    Depression 12/30/2020     Priority: Medium     On Clonopin      Acne vulgaris 09/28/2016    Family planning 09/28/2016     Updating deleted diagnoses      Anxiety           POD# 10   Procedure: see above   Stable   Pathology reviewed and found to be benign. Yes    Plan:   Return in about 5 weeks (around 4/20/2023) for Post Operative Evaluation. Continue with restrictions. Pelvic rest. No lifting or intercourse. No baths or pools. No douching or tampons. Return to office 4-5 weeks  I reviewed Michi Abraham options vs expectant fu. Pt elects expectant fu at this time  Pt requesting RTW 3/20/2023-Ok as pt states dek job from home only.  No lifting

## 2023-05-12 ENCOUNTER — TELEMEDICINE (OUTPATIENT)
Dept: OBGYN CLINIC | Age: 33
End: 2023-05-12

## 2023-05-12 DIAGNOSIS — Z98.890 POST-OPERATIVE STATE: Primary | ICD-10-CM

## 2023-05-12 DIAGNOSIS — Z98.890 S/P EXPLORATORY LAPAROTOMY: ICD-10-CM

## 2023-05-12 PROCEDURE — 99024 POSTOP FOLLOW-UP VISIT: CPT | Performed by: OBSTETRICS & GYNECOLOGY

## 2023-05-12 NOTE — PROGRESS NOTES
Virtually)    Incision: healing well, no drainage, no erythema, no hernia, no seroma, no swelling, well approximated (by video confirmation) (Viewed Virtually)    Extremities: No edema or calf pain noted bilaterally. Patient participation. (Viewed Virtually)    Pelvic Exam: Virtual visit-not completed      Results for orders placed or performed during the hospital encounter of 03/06/23   SURGICAL PATHOLOGY REPORT   Result Value Ref Range    Surgical Pathology Report       -- Diagnosis --    UTERUS, MYOMECTOMY:  -Margie Guevara D.O.  **Electronically Signed Out**         [facility]/3/7/2023       Clinical Information  Pre-Op Diagnosis:  PELVIC PAIN; ENDOMETRIOSIS  Operative Findings:  SUSPECTED PEDUNCULATED FIBROID  Operation Performed:  LAPAROTOMY OPEN VIA PFANNENSTIEL FOR REMOVAL OF  MYOMA FULGURATION OF IMPLANTS  cd    Source of Specimen  A: SUSPECTED PEDUNCULATED FIBROID    Gross Description  \"SEAN TIN, SUSPECTED PEDUNCULATED FIBROID\" 23 gram, 4.8 x 4.2 x  3.6 cm circumscribed nodule. Sectioning reveals a tan-white, whorled  cut surface with no hemorrhage or necrosis. Representative sections  2cs. tm      Microscopic Description  Microscopic examination performed. SURGICAL PATHOLOGY CONSULTATION       Patient Name: Melida Benavides [facility] Rec: [id number]  Path Number: [id number]    [address] [place] [address], [place] [address].   [place], [address] [facility] [place]  [phone]  Fax: [phone]     Hemoglobin and Hematocrit   Result Value Ref Range    Hemoglobin 13.4 12.0 - 16.0 g/dL    Hematocrit 38.9 36 - 46 %   Urinalysis with Reflex to Culture   Result Value Ref Range    Color, UA Yellow Yellow    Turbidity UA Clear Clear    Glucose, Ur NEGATIVE NEGATIVE    Bilirubin Urine NEGATIVE NEGATIVE    Ketones, Urine NEGATIVE NEGATIVE    Specific Gravity, UA 1.017 1.000 - 1.030    Urine Hgb NEGATIVE NEGATIVE    pH, UA 5.5 5.0 - 8.0    Protein, UA

## 2023-06-15 DIAGNOSIS — Z11.51 SPECIAL SCREENING EXAMINATION FOR HUMAN PAPILLOMAVIRUS (HPV): ICD-10-CM

## 2023-06-15 DIAGNOSIS — Z01.419 WELL WOMAN EXAM WITH ROUTINE GYNECOLOGICAL EXAM: ICD-10-CM

## 2023-11-22 NOTE — TELEPHONE ENCOUNTER
Writer spoke with [facility]. HPV not ran on initial specimen but was ordered as an add on at time of call. Should have results within 7-10 days. High Dose Vitamin A Counseling: Side effects reviewed, pt to contact office should one occur.

## 2025-01-04 ENCOUNTER — HOSPITAL ENCOUNTER (EMERGENCY)
Facility: CLINIC | Age: 35
Discharge: HOME | End: 2025-01-04
Attending: EMERGENCY MEDICINE
Payer: COMMERCIAL

## 2025-01-04 ENCOUNTER — APPOINTMENT (OUTPATIENT)
Dept: CT IMAGING | Facility: CLINIC | Age: 35
End: 2025-01-04
Payer: COMMERCIAL

## 2025-01-04 VITALS
OXYGEN SATURATION: 100 % | TEMPERATURE: 98.1 F | WEIGHT: 145 LBS | RESPIRATION RATE: 18 BRPM | BODY MASS INDEX: 26.68 KG/M2 | SYSTOLIC BLOOD PRESSURE: 98 MMHG | HEIGHT: 62 IN | HEART RATE: 95 BPM | DIASTOLIC BLOOD PRESSURE: 77 MMHG

## 2025-01-04 DIAGNOSIS — M54.32 SCIATICA OF LEFT SIDE: Primary | ICD-10-CM

## 2025-01-04 LAB — HCG UR QL: NEGATIVE

## 2025-01-04 PROCEDURE — 96372 THER/PROPH/DIAG INJ SC/IM: CPT

## 2025-01-04 PROCEDURE — 72131 CT LUMBAR SPINE W/O DYE: CPT

## 2025-01-04 PROCEDURE — 99284 EMERGENCY DEPT VISIT MOD MDM: CPT

## 2025-01-04 PROCEDURE — 6360000002 HC RX W HCPCS

## 2025-01-04 PROCEDURE — 81025 URINE PREGNANCY TEST: CPT

## 2025-01-04 RX ORDER — CYCLOBENZAPRINE HCL 10 MG
10 TABLET ORAL 3 TIMES DAILY PRN
Qty: 21 TABLET | Refills: 0 | Status: SHIPPED | OUTPATIENT
Start: 2025-01-04 | End: 2025-01-14

## 2025-01-04 RX ORDER — CELECOXIB 200 MG/1
200 CAPSULE ORAL 2 TIMES DAILY
Qty: 30 CAPSULE | Refills: 0 | Status: SHIPPED | OUTPATIENT
Start: 2025-01-04

## 2025-01-04 RX ORDER — KETOROLAC TROMETHAMINE 30 MG/ML
30 INJECTION, SOLUTION INTRAMUSCULAR; INTRAVENOUS ONCE
Status: COMPLETED | OUTPATIENT
Start: 2025-01-04 | End: 2025-01-04

## 2025-01-04 RX ADMIN — KETOROLAC TROMETHAMINE 30 MG: 30 INJECTION, SOLUTION INTRAMUSCULAR at 12:16

## 2025-01-04 ASSESSMENT — PAIN DESCRIPTION - ORIENTATION
ORIENTATION: LEFT

## 2025-01-04 ASSESSMENT — PAIN DESCRIPTION - PAIN TYPE
TYPE: ACUTE PAIN;CHRONIC PAIN
TYPE: ACUTE PAIN

## 2025-01-04 ASSESSMENT — PAIN DESCRIPTION - DESCRIPTORS
DESCRIPTORS: NAGGING
DESCRIPTORS: SHARP;SHOOTING

## 2025-01-04 ASSESSMENT — PAIN SCALES - GENERAL
PAINLEVEL_OUTOF10: 10
PAINLEVEL_OUTOF10: 9
PAINLEVEL_OUTOF10: 10

## 2025-01-04 ASSESSMENT — PAIN - FUNCTIONAL ASSESSMENT
PAIN_FUNCTIONAL_ASSESSMENT: 0-10
PAIN_FUNCTIONAL_ASSESSMENT: PREVENTS OR INTERFERES SOME ACTIVE ACTIVITIES AND ADLS
PAIN_FUNCTIONAL_ASSESSMENT: PREVENTS OR INTERFERES SOME ACTIVE ACTIVITIES AND ADLS
PAIN_FUNCTIONAL_ASSESSMENT: PREVENTS OR INTERFERES WITH MANY ACTIVE NOT PASSIVE ACTIVITIES

## 2025-01-04 ASSESSMENT — PAIN DESCRIPTION - LOCATION
LOCATION: BACK;HIP

## 2025-01-04 NOTE — ED PROVIDER NOTES
[facility]  [address] [address]  Phone: [phone]        Pt Name: Rahel Pittman  MRN: [id number]  Birthdate 1990  Date of evaluation: 1/4/25    CHIEFCOMPLAINT       Chief Complaint   Patient presents with    Hip Pain     Left sided back/hip/buttock pain. Numbness and tingling left leg to toes       HISTORY OF PRESENT ILLNESS (Location/Symptom, Timing/Onset, Context/Setting, Quality, Duration, Modifying Factors, Severity)      Rahel Pittman is a 34 y.o. female with no pertinent PMH who presents to the ED via private auto with acute on chronic lbp with left sciatica.  Onset of symptoms was 2 weeks ago she just finished a course of anti-inflammatories steroids and Flexeril.  Her symptoms have not improved.  Denies any loss of bowel or bladder control, no fevers or chills.  Pain is in the left buttock radiates down to the left ankle she does have some intermittent numbness and tingling of the left lower extremity without weakness.  Patient has had episodes of this in the past typically they have resolved on their own within a few days.  This episode is lasting longer than her typical episode.  She has not had any imaging of her lower back and she has not seen orthopedic spine      PAST MEDICAL / SURGICAL / SOCIAL / FAMILY HISTORY     PMH:  has a past medical history of Anxiety, Back pain, Endometriosis, Hyperlipidemia, Kidney stones, Thyroid disease, and URI (upper respiratory infection).  Surgical History:  has a past surgical history that includes Abdominal exploration surgery (11/15/2016); Ovary surgery (Right, 11/15/2016); laparoscopy (N/A, 11/17/2022); Dilation & curettage (November 2016); and myomectomy (N/A, 3/6/2023).  Social History:  reports that she quit smoking about a year ago. Her smoking use included cigarettes. She started smoking about 16 years ago. She has a 3.9 pack-year smoking history. She has never used smokeless tobacco. She reports that she  reformatted images are provided for review. Adjustment of mA and/or kV according to patient size was utilized.  Automated exposure control, iterative reconstruction, and/or weight based adjustment of the mA/kV was utilized to reduce the radiation dose to as low as reasonably achievable. COMPARISON: None HISTORY: ORDERING SYSTEM PROVIDED HISTORY: lbp with left sciatica TECHNOLOGIST PROVIDED HISTORY: lbp with left sciatica Decision Support Exception - unselect if not a suspected or confirmed emergency medical condition->Emergency Medical Condition (MA) Is the patient pregnant?->No Reason for Exam: Lower back pain, radiating into left hip, numbness and tingling down to toes FINDINGS: BONES/ALIGNMENT: There are 5 lumbar vertebrae.  There is normal spinal alignment.  Vertebral body heights appear normal.  There is no acute fracture. DEGENERATIVE CHANGES: There is early multilevel degenerative disc disease with small osteophytes at the endplates and mild disc space narrowing at L4-L5.  There is early multilevel facet arthropathy. Disc bulging, facet arthropathy and ligamentum flavum hypertrophy cause mild canal narrowing at L3-L4.  There is a left posterior disc herniation at L4-L5.  This combined with disc bulging, facet arthropathy and ligamentum flavum hypertrophy results in moderate central canal narrowing with severe narrowing of the left lateral recess which could compress the S1 roots in the lateral recess.  No neural foraminal narrowing is identified.  Elsewhere, no significant canal or neural foraminal narrowing is noted. SOFT TISSUES/RETROPERITONEUM: There are at least 3 punctate stones in the right kidney.  There is a 3 mm stone at the mid left kidney.  No hydronephrosis or ureteral stone is noted where the ureters are visible. There are no acute findings in the paraspinal soft tissues.     1. No acute lumbar spine fracture. 2. Left posterior disc herniation at L4-L5 which combined with disc bulging, facet

## 2025-01-04 NOTE — DISCHARGE INSTRUCTIONS
1. No acute lumbar spine fracture.  2. Left posterior disc herniation at L4-L5 which combined with disc bulging,  facet arthropathy and ligamentum flavum hypertrophy causes moderate central  canal narrowing with severe narrowing of the left lateral recess which could  compress the left S1 roots in the lateral recess.  3. Mild canal narrowing at L3-L4 related to disc bulging, facet arthropathy  and ligamentum flavum hypertrophy.  4. Bilateral nephrolithiasis.        Use ice to the lower back 20 minutes at a time several times a day over the next couple days.  Take medications as prescribed over the next 2 to 3 days.  Do not drive or drink alcohol with these medications.  Do gentle stretching particularly lying on your stomach and pressing upward will help to decompress the lower back.  Stretching the back of your legs will also be helpful.  Follow-up with your primary care if you are not having improvement in 2 to 3 days as you may need physical therapy  You may benefit from establishing with orthopedic spine

## 2025-01-04 NOTE — ED TRIAGE NOTES
Mode of arrival (squad #, walk in, police, etc) : walk in        Chief complaint(s): left hip/buttock pain        Arrival Note (brief scenario, treatment PTA, etc).: pt  has been treated for hip/back pain with flexeril and prednisone with little relief.  No new injury noted.  has had a back injury from a fall 10 years ago.

## 2025-01-24 ENCOUNTER — HOSPITAL ENCOUNTER (OUTPATIENT)
Dept: MRI IMAGING | Age: 35
Discharge: HOME | End: 2025-01-26
Payer: COMMERCIAL

## 2025-01-24 DIAGNOSIS — M54.16 LUMBAR RADICULOPATHY: ICD-10-CM

## 2025-01-24 PROCEDURE — 72148 MRI LUMBAR SPINE W/O DYE: CPT

## 2025-02-13 RX ORDER — GABAPENTIN 300 MG/1
300 CAPSULE ORAL NIGHTLY
COMMUNITY
Start: 2025-01-06

## 2025-02-13 RX ORDER — HYDROCODONE BITARTRATE AND ACETAMINOPHEN 5; 325 MG/1; MG/1
TABLET ORAL
Status: ON HOLD | COMMUNITY
Start: 2025-01-06 | End: 2025-02-21 | Stop reason: HOSPADM

## 2025-02-13 NOTE — PROGRESS NOTES
DAY OF SURGERY/PROCEDURE  GUIDELINES    As a patient at the [facility], you can expect quality medical and nursing care that is centered on your individual needs. It is our goal to make your surgical experience as comfortable and excellent as possible.  ________________________________________________________________________    The following instructions are general guidelines, if any information on this sheet is different from what your doctor has instructed you to do, please follow your doctor's instructions.    Please arrive on 2/21 @ 1015 am       Enter through entrance C. Check in at registration     Upon arrival you will be taken to the pre-operative area to get ready for surgery, your family will stay in the waiting room and visit with you once you are ready for surgery. Due to special limitations please limit visitation to 1-2 members of your family at a time. When it is time for surgery your family will return to the waiting room.    Nothing to eat, drink, smoke, suck or chew after midnight (no water, gum, mints, cigarettes, cigars, pipes, snuff, chewing tobacco, etc.) or your surgery may be canceled.     Take a shower or bath on the morning of your surgery/procedure (Hibiclens if directed) Do not apply any lotions.    Brush your teeth, but do not swallow any water    IN CASE OF ILLNESS - If you have a cold or flu symptoms (high fever, runny nose, sore throat, cough, etc.) rash, nausea, vomiting, loose stools, and/or recent contact with someone who has a contagious disease (chick pox, measles, etc.) please call your doctor before coming to the surgery center    DO NOT take anticoagulants (blood thinners, aspirin or aspirin-containing products) as instructed by your physician.    Leave all jewelry at home and wear loose, comfortable clothing that is easy to put on and take off.     If you will be returning home the same day as your surgery, you will need to have a responsible adult (18  years of age or older) present to drive you home. You will need someone stay with you at home for the first 24 hours following your surgery. This is due to the anesthesia and the medication given to you during surgery and recovery.

## 2025-02-17 ENCOUNTER — ANESTHESIA EVENT (OUTPATIENT)
Dept: OPERATING ROOM | Age: 35
End: 2025-02-17
Payer: COMMERCIAL

## 2025-02-21 ENCOUNTER — ANESTHESIA (OUTPATIENT)
Dept: OPERATING ROOM | Age: 35
End: 2025-02-21
Payer: COMMERCIAL

## 2025-02-21 ENCOUNTER — HOSPITAL ENCOUNTER (OUTPATIENT)
Age: 35
Setting detail: OUTPATIENT SURGERY
Discharge: HOME | End: 2025-02-21
Attending: STUDENT IN AN ORGANIZED HEALTH CARE EDUCATION/TRAINING PROGRAM | Admitting: STUDENT IN AN ORGANIZED HEALTH CARE EDUCATION/TRAINING PROGRAM
Payer: COMMERCIAL

## 2025-02-21 ENCOUNTER — APPOINTMENT (OUTPATIENT)
Dept: GENERAL RADIOLOGY | Age: 35
End: 2025-02-21
Attending: STUDENT IN AN ORGANIZED HEALTH CARE EDUCATION/TRAINING PROGRAM
Payer: COMMERCIAL

## 2025-02-21 VITALS
HEART RATE: 61 BPM | HEIGHT: 62 IN | TEMPERATURE: 98.1 F | SYSTOLIC BLOOD PRESSURE: 107 MMHG | RESPIRATION RATE: 14 BRPM | BODY MASS INDEX: 25.95 KG/M2 | WEIGHT: 141 LBS | OXYGEN SATURATION: 98 % | DIASTOLIC BLOOD PRESSURE: 69 MMHG

## 2025-02-21 DIAGNOSIS — Z98.890 POSTOPERATIVE STATE: Primary | ICD-10-CM

## 2025-02-21 LAB — HCG, PREGNANCY URINE (POC): NEGATIVE

## 2025-02-21 PROCEDURE — 2500000003 HC RX 250 WO HCPCS: Performed by: STUDENT IN AN ORGANIZED HEALTH CARE EDUCATION/TRAINING PROGRAM

## 2025-02-21 PROCEDURE — 3600000004 HC SURGERY LEVEL 4 BASE: Performed by: STUDENT IN AN ORGANIZED HEALTH CARE EDUCATION/TRAINING PROGRAM

## 2025-02-21 PROCEDURE — 6370000000 HC RX 637 (ALT 250 FOR IP): Performed by: STUDENT IN AN ORGANIZED HEALTH CARE EDUCATION/TRAINING PROGRAM

## 2025-02-21 PROCEDURE — 7100000010 HC PHASE II RECOVERY - FIRST 15 MIN: Performed by: STUDENT IN AN ORGANIZED HEALTH CARE EDUCATION/TRAINING PROGRAM

## 2025-02-21 PROCEDURE — 6360000002 HC RX W HCPCS: Performed by: STUDENT IN AN ORGANIZED HEALTH CARE EDUCATION/TRAINING PROGRAM

## 2025-02-21 PROCEDURE — 7100000011 HC PHASE II RECOVERY - ADDTL 15 MIN: Performed by: STUDENT IN AN ORGANIZED HEALTH CARE EDUCATION/TRAINING PROGRAM

## 2025-02-21 PROCEDURE — 3600000014 HC SURGERY LEVEL 4 ADDTL 15MIN: Performed by: STUDENT IN AN ORGANIZED HEALTH CARE EDUCATION/TRAINING PROGRAM

## 2025-02-21 PROCEDURE — 2580000003 HC RX 258: Performed by: ANESTHESIOLOGY

## 2025-02-21 PROCEDURE — 3700000001 HC ADD 15 MINUTES (ANESTHESIA): Performed by: STUDENT IN AN ORGANIZED HEALTH CARE EDUCATION/TRAINING PROGRAM

## 2025-02-21 PROCEDURE — 81025 URINE PREGNANCY TEST: CPT

## 2025-02-21 PROCEDURE — 7100000000 HC PACU RECOVERY - FIRST 15 MIN: Performed by: STUDENT IN AN ORGANIZED HEALTH CARE EDUCATION/TRAINING PROGRAM

## 2025-02-21 PROCEDURE — 6360000002 HC RX W HCPCS

## 2025-02-21 PROCEDURE — 7100000001 HC PACU RECOVERY - ADDTL 15 MIN: Performed by: STUDENT IN AN ORGANIZED HEALTH CARE EDUCATION/TRAINING PROGRAM

## 2025-02-21 PROCEDURE — 6370000000 HC RX 637 (ALT 250 FOR IP)

## 2025-02-21 PROCEDURE — 3700000000 HC ANESTHESIA ATTENDED CARE: Performed by: STUDENT IN AN ORGANIZED HEALTH CARE EDUCATION/TRAINING PROGRAM

## 2025-02-21 PROCEDURE — 2500000003 HC RX 250 WO HCPCS

## 2025-02-21 PROCEDURE — 6360000002 HC RX W HCPCS: Performed by: ANESTHESIOLOGY

## 2025-02-21 PROCEDURE — 2709999900 HC NON-CHARGEABLE SUPPLY: Performed by: STUDENT IN AN ORGANIZED HEALTH CARE EDUCATION/TRAINING PROGRAM

## 2025-02-21 RX ORDER — FENTANYL CITRATE 50 UG/ML
INJECTION, SOLUTION INTRAMUSCULAR; INTRAVENOUS
Status: DISCONTINUED | OUTPATIENT
Start: 2025-02-21 | End: 2025-02-21 | Stop reason: SDUPTHER

## 2025-02-21 RX ORDER — BUPIVACAINE HYDROCHLORIDE AND EPINEPHRINE 5; 5 MG/ML; UG/ML
INJECTION, SOLUTION PERINEURAL
Status: DISCONTINUED
Start: 2025-02-21 | End: 2025-02-21 | Stop reason: HOSPADM

## 2025-02-21 RX ORDER — DIPHENHYDRAMINE HYDROCHLORIDE 50 MG/ML
12.5 INJECTION INTRAMUSCULAR; INTRAVENOUS
Status: DISCONTINUED | OUTPATIENT
Start: 2025-02-21 | End: 2025-02-21 | Stop reason: HOSPADM

## 2025-02-21 RX ORDER — OXYCODONE HYDROCHLORIDE 5 MG/1
TABLET ORAL
Status: COMPLETED
Start: 2025-02-21 | End: 2025-02-21

## 2025-02-21 RX ORDER — METOCLOPRAMIDE HYDROCHLORIDE 5 MG/ML
10 INJECTION INTRAMUSCULAR; INTRAVENOUS
Status: COMPLETED | OUTPATIENT
Start: 2025-02-21 | End: 2025-02-21

## 2025-02-21 RX ORDER — VANCOMYCIN HYDROCHLORIDE 1 G/20ML
INJECTION, POWDER, LYOPHILIZED, FOR SOLUTION INTRAVENOUS
Status: DISCONTINUED
Start: 2025-02-21 | End: 2025-02-21 | Stop reason: WASHOUT

## 2025-02-21 RX ORDER — DIPHENHYDRAMINE HYDROCHLORIDE 50 MG/ML
INJECTION INTRAMUSCULAR; INTRAVENOUS
Status: DISCONTINUED | OUTPATIENT
Start: 2025-02-21 | End: 2025-02-21 | Stop reason: SDUPTHER

## 2025-02-21 RX ORDER — SODIUM CHLORIDE 9 MG/ML
INJECTION, SOLUTION INTRAVENOUS CONTINUOUS
Status: DISCONTINUED | OUTPATIENT
Start: 2025-02-21 | End: 2025-02-21 | Stop reason: HOSPADM

## 2025-02-21 RX ORDER — SODIUM CHLORIDE 0.9 % (FLUSH) 0.9 %
5-40 SYRINGE (ML) INJECTION PRN
Status: DISCONTINUED | OUTPATIENT
Start: 2025-02-21 | End: 2025-02-21 | Stop reason: HOSPADM

## 2025-02-21 RX ORDER — MIDAZOLAM HYDROCHLORIDE 2 MG/2ML
2 INJECTION, SOLUTION INTRAMUSCULAR; INTRAVENOUS
Status: DISCONTINUED | OUTPATIENT
Start: 2025-02-21 | End: 2025-02-21 | Stop reason: HOSPADM

## 2025-02-21 RX ORDER — SODIUM CHLORIDE, SODIUM LACTATE, POTASSIUM CHLORIDE, CALCIUM CHLORIDE 600; 310; 30; 20 MG/100ML; MG/100ML; MG/100ML; MG/100ML
INJECTION, SOLUTION INTRAVENOUS CONTINUOUS
Status: DISCONTINUED | OUTPATIENT
Start: 2025-02-21 | End: 2025-02-21 | Stop reason: HOSPADM

## 2025-02-21 RX ORDER — DEXAMETHASONE SODIUM PHOSPHATE 4 MG/ML
INJECTION, SOLUTION INTRA-ARTICULAR; INTRALESIONAL; INTRAMUSCULAR; INTRAVENOUS; SOFT TISSUE
Status: DISCONTINUED | OUTPATIENT
Start: 2025-02-21 | End: 2025-02-21 | Stop reason: SDUPTHER

## 2025-02-21 RX ORDER — MAGNESIUM SULFATE 1 G/100ML
INJECTION INTRAVENOUS
Status: COMPLETED
Start: 2025-02-21 | End: 2025-02-21

## 2025-02-21 RX ORDER — ONDANSETRON 2 MG/ML
4 INJECTION INTRAMUSCULAR; INTRAVENOUS
Status: COMPLETED | OUTPATIENT
Start: 2025-02-21 | End: 2025-02-21

## 2025-02-21 RX ORDER — LIDOCAINE HYDROCHLORIDE 10 MG/ML
INJECTION, SOLUTION EPIDURAL; INFILTRATION; INTRACAUDAL; PERINEURAL
Status: DISCONTINUED | OUTPATIENT
Start: 2025-02-21 | End: 2025-02-21 | Stop reason: SDUPTHER

## 2025-02-21 RX ORDER — OXYCODONE HYDROCHLORIDE 5 MG/1
5 TABLET ORAL EVERY 6 HOURS PRN
Qty: 28 TABLET | Refills: 0 | Status: SHIPPED | OUTPATIENT
Start: 2025-02-21 | End: 2025-02-28

## 2025-02-21 RX ORDER — SODIUM CHLORIDE 0.9 % (FLUSH) 0.9 %
5-40 SYRINGE (ML) INJECTION EVERY 12 HOURS SCHEDULED
Status: DISCONTINUED | OUTPATIENT
Start: 2025-02-21 | End: 2025-02-21 | Stop reason: HOSPADM

## 2025-02-21 RX ORDER — SODIUM CHLORIDE 9 MG/ML
INJECTION, SOLUTION INTRAVENOUS PRN
Status: DISCONTINUED | OUTPATIENT
Start: 2025-02-21 | End: 2025-02-21 | Stop reason: HOSPADM

## 2025-02-21 RX ORDER — DEXMEDETOMIDINE HYDROCHLORIDE 100 UG/ML
INJECTION, SOLUTION INTRAVENOUS
Status: DISCONTINUED | OUTPATIENT
Start: 2025-02-21 | End: 2025-02-21 | Stop reason: SDUPTHER

## 2025-02-21 RX ORDER — OXYCODONE AND ACETAMINOPHEN 5; 325 MG/1; MG/1
1 TABLET ORAL
Status: DISCONTINUED | OUTPATIENT
Start: 2025-02-21 | End: 2025-02-21

## 2025-02-21 RX ORDER — METHYLPREDNISOLONE ACETATE 40 MG/ML
INJECTION, SUSPENSION INTRA-ARTICULAR; INTRALESIONAL; INTRAMUSCULAR; SOFT TISSUE
Status: DISCONTINUED
Start: 2025-02-21 | End: 2025-02-21 | Stop reason: HOSPADM

## 2025-02-21 RX ORDER — CYCLOBENZAPRINE HCL 10 MG
5 TABLET ORAL 3 TIMES DAILY PRN
Qty: 21 TABLET | Refills: 0 | Status: SHIPPED | OUTPATIENT
Start: 2025-02-21 | End: 2025-03-07

## 2025-02-21 RX ORDER — OXYCODONE AND ACETAMINOPHEN 5; 325 MG/1; MG/1
2 TABLET ORAL
Status: DISCONTINUED | OUTPATIENT
Start: 2025-02-21 | End: 2025-02-21

## 2025-02-21 RX ORDER — HYDRALAZINE HYDROCHLORIDE 20 MG/ML
10 INJECTION INTRAMUSCULAR; INTRAVENOUS
Status: DISCONTINUED | OUTPATIENT
Start: 2025-02-21 | End: 2025-02-21 | Stop reason: HOSPADM

## 2025-02-21 RX ORDER — PROPOFOL 10 MG/ML
INJECTION, EMULSION INTRAVENOUS
Status: DISCONTINUED | OUTPATIENT
Start: 2025-02-21 | End: 2025-02-21 | Stop reason: SDUPTHER

## 2025-02-21 RX ORDER — MEPERIDINE HYDROCHLORIDE 50 MG/ML
12.5 INJECTION INTRAMUSCULAR; INTRAVENOUS; SUBCUTANEOUS EVERY 5 MIN PRN
Status: DISCONTINUED | OUTPATIENT
Start: 2025-02-21 | End: 2025-02-21 | Stop reason: HOSPADM

## 2025-02-21 RX ORDER — KETOROLAC TROMETHAMINE 30 MG/ML
INJECTION, SOLUTION INTRAMUSCULAR; INTRAVENOUS
Status: DISCONTINUED | OUTPATIENT
Start: 2025-02-21 | End: 2025-02-21 | Stop reason: SDUPTHER

## 2025-02-21 RX ORDER — LABETALOL HYDROCHLORIDE 5 MG/ML
10 INJECTION, SOLUTION INTRAVENOUS
Status: DISCONTINUED | OUTPATIENT
Start: 2025-02-21 | End: 2025-02-21 | Stop reason: HOSPADM

## 2025-02-21 RX ORDER — IPRATROPIUM BROMIDE AND ALBUTEROL SULFATE 2.5; .5 MG/3ML; MG/3ML
SOLUTION RESPIRATORY (INHALATION)
Status: COMPLETED
Start: 2025-02-21 | End: 2025-02-21

## 2025-02-21 RX ORDER — IPRATROPIUM BROMIDE AND ALBUTEROL SULFATE 2.5; .5 MG/3ML; MG/3ML
1 SOLUTION RESPIRATORY (INHALATION)
Status: DISCONTINUED | OUTPATIENT
Start: 2025-02-21 | End: 2025-02-21 | Stop reason: HOSPADM

## 2025-02-21 RX ORDER — GLYCOPYRROLATE 0.2 MG/ML
0.4 INJECTION INTRAMUSCULAR; INTRAVENOUS ONCE
Status: DISCONTINUED | OUTPATIENT
Start: 2025-02-21 | End: 2025-02-21 | Stop reason: HOSPADM

## 2025-02-21 RX ORDER — OXYCODONE HYDROCHLORIDE 5 MG/1
5 TABLET ORAL
Status: COMPLETED | OUTPATIENT
Start: 2025-02-21 | End: 2025-02-21

## 2025-02-21 RX ORDER — ROCURONIUM BROMIDE 10 MG/ML
INJECTION, SOLUTION INTRAVENOUS
Status: DISCONTINUED | OUTPATIENT
Start: 2025-02-21 | End: 2025-02-21 | Stop reason: SDUPTHER

## 2025-02-21 RX ORDER — MORPHINE SULFATE 2 MG/ML
2 INJECTION, SOLUTION INTRAMUSCULAR; INTRAVENOUS EVERY 5 MIN PRN
Status: DISCONTINUED | OUTPATIENT
Start: 2025-02-21 | End: 2025-02-21 | Stop reason: HOSPADM

## 2025-02-21 RX ORDER — MAGNESIUM SULFATE 1 G/100ML
INJECTION INTRAVENOUS
Status: DISCONTINUED | OUTPATIENT
Start: 2025-02-21 | End: 2025-02-21 | Stop reason: SDUPTHER

## 2025-02-21 RX ORDER — NALOXONE HYDROCHLORIDE 0.4 MG/ML
INJECTION, SOLUTION INTRAMUSCULAR; INTRAVENOUS; SUBCUTANEOUS PRN
Status: DISCONTINUED | OUTPATIENT
Start: 2025-02-21 | End: 2025-02-21 | Stop reason: HOSPADM

## 2025-02-21 RX ORDER — MIDAZOLAM HYDROCHLORIDE 1 MG/ML
INJECTION, SOLUTION INTRAMUSCULAR; INTRAVENOUS
Status: DISCONTINUED | OUTPATIENT
Start: 2025-02-21 | End: 2025-02-21 | Stop reason: SDUPTHER

## 2025-02-21 RX ORDER — IPRATROPIUM BROMIDE AND ALBUTEROL SULFATE 2.5; .5 MG/3ML; MG/3ML
1 SOLUTION RESPIRATORY (INHALATION) ONCE
Status: CANCELLED | OUTPATIENT
Start: 2025-02-21 | End: 2025-02-21

## 2025-02-21 RX ORDER — ONDANSETRON 2 MG/ML
INJECTION INTRAMUSCULAR; INTRAVENOUS
Status: DISCONTINUED | OUTPATIENT
Start: 2025-02-21 | End: 2025-02-21 | Stop reason: SDUPTHER

## 2025-02-21 RX ORDER — METHYLPREDNISOLONE ACETATE 40 MG/ML
INJECTION, SUSPENSION INTRA-ARTICULAR; INTRALESIONAL; INTRAMUSCULAR; SOFT TISSUE PRN
Status: DISCONTINUED | OUTPATIENT
Start: 2025-02-21 | End: 2025-02-21 | Stop reason: ALTCHOICE

## 2025-02-21 RX ADMIN — FENTANYL CITRATE 25 MCG: 50 INJECTION, SOLUTION INTRAMUSCULAR; INTRAVENOUS at 15:41

## 2025-02-21 RX ADMIN — SODIUM CHLORIDE, POTASSIUM CHLORIDE, SODIUM LACTATE AND CALCIUM CHLORIDE: 600; 310; 30; 20 INJECTION, SOLUTION INTRAVENOUS at 13:32

## 2025-02-21 RX ADMIN — ONDANSETRON 4 MG: 2 INJECTION INTRAMUSCULAR; INTRAVENOUS at 15:22

## 2025-02-21 RX ADMIN — SODIUM CHLORIDE, POTASSIUM CHLORIDE, SODIUM LACTATE AND CALCIUM CHLORIDE: 600; 310; 30; 20 INJECTION, SOLUTION INTRAVENOUS at 13:41

## 2025-02-21 RX ADMIN — HYDROMORPHONE HYDROCHLORIDE 0.5 MG: 1 INJECTION, SOLUTION INTRAMUSCULAR; INTRAVENOUS; SUBCUTANEOUS at 16:13

## 2025-02-21 RX ADMIN — OXYCODONE HYDROCHLORIDE 5 MG: 5 TABLET ORAL at 16:45

## 2025-02-21 RX ADMIN — PROPOFOL 200 MG: 10 INJECTION, EMULSION INTRAVENOUS at 13:36

## 2025-02-21 RX ADMIN — MIDAZOLAM 2 MG: 1 INJECTION INTRAMUSCULAR; INTRAVENOUS at 13:32

## 2025-02-21 RX ADMIN — DIPHENHYDRAMINE HYDROCHLORIDE 12.5 MG: 50 INJECTION INTRAMUSCULAR; INTRAVENOUS at 13:50

## 2025-02-21 RX ADMIN — METOCLOPRAMIDE 10 MG: 5 INJECTION, SOLUTION INTRAMUSCULAR; INTRAVENOUS at 17:10

## 2025-02-21 RX ADMIN — KETOROLAC TROMETHAMINE 30 MG: 30 INJECTION, SOLUTION INTRAMUSCULAR at 15:22

## 2025-02-21 RX ADMIN — FENTANYL CITRATE 100 MCG: 50 INJECTION, SOLUTION INTRAMUSCULAR; INTRAVENOUS at 13:36

## 2025-02-21 RX ADMIN — LIDOCAINE HYDROCHLORIDE 50 MG: 10 INJECTION, SOLUTION EPIDURAL; INFILTRATION; INTRACAUDAL; PERINEURAL at 13:36

## 2025-02-21 RX ADMIN — ROCURONIUM BROMIDE 10 MG: 50 INJECTION INTRAVENOUS at 14:20

## 2025-02-21 RX ADMIN — ROCURONIUM BROMIDE 10 MG: 50 INJECTION INTRAVENOUS at 14:53

## 2025-02-21 RX ADMIN — IPRATROPIUM BROMIDE AND ALBUTEROL SULFATE 3 ML: 2.5; .5 SOLUTION RESPIRATORY (INHALATION) at 11:10

## 2025-02-21 RX ADMIN — ROCURONIUM BROMIDE 20 MG: 50 INJECTION INTRAVENOUS at 13:54

## 2025-02-21 RX ADMIN — SUGAMMADEX 100 MG: 100 INJECTION, SOLUTION INTRAVENOUS at 15:39

## 2025-02-21 RX ADMIN — MAGNESIUM SULFATE HEPTAHYDRATE 1000 MG: 1 INJECTION, SOLUTION INTRAVENOUS at 13:59

## 2025-02-21 RX ADMIN — HYDROMORPHONE HYDROCHLORIDE 0.5 MG: 1 INJECTION, SOLUTION INTRAMUSCULAR; INTRAVENOUS; SUBCUTANEOUS at 14:53

## 2025-02-21 RX ADMIN — DEXAMETHASONE SODIUM PHOSPHATE 8 MG: 4 INJECTION INTRA-ARTICULAR; INTRALESIONAL; INTRAMUSCULAR; INTRAVENOUS; SOFT TISSUE at 13:41

## 2025-02-21 RX ADMIN — ONDANSETRON 4 MG: 2 INJECTION, SOLUTION INTRAMUSCULAR; INTRAVENOUS at 16:12

## 2025-02-21 RX ADMIN — SUGAMMADEX 100 MG: 100 INJECTION, SOLUTION INTRAVENOUS at 15:37

## 2025-02-21 RX ADMIN — Medication 1000 MG: at 13:48

## 2025-02-21 RX ADMIN — HYDROMORPHONE HYDROCHLORIDE 0.5 MG: 1 INJECTION, SOLUTION INTRAMUSCULAR; INTRAVENOUS; SUBCUTANEOUS at 16:04

## 2025-02-21 RX ADMIN — MAGNESIUM SULFATE HEPTAHYDRATE 1000 MG: 1 INJECTION, SOLUTION INTRAVENOUS at 14:11

## 2025-02-21 RX ADMIN — FENTANYL CITRATE 25 MCG: 50 INJECTION, SOLUTION INTRAMUSCULAR; INTRAVENOUS at 15:30

## 2025-02-21 RX ADMIN — FENTANYL CITRATE 25 MCG: 50 INJECTION, SOLUTION INTRAMUSCULAR; INTRAVENOUS at 15:44

## 2025-02-21 RX ADMIN — FENTANYL CITRATE 25 MCG: 50 INJECTION, SOLUTION INTRAMUSCULAR; INTRAVENOUS at 15:32

## 2025-02-21 RX ADMIN — DEXMEDETOMIDINE HCL 4 MCG: 100 INJECTION INTRAVENOUS at 15:44

## 2025-02-21 RX ADMIN — HYDROMORPHONE HYDROCHLORIDE 0.5 MG: 1 INJECTION, SOLUTION INTRAMUSCULAR; INTRAVENOUS; SUBCUTANEOUS at 13:58

## 2025-02-21 RX ADMIN — ROCURONIUM BROMIDE 50 MG: 50 INJECTION INTRAVENOUS at 13:36

## 2025-02-21 ASSESSMENT — PAIN DESCRIPTION - ORIENTATION
ORIENTATION: LOWER

## 2025-02-21 ASSESSMENT — PAIN SCALES - GENERAL
PAINLEVEL_OUTOF10: 8
PAINLEVEL_OUTOF10: 8
PAINLEVEL_OUTOF10: 7
PAINLEVEL_OUTOF10: 6
PAINLEVEL_OUTOF10: 7

## 2025-02-21 ASSESSMENT — PAIN DESCRIPTION - DESCRIPTORS
DESCRIPTORS: THROBBING
DESCRIPTORS: BURNING;TINGLING;NUMBNESS

## 2025-02-21 ASSESSMENT — PAIN DESCRIPTION - LOCATION
LOCATION: BACK

## 2025-02-21 ASSESSMENT — PAIN - FUNCTIONAL ASSESSMENT: PAIN_FUNCTIONAL_ASSESSMENT: 0-10

## 2025-02-21 ASSESSMENT — LIFESTYLE VARIABLES: SMOKING_STATUS: 1

## 2025-02-21 NOTE — H&P
BRUCE Reynoso - CNP   MAGNESIUM PO Take 1,000 mg by mouth nightly   Yes Provider, MD Elias   acetaminophen (TYLENOL) 500 MG tablet Take 2 tablets by mouth every 6 hours as needed for Pain 3/6/23   Krupa Cooper DO       Allergies:    Ibuprofen    Social History:   Social History     Socioeconomic History    Marital status:      Spouse name: None    Number of children: None    Years of education: None    Highest education level: None   Tobacco Use    Smoking status: Every Day     Current packs/day: 0.00     Average packs/day: 0.3 packs/day for 15.4 years (3.9 ttl pk-yrs)     Types: Cigarettes     Start date: 2008     Last attempt to quit:      Years since quittin.1    Smokeless tobacco: Never    Tobacco comments:     I have been reducing cigarettes over the past two months. I am currently down to 5 a day.   Vaping Use    Vaping status: Never Used   Substance and Sexual Activity    Alcohol use: Never    Drug use: No    Sexual activity: Yes     Partners: Male     Comment:      Social Determinants of Health     Food Insecurity: No Food Insecurity (2024)    Received from [facility]    Hunger Screening     Within the past 12 months we worried whether our food would run out before we got money to buy more.: Never True     Within the past 12 months the food we bought just didn't last and we didn't have money to get more.: Never True   Physical Activity: Insufficiently Active (2025)    Exercise Vital Sign     Days of Exercise per Week: 2 days     Minutes of Exercise per Session: 30 min   Intimate Partner Violence: Unknown (2024)    Received from [facility]    Humiliation, Afraid, Rape, and Kick questionnaire     Fear of Current or Ex-Partner: No       Family History:  Family History   Problem Relation Age of Onset    High Blood Pressure Mother     Diabetes Mother         Type 2    Asthma Mother     Kidney Disease Father         KIDNEY STONES     Heart Failure Maternal Grandmother     Breast Cancer Paternal Grandmother     Other Paternal Grandmother         endometriosis    Asthma Brother     Heart Surgery Paternal Grandfather     Stroke Paternal Grandfather          REVIEW OF SYSTEMS:  No changes from baseline regarding Fever, Chills, Chest pain, SOB, Respiratory, Cardiovascular, GI, , Dermatologic, Endocrine.     PHYSICAL EXAM:  Temperature 97.9 °F (36.6 °C), temperature source Temporal, height 1.575 m (5' 2\"), weight 64 kg (141 lb), last menstrual period 02/10/2025, not currently breastfeeding.  Gen: alert and oriented  Chest: symmetric chest excursion, non labored breathing  Heart: RRR   LLE: Skin intact. No open wounds or lesions. Radicular pain in the L4 distribution. 4/5 Dorsiflexion and EHL compared to contralateral side. Compartments soft. 2+ DP pulse. TA/EHL/FHL/GS motor intact. Deep and Superficial Peroneal/Saphenous/Sural SILT.       LABS:  No results for input(s): \"WBC\", \"HGB\", \"HCT\", \"PLT\", \"INR\", \"NA\", \"K\", \"BUN\", \"CREATININE\", \"GLUCOSE\" in the last 72 hours.    Invalid input(s): \"PT\", \"PTT\"       A/P: 34 y.o. female a left paracentral disc extrusion at L4-5, small midline protrusion at L3-4, L5-1    - OR for open laminectomy, microdiscectomy   - NPO since midnight  - site marked  - abx on hold for OR  - consent in chart    Nishant Boo DO   10:40 AM 2/21/2025

## 2025-02-21 NOTE — DISCHARGE INSTRUCTIONS
Orthopedic Spine Discharge Instructions:  -Mobilize as tolerated. Avoid excessive Bending, Lifting, and Twisting (BLT).  -Maintain surgical dressing in place until follow-up if possible. If dressing comes off with activity and hygiene, may leave uncovered if no drainage. Otherwise may replace with simple gauze and tape dressing.  -Ice (20 minutes on and off 1 hour) as needed for swelling/pain.  -Drink plenty of fluids.  -Call the office or come to Emergency Room if signs of infection appear (hot, swollen, red, draining pus, fever).  -Take medications as prescribed.  -Wean off narcotics (Percocet/Norco) as soon as possible. Do not take Tylenol if still taking narcotics.  -No alcoholic beverages or driving/operating machinery while on narcotics  -Follow up with Dr. Retana in his office in 10-14 days after surgery/discharge. Call [phone] to schedule.    Activity  You have had anesthesia today  Do not drive, operate heavy equipment, consume alcoholic beverages, or make any important decisions  for 24 hours   If you are taking pain medication: Do not drive or consume alcohol.  Take your time changing positions today. You may feel light headed or dizzy if you move too quickly.   Continue your home medications as ordered by your physician.  Diet   You can eat your normal diet when you feel well. You should start off with bland foods like chicken soup, toast, or yogurt. Then advance as tolerated.  Drink plenty of fluids (unless your doctor tells you not to). Your urine should be very lightly colored without a strong odor.

## 2025-02-21 NOTE — ANESTHESIA POSTPROCEDURE EVALUATION
Department of Anesthesiology  Postprocedure Note    Patient: Rahel Pittman  MRN: [id number]  YOB: 1990  Date of evaluation: 2/21/2025    Procedure Summary       Date: 02/21/25 Room / Location: [address]    Anesthesia Start: 1332 Anesthesia Stop: 1555    Procedure: L4-5 POSTERIOR LUMBAR LAMINECTOMY MICRODISCECTOMY (Spine Lumbar) Diagnosis:       Lumbar herniated disc      (Lumbar herniated disc [M51.26])    Surgeons: Rashaun Retana DO Responsible Provider: Ankit Bradshaw MD    Anesthesia Type: general ASA Status: 2            Anesthesia Type: No value filed.    Benny Phase I: Benny Score: 9    Benny Phase II:      Anesthesia Post Evaluation    Patient location during evaluation: PACU  Patient participation: complete - patient participated  Level of consciousness: awake and alert  Airway patency: patent  Nausea & Vomiting: no nausea and no vomiting  Cardiovascular status: hemodynamically stable  Respiratory status: room air and spontaneous ventilation  Hydration status: euvolemic  Multimodal analgesia pain management approach  Pain management: adequate    No notable events documented.

## 2025-02-21 NOTE — BRIEF OP NOTE
Brief Postoperative Note      Patient: Rahel Pittman  YOB: 1990  MRN: [id number]    Date of Procedure: 2/21/2025    Pre-Op Diagnosis Codes:      * Lumbar herniated disc [M51.26]    Post-Op Diagnosis:   - L4-5 paracentral disc herniation, left        Procedure(s):  - L4-5 left hemilaminectomy, microdiscectomy     Surgeon(s):  Rashaun Retana DO    Assistant:  Nishant Boo, PGY-5    Anesthesia: General    Estimated Blood Loss (mL): 50    Complications: None    Specimens:   * No specimens in log *    Implants:  * No implants in log *      Drains: * No LDAs found *    Findings:  Infection Present At Time Of Surgery (PATOS) (choose all levels that have infection present):  No infection present  Other Findings: see op note    Electronically signed by Nishant Boo DO on 2/21/2025 at 3:50 PM

## 2025-02-21 NOTE — ANESTHESIA PRE PROCEDURE
Department of Anesthesiology  Preprocedure Note       Name:  Rahel Pittman   Age:  34 y.o.  :  1990                                          MRN:  [id number]         Date:  2025      Surgeon: Surgeon(s):  Rashaun Retana DO    Procedure: Procedure(s):  L4-5 POSTERIOR LUMBAR LAMINECTOMY MICRODISCECTOMY    Medications prior to admission:   Prior to Admission medications    Medication Sig Start Date End Date Taking? Authorizing Provider   gabapentin (NEURONTIN) 300 MG capsule Take 1 capsule by mouth nightly. 25  Yes ProviderElias MD   HYDROcodone-acetaminophen (NORCO) 5-325 MG per tablet TAKE 1 TABLET BY MOUTH AT BEDTIME AS NEEDED FOR PAIN 25  Yes ProviderElias MD   celecoxib (CELEBREX) 200 MG capsule Take 1 capsule by mouth 2 times daily 25  Yes Palmira Gautam APRN - CNP   MAGNESIUM PO Take 1,000 mg by mouth nightly   Yes ProviderElias MD   acetaminophen (TYLENOL) 500 MG tablet Take 2 tablets by mouth every 6 hours as needed for Pain 3/6/23   Krupa Cooper DO       Current medications:    Current Facility-Administered Medications   Medication Dose Route Frequency Provider Last Rate Last Admin   • ceFAZolin (ANCEF) 2000 mg in sterile water 20 mL IV syringe  2,000 mg IntraVENous On Call to OR Rashaun Retana DO       • 0.9 % sodium chloride infusion   IntraVENous Continuous Mahogany Herrera MD       • lactated ringers infusion   IntraVENous Continuous Mahogany Herrera MD       • sodium chloride flush 0.9 % injection 5-40 mL  5-40 mL IntraVENous 2 times per day Mahogany Herrera MD       • sodium chloride flush 0.9 % injection 5-40 mL  5-40 mL IntraVENous PRN Mahogany Herrera MD       • 0.9 % sodium chloride infusion   IntraVENous PRN Mahogany Herrera MD           Allergies:    Allergies   Allergen Reactions   • Ibuprofen Other (See Comments)     GI PAIN AND BLOATING         Problem List:    Patient Active Problem List   Diagnosis Code   • Anxiety F41.9   • Acne vulgaris L70.0   •

## 2025-02-24 NOTE — OP NOTE
Operative Note      Patient: Rahel Pittman  YOB: 1990  MRN: [id number]    Date of Procedure: 2/21/2025    Pre-Op Diagnosis Codes:      * Lumbar herniated disc [M51.26] L4-5    Post-Op Diagnosis: Same       Procedure(s):  L4-5 MICRODISCECTOMY, LEFT [01258]    Surgeon(s):  Rashaun Retana DO    Assistant:   Nishant Boo DO PGY-5    Anesthesia: General    Estimated Blood Loss (mL): 50    Complications: None    Specimens:   * No specimens in log *    Implants:  * No implants in log *      Drains: * No LDAs found *    Findings:  Infection Present At Time Of Surgery (PATOS) (choose all levels that have infection present):  No infection present  Other Findings: left L4-5 paracentral disc extrusion    Detailed Description of Procedure:    INDICATIONS:     Patient is a pleasant 34-year-old  female who presented with lower back and left buttock and leg pain. MRI demonstrated left L4-5 paracentral disc extrusion and patient had incomplete and only temporary relief of symptoms with multiple conservative interventions including physical therapy, activity modification, and by mouth medications. We discussed referral for injections with pain management and surgical intervention with L4-5 microdiscectomy. Benefits and risks of surgery were discussed at great length including, but not limited to bleeding, infection, damage to nerves and blood vessels, wound complications, residual pain and stiffness, need for additional surgery, re-herniation, blood clots, spinal fluid leak, risks of anesthesia, and death. Given severe pain and symptoms limiting her ability to mobilize well and have good quality of life, patient elected to proceed with operative intervention.     OPERATIVE PROCEDURE:      Patient was met in the preoperative holding area at which time a history and physical was performed, informed consent was obtained, and operative site was marked. Patient was then transported from the pre-operative

## 2025-03-05 ENCOUNTER — OFFICE VISIT (OUTPATIENT)
Dept: OBGYN CLINIC | Age: 35
End: 2025-03-05
Payer: COMMERCIAL

## 2025-03-05 VITALS
BODY MASS INDEX: 27.23 KG/M2 | HEART RATE: 82 BPM | WEIGHT: 148 LBS | HEIGHT: 62 IN | DIASTOLIC BLOOD PRESSURE: 74 MMHG | SYSTOLIC BLOOD PRESSURE: 114 MMHG | RESPIRATION RATE: 18 BRPM

## 2025-03-05 DIAGNOSIS — Z01.419 WELL WOMAN EXAM WITH ROUTINE GYNECOLOGICAL EXAM: Primary | ICD-10-CM

## 2025-03-05 DIAGNOSIS — L73.9 FOLLICULITIS: ICD-10-CM

## 2025-03-05 PROCEDURE — 99395 PREV VISIT EST AGE 18-39: CPT | Performed by: NURSE PRACTITIONER

## 2025-03-05 NOTE — PROGRESS NOTES
Needs: Not on file   Physical Activity: Insufficiently Active (1/4/2025)    Exercise Vital Sign     Days of Exercise per Week: 2 days     Minutes of Exercise per Session: 30 min   Stress: Not on file   Social Connections: Not on file   Intimate Partner Violence: Unknown (11/27/2024)    Received from [facility]    Humiliation, Afraid, Rape, and Kick questionnaire     Fear of Current or Ex-Partner: No     Emotionally Abused: Not on file     Physically Abused: Not on file     Sexually Abused: Not on file   Housing Stability: Not on file       MEDICATIONS:  Current Outpatient Medications   Medication Sig Dispense Refill    cyclobenzaprine (FLEXERIL) 10 MG tablet Take 0.5 tablets by mouth 3 times daily as needed for Muscle spasms 21 tablet 0    gabapentin (NEURONTIN) 300 MG capsule Take 1 capsule by mouth nightly.      celecoxib (CELEBREX) 200 MG capsule Take 1 capsule by mouth 2 times daily 30 capsule 0    MAGNESIUM PO Take 1,000 mg by mouth nightly       No current facility-administered medications for this visit.           ALLERGIES:  Allergies as of 03/05/2025 - Fully Reviewed 03/05/2025   Allergen Reaction Noted    Ibuprofen Other (See Comments) 11/08/2016       Symptoms of decreased mood absent  Symptoms of anhedonia absent    **If either question is answered in a  positive fashion then complete the PHQ9 Scoring Evaluation and make the appropriate referral**      Immunization status: up to date and documented.      Gynecologic History:  Menarche: 14 yo  Menopause at [age] yo     Patient's last menstrual period was 02/10/2025.    Sexually Active: Yes    STD History: No     Permanent Sterilization: No   Reversible Birth Control: No        Hormone Replacement Exposure: No      Genetic Qualified Family History of Breast, Ovarian , Colon or Uterine Cancer: Yes PGM breast age 38     If YES see scanned worksheet.    Preventative Health Testing:    Health Maintenance:  Health Maintenance Due   Topic Date Due

## 2025-07-23 ENCOUNTER — HOSPITAL ENCOUNTER (EMERGENCY)
Age: 35
Discharge: HOME | End: 2025-07-23
Payer: COMMERCIAL

## 2025-07-23 ENCOUNTER — APPOINTMENT (OUTPATIENT)
Dept: GENERAL RADIOLOGY | Age: 35
End: 2025-07-23
Payer: COMMERCIAL

## 2025-07-23 VITALS
HEIGHT: 62 IN | WEIGHT: 150 LBS | OXYGEN SATURATION: 98 % | SYSTOLIC BLOOD PRESSURE: 115 MMHG | RESPIRATION RATE: 16 BRPM | DIASTOLIC BLOOD PRESSURE: 79 MMHG | BODY MASS INDEX: 27.6 KG/M2 | HEART RATE: 88 BPM | TEMPERATURE: 98.2 F

## 2025-07-23 DIAGNOSIS — G89.29 ACUTE EXACERBATION OF CHRONIC LOW BACK PAIN: Primary | ICD-10-CM

## 2025-07-23 DIAGNOSIS — M54.50 ACUTE EXACERBATION OF CHRONIC LOW BACK PAIN: Primary | ICD-10-CM

## 2025-07-23 PROCEDURE — 6370000000 HC RX 637 (ALT 250 FOR IP): Performed by: PHYSICIAN ASSISTANT

## 2025-07-23 PROCEDURE — 72100 X-RAY EXAM L-S SPINE 2/3 VWS: CPT

## 2025-07-23 PROCEDURE — 6360000002 HC RX W HCPCS: Performed by: PHYSICIAN ASSISTANT

## 2025-07-23 PROCEDURE — 96372 THER/PROPH/DIAG INJ SC/IM: CPT

## 2025-07-23 PROCEDURE — 99284 EMERGENCY DEPT VISIT MOD MDM: CPT

## 2025-07-23 RX ORDER — HYDROMORPHONE HYDROCHLORIDE 1 MG/ML
1 INJECTION, SOLUTION INTRAMUSCULAR; INTRAVENOUS; SUBCUTANEOUS ONCE
Status: COMPLETED | OUTPATIENT
Start: 2025-07-23 | End: 2025-07-23

## 2025-07-23 RX ORDER — METAXALONE 800 MG/1
800 TABLET ORAL ONCE
Status: COMPLETED | OUTPATIENT
Start: 2025-07-23 | End: 2025-07-23

## 2025-07-23 RX ORDER — ONDANSETRON 4 MG/1
4 TABLET, ORALLY DISINTEGRATING ORAL ONCE
Status: COMPLETED | OUTPATIENT
Start: 2025-07-23 | End: 2025-07-23

## 2025-07-23 RX ORDER — DEXAMETHASONE SODIUM PHOSPHATE 10 MG/ML
10 INJECTION, SOLUTION INTRAMUSCULAR; INTRAVENOUS ONCE
Status: COMPLETED | OUTPATIENT
Start: 2025-07-23 | End: 2025-07-23

## 2025-07-23 RX ORDER — OXYCODONE AND ACETAMINOPHEN 5; 325 MG/1; MG/1
1-2 TABLET ORAL EVERY 6 HOURS PRN
Qty: 15 TABLET | Refills: 0 | Status: SHIPPED | OUTPATIENT
Start: 2025-07-23 | End: 2025-07-28

## 2025-07-23 RX ORDER — PREDNISONE 10 MG/1
TABLET ORAL
Qty: 30 TABLET | Refills: 0 | Status: SHIPPED | OUTPATIENT
Start: 2025-07-23

## 2025-07-23 RX ADMIN — HYDROMORPHONE HYDROCHLORIDE 1 MG: 1 INJECTION, SOLUTION INTRAMUSCULAR; INTRAVENOUS; SUBCUTANEOUS at 15:52

## 2025-07-23 RX ADMIN — ONDANSETRON 4 MG: 4 TABLET, ORALLY DISINTEGRATING ORAL at 15:49

## 2025-07-23 RX ADMIN — DEXAMETHASONE SODIUM PHOSPHATE 10 MG: 10 INJECTION INTRAMUSCULAR; INTRAVENOUS at 15:50

## 2025-07-23 RX ADMIN — METAXALONE 800 MG: 800 TABLET ORAL at 15:51

## 2025-07-23 ASSESSMENT — PAIN SCALES - GENERAL
PAINLEVEL_OUTOF10: 5
PAINLEVEL_OUTOF10: 10
PAINLEVEL_OUTOF10: 5

## 2025-07-23 ASSESSMENT — PAIN DESCRIPTION - FREQUENCY: FREQUENCY: CONTINUOUS

## 2025-07-23 ASSESSMENT — PAIN DESCRIPTION - LOCATION
LOCATION: BACK
LOCATION: BACK
LOCATION_2: LEG
LOCATION: BACK

## 2025-07-23 ASSESSMENT — PAIN DESCRIPTION - ORIENTATION
ORIENTATION: MID;LOWER
ORIENTATION: LOWER
ORIENTATION_2: LEFT

## 2025-07-23 ASSESSMENT — PAIN DESCRIPTION - DESCRIPTORS
DESCRIPTORS_2: SHOOTING
DESCRIPTORS: ACHING;SHARP;SHOOTING
DESCRIPTORS: ACHING

## 2025-07-23 ASSESSMENT — PAIN DESCRIPTION - PAIN TYPE: TYPE: CHRONIC PAIN

## 2025-07-23 ASSESSMENT — PAIN - FUNCTIONAL ASSESSMENT
PAIN_FUNCTIONAL_ASSESSMENT: 0-10
PAIN_FUNCTIONAL_ASSESSMENT: 0-10

## 2025-07-23 ASSESSMENT — PAIN DESCRIPTION - INTENSITY: RATING_2: 5

## 2025-07-23 NOTE — DISCHARGE INSTRUCTIONS
Take meds as prescribed.  Follow up with doctor  in 3 -4 days.  Return to ER immediately if symptoms worsen or persist.    Do not drive, operate machinery, climb or engage in any dangerous activity while taking narcotics or percocet

## 2025-07-23 NOTE — ED NOTES
Pt presents to ED for mid lower back/tailbone pain radiating down LLE.  States she sneezed earlier today and pain came on suddenly.  Pt reports hx of back problems/herniated disc and states she had a laminectomy/microdiscectomy in February of this year.  States pain is constant but worse with movement or when she puts pressure on area.

## 2025-07-23 NOTE — ED PROVIDER NOTES
Cardiovascular:      Rate and Rhythm: Normal rate and regular rhythm.   Pulmonary:      Effort: Pulmonary effort is normal.      Breath sounds: Normal breath sounds.   Abdominal:      Palpations: Abdomen is soft.   Musculoskeletal:         General: Normal range of motion.      Cervical back: Normal range of motion and neck supple.        Back:       Comments: Pain worse with raising left leg.  Pain intensifies short on the left leg when raising it.   Skin:     General: Skin is warm.      Findings: No rash.   Neurological:      Mental Status: She is alert and oriented to person, place, and time.   Psychiatric:         Behavior: Behavior normal.                 DIAGNOSTIC RESULTS     EKG: All EKG's are interpreted by the Emergency Department Physician who either signs or Co-signs this chart in the absence of a cardiologist.        RADIOLOGY:   Non-plain film images such as CT, Ultrasound and MRI are read by the radiologist. Plain radiographic images arevisualized and preliminarily interpreted by the emergency physician with the below findings:        Interpretation per the Radiologist below, if available at thetime of this note:          ED BEDSIDE ULTRASOUND:   Performed by ED Physician - none    LABS:  Labs Reviewed - No data to display    All other labs were within normal range or not returned as of this dictation.    EMERGENCY DEPARTMENT COURSE and DIFFERENTIAL DIAGNOSIS/MDM:   Vitals:    Vitals:    07/23/25 1453   BP: (!) 121/94   Pulse: (!) 107   Resp: 18   Temp: 98.2 °F (36.8 °C)   TempSrc: Oral   SpO2: 98%   Weight: 68 kg (150 lb)   Height: 1.575 m (5' 2\")     HEARTSCORE:0    Pleasant 35-year-old female history of chronic back pain and bulging disc.  Pain worsened today after sneezing.  X-rays will be obtained.  Will treat and medicate for pain here in the ER.  Patient scheduled see surgeon tomorrow.  Starting injections on Tuesday.  Will medicate x-ray and reevaluate.    4:52 PM EDT  Pain much improved.

## 2025-08-09 ENCOUNTER — APPOINTMENT (OUTPATIENT)
Dept: CT IMAGING | Facility: CLINIC | Age: 35
End: 2025-08-09
Payer: COMMERCIAL

## 2025-08-09 ENCOUNTER — HOSPITAL ENCOUNTER (EMERGENCY)
Facility: CLINIC | Age: 35
Discharge: HOME | End: 2025-08-09
Attending: STUDENT IN AN ORGANIZED HEALTH CARE EDUCATION/TRAINING PROGRAM
Payer: COMMERCIAL

## 2025-08-09 VITALS
BODY MASS INDEX: 26.68 KG/M2 | DIASTOLIC BLOOD PRESSURE: 75 MMHG | OXYGEN SATURATION: 97 % | HEIGHT: 62 IN | TEMPERATURE: 98.2 F | WEIGHT: 145 LBS | RESPIRATION RATE: 16 BRPM | HEART RATE: 109 BPM | SYSTOLIC BLOOD PRESSURE: 141 MMHG

## 2025-08-09 DIAGNOSIS — N83.201 RIGHT OVARIAN CYST: Primary | ICD-10-CM

## 2025-08-09 LAB
ALBUMIN SERPL-MCNC: 4 G/DL (ref 3.5–5.2)
ALBUMIN/GLOB SERPL: 1.7 {RATIO}
ALP SERPL-CCNC: 74 U/L (ref 35–104)
ALT SERPL-CCNC: 22 U/L (ref 10–35)
ANION GAP SERPL CALCULATED.3IONS-SCNC: 11 MMOL/L (ref 9–16)
AST SERPL-CCNC: 18 U/L (ref 10–35)
BACTERIA URNS QL MICRO: ABNORMAL
BASOPHILS # BLD: 0 K/UL (ref 0–0.2)
BASOPHILS NFR BLD: 1 % (ref 0–2)
BILIRUB SERPL-MCNC: 0.3 MG/DL (ref 0–1.2)
BILIRUB UR QL STRIP: NEGATIVE
BUN SERPL-MCNC: 8 MG/DL (ref 6–20)
CALCIUM SERPL-MCNC: 9.2 MG/DL (ref 8.6–10.4)
CHARACTER UR: ABNORMAL
CHLORIDE SERPL-SCNC: 105 MMOL/L (ref 98–107)
CLARITY UR: CLEAR
CO2 SERPL-SCNC: 22 MMOL/L (ref 20–31)
COLOR UR: YELLOW
CREAT SERPL-MCNC: 0.8 MG/DL (ref 0.5–0.9)
EOSINOPHIL # BLD: 0.1 K/UL (ref 0–0.4)
EOSINOPHILS RELATIVE PERCENT: 1 % (ref 1–4)
EPI CELLS #/AREA URNS HPF: ABNORMAL /HPF (ref 0–5)
ERYTHROCYTE [DISTWIDTH] IN BLOOD BY AUTOMATED COUNT: 13 % (ref 12.5–15.4)
GFR, ESTIMATED: >90 ML/MIN/1.73M2
GLUCOSE SERPL-MCNC: 93 MG/DL (ref 74–99)
GLUCOSE UR STRIP-MCNC: NEGATIVE MG/DL
HCG SERPL QL: NEGATIVE
HCT VFR BLD AUTO: 40.8 % (ref 36–46)
HGB BLD-MCNC: 14.2 G/DL (ref 12–16)
HGB UR QL STRIP.AUTO: ABNORMAL
KETONES UR STRIP-MCNC: ABNORMAL MG/DL
LEUKOCYTE ESTERASE UR QL STRIP: NEGATIVE
LIPASE SERPL-CCNC: 25 U/L (ref 13–60)
LYMPHOCYTES NFR BLD: 1.3 K/UL (ref 1–4.8)
LYMPHOCYTES RELATIVE PERCENT: 12 % (ref 24–44)
MCH RBC QN AUTO: 33.6 PG (ref 26–34)
MCHC RBC AUTO-ENTMCNC: 34.8 G/DL (ref 31–37)
MCV RBC AUTO: 96.6 FL (ref 80–100)
MONOCYTES NFR BLD: 0.4 K/UL (ref 0.1–1.2)
MONOCYTES NFR BLD: 4 % (ref 2–11)
NEUTROPHILS NFR BLD: 82 % (ref 36–66)
NEUTS SEG NFR BLD: 8.5 K/UL (ref 1.8–7.7)
NITRITE UR QL STRIP: NEGATIVE
PH UR STRIP: 7 [PH] (ref 5–8)
PLATELET # BLD AUTO: 223 K/UL (ref 140–450)
PMV BLD AUTO: 7.5 FL (ref 6–12)
POTASSIUM SERPL-SCNC: 4 MMOL/L (ref 3.7–5.3)
PROT SERPL-MCNC: 6.3 G/DL (ref 6.6–8.7)
PROT UR STRIP-MCNC: NEGATIVE MG/DL
RBC # BLD AUTO: 4.22 M/UL (ref 4–5.2)
RBC #/AREA URNS HPF: ABNORMAL /HPF (ref 0–2)
SODIUM SERPL-SCNC: 138 MMOL/L (ref 136–145)
SP GR UR STRIP: 1.02 (ref 1–1.03)
UROBILINOGEN UR STRIP-ACNC: NORMAL EU/DL (ref 0–1)
WBC #/AREA URNS HPF: ABNORMAL /HPF (ref 0–5)
WBC OTHER # BLD: 10.4 K/UL (ref 3.5–11)

## 2025-08-09 PROCEDURE — 83690 ASSAY OF LIPASE: CPT

## 2025-08-09 PROCEDURE — 80053 COMPREHEN METABOLIC PANEL: CPT

## 2025-08-09 PROCEDURE — 6360000002 HC RX W HCPCS: Performed by: STUDENT IN AN ORGANIZED HEALTH CARE EDUCATION/TRAINING PROGRAM

## 2025-08-09 PROCEDURE — 74177 CT ABD & PELVIS W/CONTRAST: CPT

## 2025-08-09 PROCEDURE — 81001 URINALYSIS AUTO W/SCOPE: CPT

## 2025-08-09 PROCEDURE — 96361 HYDRATE IV INFUSION ADD-ON: CPT

## 2025-08-09 PROCEDURE — 85025 COMPLETE CBC W/AUTO DIFF WBC: CPT

## 2025-08-09 PROCEDURE — 84703 CHORIONIC GONADOTROPIN ASSAY: CPT

## 2025-08-09 PROCEDURE — 2500000003 HC RX 250 WO HCPCS: Performed by: STUDENT IN AN ORGANIZED HEALTH CARE EDUCATION/TRAINING PROGRAM

## 2025-08-09 PROCEDURE — 99285 EMERGENCY DEPT VISIT HI MDM: CPT

## 2025-08-09 PROCEDURE — 96374 THER/PROPH/DIAG INJ IV PUSH: CPT

## 2025-08-09 PROCEDURE — 2580000003 HC RX 258: Performed by: STUDENT IN AN ORGANIZED HEALTH CARE EDUCATION/TRAINING PROGRAM

## 2025-08-09 PROCEDURE — 6360000004 HC RX CONTRAST MEDICATION: Performed by: STUDENT IN AN ORGANIZED HEALTH CARE EDUCATION/TRAINING PROGRAM

## 2025-08-09 RX ORDER — BENZONATATE 100 MG/1
100 CAPSULE ORAL 3 TIMES DAILY PRN
COMMUNITY

## 2025-08-09 RX ORDER — KETOROLAC TROMETHAMINE 30 MG/ML
30 INJECTION, SOLUTION INTRAMUSCULAR; INTRAVENOUS ONCE
Status: COMPLETED | OUTPATIENT
Start: 2025-08-09 | End: 2025-08-09

## 2025-08-09 RX ORDER — 0.9 % SODIUM CHLORIDE 0.9 %
1000 INTRAVENOUS SOLUTION INTRAVENOUS ONCE
Status: COMPLETED | OUTPATIENT
Start: 2025-08-09 | End: 2025-08-09

## 2025-08-09 RX ORDER — CYCLOBENZAPRINE HCL 5 MG
5 TABLET ORAL 3 TIMES DAILY PRN
COMMUNITY

## 2025-08-09 RX ORDER — HYDROCODONE BITARTRATE AND ACETAMINOPHEN 5; 325 MG/1; MG/1
1 TABLET ORAL EVERY 6 HOURS PRN
COMMUNITY

## 2025-08-09 RX ORDER — SODIUM CHLORIDE 0.9 % (FLUSH) 0.9 %
10 SYRINGE (ML) INJECTION PRN
Status: DISCONTINUED | OUTPATIENT
Start: 2025-08-09 | End: 2025-08-09 | Stop reason: HOSPADM

## 2025-08-09 RX ORDER — 0.9 % SODIUM CHLORIDE 0.9 %
80 INTRAVENOUS SOLUTION INTRAVENOUS ONCE
Status: COMPLETED | OUTPATIENT
Start: 2025-08-09 | End: 2025-08-09

## 2025-08-09 RX ORDER — IOPAMIDOL 755 MG/ML
75 INJECTION, SOLUTION INTRAVASCULAR
Status: COMPLETED | OUTPATIENT
Start: 2025-08-09 | End: 2025-08-09

## 2025-08-09 RX ADMIN — KETOROLAC TROMETHAMINE 30 MG: 30 INJECTION, SOLUTION INTRAMUSCULAR at 14:55

## 2025-08-09 RX ADMIN — SODIUM CHLORIDE 80 ML: 9 INJECTION, SOLUTION INTRAVENOUS at 15:55

## 2025-08-09 RX ADMIN — SODIUM CHLORIDE 1000 ML: 0.9 INJECTION, SOLUTION INTRAVENOUS at 14:55

## 2025-08-09 RX ADMIN — IOPAMIDOL 75 ML: 755 INJECTION, SOLUTION INTRAVENOUS at 15:56

## 2025-08-09 RX ADMIN — SODIUM CHLORIDE, PRESERVATIVE FREE 10 ML: 5 INJECTION INTRAVENOUS at 15:56

## 2025-08-09 ASSESSMENT — PAIN DESCRIPTION - LOCATION
LOCATION: ABDOMEN
LOCATION: ABDOMEN

## 2025-08-09 ASSESSMENT — PAIN - FUNCTIONAL ASSESSMENT
PAIN_FUNCTIONAL_ASSESSMENT: 0-10
PAIN_FUNCTIONAL_ASSESSMENT: 0-10

## 2025-08-09 ASSESSMENT — ENCOUNTER SYMPTOMS
COUGH: 1
VOMITING: 0
BACK PAIN: 0
ABDOMINAL PAIN: 1
NAUSEA: 0
SHORTNESS OF BREATH: 0

## 2025-08-09 ASSESSMENT — PAIN SCALES - GENERAL
PAINLEVEL_OUTOF10: 4
PAINLEVEL_OUTOF10: 2
PAINLEVEL_OUTOF10: 4

## 2025-08-09 ASSESSMENT — PAIN DESCRIPTION - DESCRIPTORS: DESCRIPTORS: BURNING

## 2025-08-09 ASSESSMENT — PAIN DESCRIPTION - PAIN TYPE: TYPE: ACUTE PAIN

## 2025-08-09 ASSESSMENT — PAIN DESCRIPTION - FREQUENCY: FREQUENCY: CONTINUOUS

## 2025-08-09 ASSESSMENT — PAIN DESCRIPTION - ORIENTATION: ORIENTATION: RIGHT

## (undated) DEVICE — INTENDED FOR TISSUE SEPARATION, AND OTHER PROCEDURES THAT REQUIRE A SHARP SURGICAL BLADE TO PUNCTURE OR CUT.: Brand: BARD-PARKER ® CARBON RIB-BACK BLADES

## (undated) DEVICE — BLANKET WRM W29.9XL79.1IN UP BODY FORC AIR MISTRAL-AIR

## (undated) DEVICE — GOWN,SIRUS,NONRNF,SETINSLV,XL,20/CS: Brand: MEDLINE

## (undated) DEVICE — NEEDLE SPNL L3.5IN PNK HUB S STL REG WALL FIT STYL W/ QNCKE

## (undated) DEVICE — SUTURE MCRYL + SZ 4-0 L27IN ABSRB UD L19MM PS-2 3/8 CIR MCP426H

## (undated) DEVICE — GLOVE SURG SZ 7 L12IN FNGR THK79MIL GRN LTX FREE

## (undated) DEVICE — SINGLE PORT MANIFOLD: Brand: NEPTUNE 2

## (undated) DEVICE — SYRINGE MED 50ML LUERLOCK TIP

## (undated) DEVICE — PAD,SANITARY,11 IN,MAXI,W/WINGS,N-STRL: Brand: MEDLINE

## (undated) DEVICE — PAD,NON-ADHERENT,3X8,STERILE,LF,1/PK: Brand: MEDLINE

## (undated) DEVICE — Device

## (undated) DEVICE — SUTURE VCRL + SZ 0 L27IN ABSRB VLT L26MM UR-6 5/8 CIR VCP603H

## (undated) DEVICE — DRAPE,MEDI-SLUSH,STERILE: Brand: MEDLINE

## (undated) DEVICE — SUTURE VCRL SZ 0 L36IN ABSRB UD CT-1 L36MM 1/2 CIR TAPR PNT VCP946H

## (undated) DEVICE — SYRINGE MED 10ML LUERLOCK TIP W/O SFTY DISP

## (undated) DEVICE — SEALER LAP L37CM MARYLAND JAW OPN NANO COAT MULTIFUNCTIONAL

## (undated) DEVICE — GLOVE ORANGE PI 8   MSG9080

## (undated) DEVICE — SPONGE GZ W2XL2IN NONWOVEN 4 PLY FASTER WICKING ABIL AVANT

## (undated) DEVICE — TUBING AMB

## (undated) DEVICE — CONTAINER,SPECIMEN,4OZ,OR STRL: Brand: MEDLINE

## (undated) DEVICE — SUTURE VICRYL SZ 2-0 L18IN ABSRB VLT L26MM SH 1/2 CIR J775D

## (undated) DEVICE — TROCAR: Brand: KII FIOS FIRST ENTRY

## (undated) DEVICE — BLADE ES L6IN ELASTOMERIC COAT INSUL DURABLE BEND UPTO

## (undated) DEVICE — ST CHARLES MAJOR ABDOMINAL PK: Brand: MEDLINE INDUSTRIES, INC.

## (undated) DEVICE — SOLUTION ANTIFOG VIS SYS CLEARIFY LAPSCP

## (undated) DEVICE — NEPTUNE E-SEP SMOKE EVACUATION PENCIL, COATED, 70MM BLADE, PUSH BUTTON SWITCH: Brand: NEPTUNE E-SEP

## (undated) DEVICE — TROCARS: Brand: KII® BLUNT TIP ACCESS SYSTEM

## (undated) DEVICE — CATHETER SUCT TR FL TIP 14FR W/ O CTRL

## (undated) DEVICE — 1016 S-DRAPE IRRIG POUCH 10/BOX: Brand: STERI-DRAPE™

## (undated) DEVICE — MERCY HEALTH ST CHARLES: Brand: MEDLINE INDUSTRIES, INC.

## (undated) DEVICE — STRIP,CLOSURE,WOUND,MEDI-STRIP,1/2X4: Brand: MEDLINE

## (undated) DEVICE — SOLUTION IRRIG 1000ML 0.9% SOD CHL USP POUR PLAS BTL

## (undated) DEVICE — SYRINGE MED 5ML STD CLR PLAS LUERLOCK TIP N CTRL DISP

## (undated) DEVICE — GLOVE ORANGE PI 7   MSG9070

## (undated) DEVICE — TOTAL TRAY, DB, 100% SILI FOLEY, 16FR 10: Brand: MEDLINE

## (undated) DEVICE — YANKAUER,BULB TIP,W/O VENT,RIGID,STERILE: Brand: MEDLINE

## (undated) DEVICE — CUSHION PRONEVIEW L HD NK FOAM

## (undated) DEVICE — INJECTOR UTERINE MANIPULATOR

## (undated) DEVICE — COVER,LIGHT HANDLE,FLX,2/PK: Brand: MEDLINE INDUSTRIES, INC.

## (undated) DEVICE — GOWN,SIRUS,POLYRNF,BRTHSLV,2XL,18/CS: Brand: MEDLINE

## (undated) DEVICE — TOTAL TRAY, 16FR 10ML SIL FOLEY, URN: Brand: MEDLINE

## (undated) DEVICE — GLOVE SURG SZ 85 L12IN THK75MIL DK GRN LTX FREE

## (undated) DEVICE — STRAP,POSITIONING,KNEE/BODY,FOAM,4X60": Brand: MEDLINE

## (undated) DEVICE — MARKER,SKIN,WI/RULER AND LABELS: Brand: MEDLINE

## (undated) DEVICE — DRESSING POSTOP AG PRISMASEAL 3.5X6IN

## (undated) DEVICE — DRAPE SURG W41XL74IN CLR FULL SZ C ARM 3 ADH POLY STRP E

## (undated) DEVICE — NEPTUNE E-SEP 165MM SUCTION SLEEVE: Brand: NEPTUNE E-SEP

## (undated) DEVICE — DRESSING TRNSPAR W2XL2.75IN FLM SHT SEMIPERMEABLE WIND

## (undated) DEVICE — SUTURE ENDOLOOP SZ 0 L18IN ABSRB VLT LIG SLDE KNOT VCRL

## (undated) DEVICE — SUTURE VCRL + SZ 2-0 L27IN ABSRB UD CT-2 L26MM 1/2 CIR TAPR VCP269H

## (undated) DEVICE — SUTURE VCRL + SZ 4-0 L27IN ABSRB WHT FS-2 3/8 CIR REV CUT VCP422H

## (undated) DEVICE — ST CHARLES GYN LAPAROSCOPY PK: Brand: MEDLINE INDUSTRIES, INC.

## (undated) DEVICE — SUTURE VICRYL + SZ 0 L27IN ABSRB UD L36MM CT-1 1/2 CIR VCPP41D

## (undated) DEVICE — CLEANER,CAUTERY TIP,2X2",STERILE: Brand: MEDLINE

## (undated) DEVICE — LIQUIBAND RAPID ADHESIVE 36/CS 0.8ML: Brand: MEDLINE

## (undated) DEVICE — SUTURE VCRL + SZ 2-0 L27IN ABSRB CLR CT-1 1/2 CIR TAPERCUT VCP259H